# Patient Record
Sex: FEMALE | Race: WHITE | Employment: UNEMPLOYED | URBAN - METROPOLITAN AREA
[De-identification: names, ages, dates, MRNs, and addresses within clinical notes are randomized per-mention and may not be internally consistent; named-entity substitution may affect disease eponyms.]

---

## 2021-02-23 ENCOUNTER — TELEPHONE (OUTPATIENT)
Dept: OBGYN | Age: 51
End: 2021-02-23

## 2021-03-05 ENCOUNTER — HOSPITAL ENCOUNTER (OUTPATIENT)
Age: 51
Discharge: HOME | End: 2021-03-05
Payer: MEDICARE

## 2021-03-05 ENCOUNTER — HOSPITAL ENCOUNTER (OUTPATIENT)
Dept: GENERAL RADIOLOGY | Age: 51
Discharge: HOME | End: 2021-03-07
Payer: MEDICARE

## 2021-03-05 ENCOUNTER — HOSPITAL ENCOUNTER (OUTPATIENT)
Age: 51
Discharge: HOME | End: 2021-03-07
Payer: MEDICARE

## 2021-03-05 DIAGNOSIS — F10.11 ALCOHOL ABUSE, IN REMISSION: ICD-10-CM

## 2021-03-05 LAB
ABSOLUTE EOS #: 0.24 K/UL (ref 0–0.44)
ABSOLUTE IMMATURE GRANULOCYTE: 0.03 K/UL (ref 0–0.3)
ABSOLUTE LYMPH #: 1.95 K/UL (ref 1.1–3.7)
ABSOLUTE MONO #: 0.59 K/UL (ref 0.1–1.2)
ALBUMIN SERPL-MCNC: 4.5 G/DL (ref 3.5–5.2)
ALBUMIN/GLOBULIN RATIO: 1.6 (ref 1–2.5)
ALP BLD-CCNC: 94 U/L (ref 35–104)
ALT SERPL-CCNC: 26 U/L (ref 5–33)
ANION GAP SERPL CALCULATED.3IONS-SCNC: 12 MMOL/L (ref 9–17)
AST SERPL-CCNC: 19 U/L
BASOPHILS # BLD: 1 % (ref 0–2)
BASOPHILS ABSOLUTE: 0.07 K/UL (ref 0–0.2)
BILIRUB SERPL-MCNC: 0.45 MG/DL (ref 0.3–1.2)
BUN BLDV-MCNC: 23 MG/DL (ref 6–20)
BUN/CREAT BLD: ABNORMAL (ref 9–20)
CALCIUM SERPL-MCNC: 10.1 MG/DL (ref 8.6–10.4)
CHLORIDE BLD-SCNC: 101 MMOL/L (ref 98–107)
CHOLESTEROL/HDL RATIO: 2.7
CHOLESTEROL: 152 MG/DL
CO2: 26 MMOL/L (ref 20–31)
CREAT SERPL-MCNC: 1.03 MG/DL (ref 0.5–0.9)
DIFFERENTIAL TYPE: ABNORMAL
EKG ATRIAL RATE: 57 BPM
EKG P AXIS: 68 DEGREES
EKG P-R INTERVAL: 158 MS
EKG Q-T INTERVAL: 446 MS
EKG QRS DURATION: 102 MS
EKG QTC CALCULATION (BAZETT): 434 MS
EKG R AXIS: 67 DEGREES
EKG T AXIS: 44 DEGREES
EKG VENTRICULAR RATE: 57 BPM
EOSINOPHILS RELATIVE PERCENT: 3 % (ref 1–4)
GFR AFRICAN AMERICAN: >60 ML/MIN
GFR NON-AFRICAN AMERICAN: 56 ML/MIN
GFR SERPL CREATININE-BSD FRML MDRD: ABNORMAL ML/MIN/{1.73_M2}
GFR SERPL CREATININE-BSD FRML MDRD: ABNORMAL ML/MIN/{1.73_M2}
GLUCOSE BLD-MCNC: 133 MG/DL (ref 70–99)
HAV IGM SER IA-ACNC: NONREACTIVE
HCT VFR BLD CALC: 38.7 % (ref 36.3–47.1)
HDLC SERPL-MCNC: 56 MG/DL
HEMOGLOBIN: 12.6 G/DL (ref 11.9–15.1)
HEPATITIS B CORE IGM ANTIBODY: NONREACTIVE
HEPATITIS B SURFACE ANTIGEN: NONREACTIVE
HEPATITIS C ANTIBODY: REACTIVE
HIV AG/AB: NONREACTIVE
IMMATURE GRANULOCYTES: 0 %
LDL CHOLESTEROL: 75 MG/DL (ref 0–130)
LYMPHOCYTES # BLD: 23 % (ref 24–43)
MCH RBC QN AUTO: 33.6 PG (ref 25.2–33.5)
MCHC RBC AUTO-ENTMCNC: 32.6 G/DL (ref 28.4–34.8)
MCV RBC AUTO: 103.2 FL (ref 82.6–102.9)
MONOCYTES # BLD: 7 % (ref 3–12)
NRBC AUTOMATED: 0 PER 100 WBC
PDW BLD-RTO: 12.2 % (ref 11.8–14.4)
PLATELET # BLD: 305 K/UL (ref 138–453)
PLATELET ESTIMATE: ABNORMAL
PMV BLD AUTO: 9.4 FL (ref 8.1–13.5)
POTASSIUM SERPL-SCNC: 3.7 MMOL/L (ref 3.7–5.3)
RBC # BLD: 3.75 M/UL (ref 3.95–5.11)
RBC # BLD: ABNORMAL 10*6/UL
SEG NEUTROPHILS: 66 % (ref 36–65)
SEGMENTED NEUTROPHILS ABSOLUTE COUNT: 5.7 K/UL (ref 1.5–8.1)
SODIUM BLD-SCNC: 139 MMOL/L (ref 135–144)
SOURCE: NORMAL
T. PALLIDUM, IGG: NONREACTIVE
TOTAL PROTEIN: 7.4 G/DL (ref 6.4–8.3)
TRICHOMONAS VAGINALI, MOLECULAR: NEGATIVE
TRIGL SERPL-MCNC: 106 MG/DL
TSH SERPL DL<=0.05 MIU/L-ACNC: 9.61 MIU/L (ref 0.3–5)
VITAMIN D 25-HYDROXY: 68.1 NG/ML (ref 30–100)
VLDLC SERPL CALC-MCNC: NORMAL MG/DL (ref 1–30)
WBC # BLD: 8.6 K/UL (ref 3.5–11.3)
WBC # BLD: ABNORMAL 10*3/UL

## 2021-03-05 PROCEDURE — 80053 COMPREHEN METABOLIC PANEL: CPT

## 2021-03-05 PROCEDURE — 86780 TREPONEMA PALLIDUM: CPT

## 2021-03-05 PROCEDURE — 87389 HIV-1 AG W/HIV-1&-2 AB AG IA: CPT

## 2021-03-05 PROCEDURE — 71045 X-RAY EXAM CHEST 1 VIEW: CPT

## 2021-03-05 PROCEDURE — 73130 X-RAY EXAM OF HAND: CPT

## 2021-03-05 PROCEDURE — 80061 LIPID PANEL: CPT

## 2021-03-05 PROCEDURE — 87491 CHLMYD TRACH DNA AMP PROBE: CPT

## 2021-03-05 PROCEDURE — 85025 COMPLETE CBC W/AUTO DIFF WBC: CPT

## 2021-03-05 PROCEDURE — 93005 ELECTROCARDIOGRAM TRACING: CPT

## 2021-03-05 PROCEDURE — 93010 ELECTROCARDIOGRAM REPORT: CPT | Performed by: INTERNAL MEDICINE

## 2021-03-05 PROCEDURE — 87591 N.GONORRHOEAE DNA AMP PROB: CPT

## 2021-03-05 PROCEDURE — 87661 TRICHOMONAS VAGINALIS AMPLIF: CPT

## 2021-03-05 PROCEDURE — 84443 ASSAY THYROID STIM HORMONE: CPT

## 2021-03-05 PROCEDURE — 82306 VITAMIN D 25 HYDROXY: CPT

## 2021-03-05 PROCEDURE — 80074 ACUTE HEPATITIS PANEL: CPT

## 2021-03-05 PROCEDURE — 36415 COLL VENOUS BLD VENIPUNCTURE: CPT

## 2021-03-08 LAB
C. TRACHOMATIS DNA ,URINE: NEGATIVE
N. GONORRHOEAE DNA, URINE: NEGATIVE
SPECIMEN DESCRIPTION: NORMAL

## 2021-03-17 ENCOUNTER — HOSPITAL ENCOUNTER (OUTPATIENT)
Age: 51
Discharge: HOME | End: 2021-03-17
Payer: MEDICARE

## 2021-03-17 LAB
ABSOLUTE EOS #: 0.23 K/UL (ref 0–0.44)
ABSOLUTE IMMATURE GRANULOCYTE: 0.03 K/UL (ref 0–0.3)
ABSOLUTE LYMPH #: 1.45 K/UL (ref 1.1–3.7)
ABSOLUTE MONO #: 0.78 K/UL (ref 0.1–1.2)
ANION GAP SERPL CALCULATED.3IONS-SCNC: 6 MMOL/L (ref 9–17)
BASOPHILS # BLD: 1 % (ref 0–2)
BASOPHILS ABSOLUTE: 0.05 K/UL (ref 0–0.2)
BUN BLDV-MCNC: 9 MG/DL (ref 6–20)
BUN/CREAT BLD: ABNORMAL (ref 9–20)
CALCIUM SERPL-MCNC: 9.8 MG/DL (ref 8.6–10.4)
CHLORIDE BLD-SCNC: 107 MMOL/L (ref 98–107)
CO2: 27 MMOL/L (ref 20–31)
CREAT SERPL-MCNC: 0.95 MG/DL (ref 0.5–0.9)
DIFFERENTIAL TYPE: ABNORMAL
EOSINOPHILS RELATIVE PERCENT: 2 % (ref 1–4)
GFR AFRICAN AMERICAN: >60 ML/MIN
GFR NON-AFRICAN AMERICAN: >60 ML/MIN
GFR SERPL CREATININE-BSD FRML MDRD: ABNORMAL ML/MIN/{1.73_M2}
GFR SERPL CREATININE-BSD FRML MDRD: ABNORMAL ML/MIN/{1.73_M2}
GLUCOSE BLD-MCNC: 115 MG/DL (ref 70–99)
HCT VFR BLD CALC: 36.7 % (ref 36.3–47.1)
HEMOGLOBIN: 12.2 G/DL (ref 11.9–15.1)
IMMATURE GRANULOCYTES: 0 %
LYMPHOCYTES # BLD: 15 % (ref 24–43)
MCH RBC QN AUTO: 33.9 PG (ref 25.2–33.5)
MCHC RBC AUTO-ENTMCNC: 33.2 G/DL (ref 28.4–34.8)
MCV RBC AUTO: 101.9 FL (ref 82.6–102.9)
MONOCYTES # BLD: 8 % (ref 3–12)
NRBC AUTOMATED: 0 PER 100 WBC
PDW BLD-RTO: 12.3 % (ref 11.8–14.4)
PLATELET # BLD: 291 K/UL (ref 138–453)
PLATELET ESTIMATE: ABNORMAL
PMV BLD AUTO: 9.7 FL (ref 8.1–13.5)
POTASSIUM SERPL-SCNC: 3.7 MMOL/L (ref 3.7–5.3)
RBC # BLD: 3.6 M/UL (ref 3.95–5.11)
RBC # BLD: ABNORMAL 10*6/UL
SEG NEUTROPHILS: 74 % (ref 36–65)
SEGMENTED NEUTROPHILS ABSOLUTE COUNT: 7.05 K/UL (ref 1.5–8.1)
SODIUM BLD-SCNC: 140 MMOL/L (ref 135–144)
WBC # BLD: 9.6 K/UL (ref 3.5–11.3)
WBC # BLD: ABNORMAL 10*3/UL

## 2021-03-17 PROCEDURE — 87522 HEPATITIS C REVRS TRNSCRPJ: CPT

## 2021-03-17 PROCEDURE — 85025 COMPLETE CBC W/AUTO DIFF WBC: CPT

## 2021-03-17 PROCEDURE — 87902 NFCT AGT GNTYP ALYS HEP C: CPT

## 2021-03-17 PROCEDURE — 80048 BASIC METABOLIC PNL TOTAL CA: CPT

## 2021-03-17 PROCEDURE — 36415 COLL VENOUS BLD VENIPUNCTURE: CPT

## 2021-03-19 ENCOUNTER — TELEPHONE (OUTPATIENT)
Dept: OBGYN | Age: 51
End: 2021-03-19

## 2021-03-19 LAB
DIRECT EXAM: NORMAL
Lab: NORMAL
SPECIMEN DESCRIPTION: NORMAL

## 2021-03-19 NOTE — TELEPHONE ENCOUNTER
Patient no showed new patient appointment. Left a voicemail for patient to call back. Letter will be sent home.

## 2021-03-20 LAB
HCV QUANTITATIVE: NORMAL
HEPATITIS C GENOTYPE: NORMAL

## 2022-03-01 ENCOUNTER — APPOINTMENT (OUTPATIENT)
Dept: GENERAL RADIOLOGY | Age: 52
End: 2022-03-01
Payer: MEDICARE

## 2022-03-01 ENCOUNTER — APPOINTMENT (OUTPATIENT)
Dept: CT IMAGING | Age: 52
End: 2022-03-01
Payer: MEDICARE

## 2022-03-01 ENCOUNTER — HOSPITAL ENCOUNTER (EMERGENCY)
Age: 52
Discharge: HOME | End: 2022-03-02
Attending: EMERGENCY MEDICINE
Payer: MEDICARE

## 2022-03-01 DIAGNOSIS — S02.401A CLOSED FRACTURE OF MAXILLARY SINUS, INITIAL ENCOUNTER (HCC): Primary | ICD-10-CM
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
## 2022-03-01 LAB
ABSOLUTE EOS #: 0.23 K/UL (ref 0–0.44)
ABSOLUTE IMMATURE GRANULOCYTE: 0.04 K/UL (ref 0–0.3)
ABSOLUTE LYMPH #: 2.48 K/UL (ref 1.1–3.7)
ABSOLUTE MONO #: 0.92 K/UL (ref 0.1–1.2)
ACETAMINOPHEN LEVEL: <5 UG/ML (ref 10–30)
ANION GAP SERPL CALCULATED.3IONS-SCNC: 16 MMOL/L (ref 9–17)
BASOPHILS # BLD: 1 % (ref 0–2)
BASOPHILS ABSOLUTE: 0.08 K/UL (ref 0–0.2)
BUN BLDV-MCNC: 5 MG/DL (ref 6–20)
CALCIUM SERPL-MCNC: 9.2 MG/DL (ref 8.6–10.4)
CHLORIDE BLD-SCNC: 106 MMOL/L (ref 98–107)
CO2: 23 MMOL/L (ref 20–31)
CREAT SERPL-MCNC: 0.68 MG/DL (ref 0.5–0.9)
EOSINOPHILS RELATIVE PERCENT: 2 % (ref 1–4)
ETHANOL PERCENT: 0.03 %
ETHANOL: 30 MG/DL
GFR AFRICAN AMERICAN: >60 ML/MIN
GFR NON-AFRICAN AMERICAN: >60 ML/MIN
GFR SERPL CREATININE-BSD FRML MDRD: ABNORMAL ML/MIN/{1.73_M2}
GLUCOSE BLD-MCNC: 121 MG/DL (ref 70–99)
HCT VFR BLD CALC: 36.9 % (ref 36.3–47.1)
HEMOGLOBIN: 12.8 G/DL (ref 11.9–15.1)
IMMATURE GRANULOCYTES: 0 %
LYMPHOCYTES # BLD: 21 % (ref 24–43)
MAGNESIUM: 2 MG/DL (ref 1.6–2.6)
MCH RBC QN AUTO: 34.5 PG (ref 25.2–33.5)
MCHC RBC AUTO-ENTMCNC: 34.7 G/DL (ref 28.4–34.8)
MCV RBC AUTO: 99.5 FL (ref 82.6–102.9)
MONOCYTES # BLD: 8 % (ref 3–12)
NRBC AUTOMATED: 0 PER 100 WBC
PDW BLD-RTO: 13.3 % (ref 11.8–14.4)
PLATELET # BLD: 445 K/UL (ref 138–453)
PMV BLD AUTO: 9.6 FL (ref 8.1–13.5)
POTASSIUM SERPL-SCNC: 3.5 MMOL/L (ref 3.7–5.3)
RBC # BLD: 3.71 M/UL (ref 3.95–5.11)
SALICYLATE LEVEL: <1 MG/DL (ref 3–10)
SEG NEUTROPHILS: 68 % (ref 36–65)
SEGMENTED NEUTROPHILS ABSOLUTE COUNT: 8.01 K/UL (ref 1.5–8.1)
SODIUM BLD-SCNC: 145 MMOL/L (ref 135–144)
TOXIC TRICYCLIC SC,BLOOD: NEGATIVE
TROPONIN, HIGH SENSITIVITY: 9 NG/L (ref 0–14)
WBC # BLD: 11.8 K/UL (ref 3.5–11.3)

## 2022-03-01 PROCEDURE — 83735 ASSAY OF MAGNESIUM: CPT

## 2022-03-01 PROCEDURE — 85025 COMPLETE CBC W/AUTO DIFF WBC: CPT

## 2022-03-01 PROCEDURE — 70486 CT MAXILLOFACIAL W/O DYE: CPT

## 2022-03-01 PROCEDURE — 80048 BASIC METABOLIC PNL TOTAL CA: CPT

## 2022-03-01 PROCEDURE — 80179 DRUG ASSAY SALICYLATE: CPT

## 2022-03-01 PROCEDURE — 84484 ASSAY OF TROPONIN QUANT: CPT

## 2022-03-01 PROCEDURE — 93005 ELECTROCARDIOGRAM TRACING: CPT | Performed by: STUDENT IN AN ORGANIZED HEALTH CARE EDUCATION/TRAINING PROGRAM

## 2022-03-01 PROCEDURE — 70450 CT HEAD/BRAIN W/O DYE: CPT

## 2022-03-01 PROCEDURE — 72125 CT NECK SPINE W/O DYE: CPT

## 2022-03-01 PROCEDURE — 99284 EMERGENCY DEPT VISIT MOD MDM: CPT

## 2022-03-01 PROCEDURE — 80307 DRUG TEST PRSMV CHEM ANLYZR: CPT

## 2022-03-01 PROCEDURE — 71045 X-RAY EXAM CHEST 1 VIEW: CPT

## 2022-03-01 PROCEDURE — G0480 DRUG TEST DEF 1-7 CLASSES: HCPCS

## 2022-03-01 PROCEDURE — 80143 DRUG ASSAY ACETAMINOPHEN: CPT

## 2022-03-02 VITALS
SYSTOLIC BLOOD PRESSURE: 142 MMHG | OXYGEN SATURATION: 96 % | HEART RATE: 94 BPM | RESPIRATION RATE: 18 BRPM | TEMPERATURE: 98.9 F | DIASTOLIC BLOOD PRESSURE: 83 MMHG

## 2022-03-02 LAB
EKG ATRIAL RATE: 103 BPM
EKG P AXIS: 86 DEGREES
EKG P-R INTERVAL: 142 MS
EKG Q-T INTERVAL: 346 MS
EKG QRS DURATION: 80 MS
EKG QTC CALCULATION (BAZETT): 453 MS
EKG R AXIS: 88 DEGREES
EKG T AXIS: 80 DEGREES
EKG VENTRICULAR RATE: 103 BPM

## 2022-03-02 PROCEDURE — 6370000000 HC RX 637 (ALT 250 FOR IP): Performed by: STUDENT IN AN ORGANIZED HEALTH CARE EDUCATION/TRAINING PROGRAM

## 2022-03-02 PROCEDURE — 93010 ELECTROCARDIOGRAM REPORT: CPT | Performed by: INTERNAL MEDICINE

## 2022-03-02 RX ORDER — CLINDAMYCIN HYDROCHLORIDE 150 MG/1
450 CAPSULE ORAL ONCE
Status: COMPLETED | OUTPATIENT
Start: 2022-03-02 | End: 2022-03-02

## 2022-03-02 RX ORDER — CLINDAMYCIN HYDROCHLORIDE 150 MG/1
450 CAPSULE ORAL 3 TIMES DAILY
Qty: 45 CAPSULE | Refills: 0 | Status: SHIPPED | OUTPATIENT
Start: 2022-03-02 | End: 2022-03-07

## 2022-03-02 RX ADMIN — CLINDAMYCIN HYDROCHLORIDE 450 MG: 150 CAPSULE ORAL at 00:41

## 2022-03-02 ASSESSMENT — ENCOUNTER SYMPTOMS
DIARRHEA: 0
COUGH: 0
NAUSEA: 0
FACIAL SWELLING: 1
SHORTNESS OF BREATH: 1
BACK PAIN: 0
ABDOMINAL PAIN: 0
VOMITING: 0
RHINORRHEA: 0
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
## 2022-03-27 ENCOUNTER — HOSPITAL ENCOUNTER (EMERGENCY)
Age: 52
Discharge: HOME | End: 2022-03-28
Attending: EMERGENCY MEDICINE
Payer: MEDICARE

## 2022-03-27 VITALS
OXYGEN SATURATION: 100 % | DIASTOLIC BLOOD PRESSURE: 96 MMHG | TEMPERATURE: 97.7 F | HEART RATE: 78 BPM | RESPIRATION RATE: 18 BRPM | SYSTOLIC BLOOD PRESSURE: 135 MMHG

## 2022-03-27 DIAGNOSIS — T33.832A FROSTBITE OF BOTH GREAT TOES: Primary | ICD-10-CM

## 2022-03-27 DIAGNOSIS — T33.831A FROSTBITE OF BOTH GREAT TOES: Primary | ICD-10-CM

## 2022-03-27 LAB
CHP ED QC CHECK: NORMAL
GLUCOSE BLD-MCNC: 103 MG/DL
GLUCOSE BLD-MCNC: 103 MG/DL (ref 65–105)

## 2022-03-27 PROCEDURE — 6370000000 HC RX 637 (ALT 250 FOR IP): Performed by: STUDENT IN AN ORGANIZED HEALTH CARE EDUCATION/TRAINING PROGRAM

## 2022-03-27 PROCEDURE — 82947 ASSAY GLUCOSE BLOOD QUANT: CPT

## 2022-03-27 PROCEDURE — 99284 EMERGENCY DEPT VISIT MOD MDM: CPT

## 2022-03-27 RX ORDER — IBUPROFEN 800 MG/1
800 TABLET ORAL ONCE
Status: COMPLETED | OUTPATIENT
Start: 2022-03-27 | End: 2022-03-27

## 2022-03-27 RX ORDER — BACITRACIN, NEOMYCIN, POLYMYXIN B 400; 3.5; 5 [USP'U]/G; MG/G; [USP'U]/G
OINTMENT TOPICAL
Qty: 1 EACH | Refills: 0 | Status: SHIPPED | OUTPATIENT
Start: 2022-03-27 | End: 2022-04-06

## 2022-03-27 RX ORDER — IBUPROFEN 600 MG/1
600 TABLET ORAL EVERY 6 HOURS PRN
Qty: 28 TABLET | Refills: 0 | Status: SHIPPED | OUTPATIENT
Start: 2022-03-27 | End: 2022-04-03

## 2022-03-27 RX ORDER — ACETAMINOPHEN 500 MG
1000 TABLET ORAL ONCE
Status: COMPLETED | OUTPATIENT
Start: 2022-03-27 | End: 2022-03-27

## 2022-03-27 RX ADMIN — IBUPROFEN 800 MG: 800 TABLET, FILM COATED ORAL at 23:14

## 2022-03-27 RX ADMIN — ACETAMINOPHEN 1000 MG: 500 TABLET ORAL at 23:14

## 2022-03-27 ASSESSMENT — PAIN SCALES - GENERAL: PAINLEVEL_OUTOF10: 9

## 2022-03-27 ASSESSMENT — ENCOUNTER SYMPTOMS
SHORTNESS OF BREATH: 0
ABDOMINAL PAIN: 0
COUGH: 0

## 2022-03-28 RX ORDER — GABAPENTIN 100 MG/1
200 CAPSULE ORAL ONCE
Status: DISCONTINUED | OUTPATIENT
Start: 2022-03-28 | End: 2022-03-28 | Stop reason: HOSPADM

## 2022-03-28 RX ORDER — GABAPENTIN 100 MG/1
100 CAPSULE ORAL 2 TIMES DAILY PRN
Qty: 12 CAPSULE | Refills: 0 | Status: SHIPPED | OUTPATIENT
Start: 2022-03-28 | End: 2022-03-31

## 2022-03-28 ASSESSMENT — ENCOUNTER SYMPTOMS
VOMITING: 0
COLOR CHANGE: 1
NAUSEA: 0

## 2022-03-28 NOTE — ED PROVIDER NOTES
[facility] ED  Emergency Department Encounter  EmergencyMedicine Resident     Pt Name:Elaine Morales  MRN: [id number]  [place] 1970  Date of evaluation: 3/27/22  PCP:  Ernesto Monsey Anika       Chief Complaint   Patient presents with    Foot Pain    Frostbite       HISTORY OF PRESENT ILLNESS  (Location/Symptom, Timing/Onset, Context/Setting, Quality, Duration, Modifying Factors, Severity.)      María Lazcano is a 45 y.o. female who presents with bilateral foot pain that began this afternoon. Pt states she has been homeless and living out in the cold since March 1st. Today the patient secured a safe place to stay and began experiencing tingling and burning pain 8/10 in severity in all of her toes, radiating up her feet when entering the warm environment. Pt states her toes have been numb with loss of sensation since the beginning of March. She currently endorses numbness, tingling, and loss of sensation in all of her toes up to the mid-foot bilaterally. There is a small area of calloused skin with underlying old blood collection on her R big toe which she noticed 2 days ago. Bilateral DP and PT pulses are 2+. Feet are warm and dry, mobility intact. She denies chest pain, SOB, cough, nausea, vomiting, chills, fever. PAST MEDICAL / SURGICAL / SOCIAL / FAMILY HISTORY      has no past medical history on file. has no past surgical history on file.       Social History     Socioeconomic History    Marital status:      Spouse name: Not on file    Number of children: Not on file    Years of education: Not on file    Highest education level: Not on file   Occupational History    Not on file   Tobacco Use    Smoking status: Not on file    Smokeless tobacco: Not on file   Substance and Sexual Activity    Alcohol use: Not on file    Drug use: Not on file    Sexual activity: Not on file   Other Topics Concern    Not on file   Social History Narrative    Not on file Social Determinants of Health     Financial Resource Strain:     Difficulty of Paying Living Expenses: Not on file   Food Insecurity:     Worried About Running Out of Food in the Last Year: Not on file    Arnol of Food in the Last Year: Not on file   Transportation Needs:     Lack of Transportation (Medical): Not on file    Lack of Transportation (Non-Medical): Not on file   Physical Activity:     Days of Exercise per Week: Not on file    Minutes of Exercise per Session: Not on file   Stress:     Feeling of Stress : Not on file   Social Connections:     Frequency of Communication with Friends and Family: Not on file    Frequency of Social Gatherings with Friends and Family: Not on file    Attends Church Services: Not on file    Active Member of [address] [place] [address] [employer] or Organizations: Not on file    Attends Club or Organization Meetings: Not on file    Marital Status: Not on file   Intimate Partner Violence:     Fear of Current or Ex-Partner: Not on file    Emotionally Abused: Not on file    Physically Abused: Not on file    Sexually Abused: Not on file   Housing Stability:     Unable to Pay for Housing in the Last Year: Not on file    Number of [place] in the Last Year: Not on file    Unstable Housing in the Last Year: Not on file       No family history on file. Allergies:  Pcn [penicillins]    Home Medications:  Prior to Admission medications    Medication Sig Start Date End Date Taking? Authorizing Provider   gabapentin (NEURONTIN) 100 MG capsule Take 1 capsule by mouth 2 times daily as needed (foot pain) for up to 12 doses. Intended supply: 30 days 3/28/22 3/31/22 Yes Daljit Hoover, DO   neomycin-bacitracin-polymyxin (NEOSPORIN) 400-5-5000 ointment Apply topically 2 times daily.  3/27/22 4/6/22 Yes Dajlit Hoover DO   ibuprofen (ADVIL;MOTRIN) 600 MG tablet Take 1 tablet by mouth every 6 hours as needed for Pain 3/27/22 4/3/22 Yes Oli Marquez, DO       REVIEW OF SYSTEMS (2-9 systems for level 4, 10 or more for level 5)      Review of Systems   Constitutional: Negative for chills and fever. Respiratory: Negative for cough and shortness of breath. Cardiovascular: Negative for chest pain. Gastrointestinal: Negative for abdominal pain, nausea and vomiting. Skin: Positive for color change. Negative for rash. Calloused skin with underlying blood collection, R big toe    Neurological: Positive for numbness. Negative for weakness. PHYSICAL EXAM   (up to 7 for level 4, 8 or more for level 5)      INITIAL VITALS:   BP (!) 135/96   Pulse 78   Temp 97.7 °F (36.5 °C) (Oral)   Resp 18   SpO2 100%      Vitals:    03/27/22 2234 03/27/22 2237   BP:  (!) 135/96   Pulse:  78   Resp: 18    Temp: 97.7 °F (36.5 °C)    TempSrc: Oral    SpO2:  100%        Physical Exam  Constitutional:       General: She is not in acute distress. Appearance: She is well-developed. She is not ill-appearing. HENT:      Head: Normocephalic and atraumatic. Eyes:      Extraocular Movements: Extraocular movements intact. Pupils: Pupils are equal, round, and reactive to light. Cardiovascular:      Rate and Rhythm: Normal rate and regular rhythm. Pulmonary:      Effort: Pulmonary effort is normal. No respiratory distress. Abdominal:      General: There is no distension. Palpations: Abdomen is soft. Tenderness: There is no abdominal tenderness. Musculoskeletal:         General: Normal range of motion. Cervical back: Normal range of motion and neck supple. Skin:     General: Skin is warm and dry. Comments: Calloused skin with blood collection underneath to tip of R big toe. See clinical media. Neurological:      General: No focal deficit present. Mental Status: She is alert and oriented to person, place, and time. Comments: Decreased sensation to tips of toes bilaterally.        Media Information             Document Information    Clinical Consent: Wound   Potential frost bite   03/27/2022 23:02   Attached To: Hospital Encounter on 3/27/22     Source [address] [facility]       Media Information             Document Information    Clinical Consent:  Wound      03/27/2022 23:02   Attached To: Hospital Encounter on 3/27/22     Source Information    [facility]       DIFFERENTIAL  DIAGNOSIS     PLAN (LABS / Clearance Jerson / EKG):  Orders Placed This Encounter   Procedures    Inpatient consult to Podiatry    POCT glucose    POC Glucose Fingerstick       MEDICATIONS ORDERED:  Orders Placed This Encounter   Medications    ibuprofen (ADVIL;MOTRIN) tablet 800 mg    acetaminophen (TYLENOL) tablet 1,000 mg    neomycin-bacitracin-polymyxin (NEOSPORIN) 400-5-5000 ointment     Sig: Apply topically 2 times daily. Dispense:  1 each     Refill:  0    ibuprofen (ADVIL;MOTRIN) 600 MG tablet     Sig: Take 1 tablet by mouth every 6 hours as needed for Pain     Dispense:  28 tablet     Refill:  0    DISCONTD: gabapentin (NEURONTIN) capsule 200 mg    gabapentin (NEURONTIN) 100 MG capsule     Sig: Take 1 capsule by mouth 2 times daily as needed (foot pain) for up to 12 doses. Intended supply: 30 days     Dispense:  12 capsule     Refill:  0       DIAGNOSTIC RESULTS / EMERGENCY DEPARTMENT COURSE / MDM   LAB RESULTS:  Results for orders placed or performed during the hospital encounter of 03/27/22   POCT glucose   Result Value Ref Range    Glucose 103 mg/dL    QC OK? ok    POC Glucose Fingerstick   Result Value Ref Range    POC Glucose 103 65 - 105 mg/dL         RADIOLOGY:  No orders to display        [facility]    Patient arrives for concerns of frostbite. She is sitting in bed in no acute distress vitals are within normal limits. Patient was treated with Motrin and Tylenol however did not want to wait for the gabapentin to come from pharmacy.   She does still have sensation to her toes but it is decreased. The discoloration on her great toes could be due to poor foot wear which she states was an issue until getting new shoes today. Frostbite is consideration and will need to be evaluated by podiatry. All the toes however do have good cap refill and has no movement deficits. 2+ pulses bilaterally. Do not suspect any fracture as there is no bony tenderness. Patient was given a prescription of bacitracin to plan the toes and recommended at least daily foot cleansing with soap and water. Recommend keeping the feet warm and dry. Did discuss case with podiatry as noted below. Follow-up to podiatry clinic tomorrow    ED Course as of 03/28/22 1452   Sun Mar 27, 2022   2341 Discussed case with podiatry and reviewed images, okay to discharge and follow-up to podiatry clinic. Available Monday Wednesday Friday. Patient states she does have transport back to [facility]. [CS]   8919 GLUCOSE, FASTING,GF: 103 [CS]      ED Course User Index  [CS] Hudson Jorge DO         PROCEDURES:      CONSULTS:  IP CONSULT TO PODIATRY    CRITICAL CARE:  Please see attending note    FINAL IMPRESSION      1. Frostbite of both great toes          DISPOSITION / PLAN     DISPOSITION Decision To Discharge 03/27/2022 11:34:38 PM      PATIENT REFERRED TO:  [facility]  [address]  [facility] [address] [facility] [address] [phone]          DISCHARGE MEDICATIONS:  Discharge Medication List as of 3/28/2022 12:07 AM      START taking these medications    Details   gabapentin (NEURONTIN) 100 MG capsule Take 1 capsule by mouth 2 times daily as needed (foot pain) for up to 12 doses. Intended supply: 30 days, Disp-12 capsule, R-0Print      neomycin-bacitracin-polymyxin (NEOSPORIN) 400-5-5000 ointment Apply topically 2 times daily. , Disp-1 each, R-0, Print      ibuprofen (ADVIL;MOTRIN) 600 MG tablet Take 1 tablet by mouth every 6 hours as needed for Pain, Disp-28 tablet, R-0Print Susan Harris DO  Emergency Medicine Resident    (Please note that portions of thisnote were completed with a voice recognition program.  Efforts were made to edit the dictations but occasionally words are mis-transcribed.)        Susan Harris DO  Resident  03/28/22 3644

## 2022-03-28 NOTE — ED NOTES
Pt presents to the ED c/o bilat foot pain. Pt states she is homeless and has [place] sleeping [place] bus stop, pt states her feet have frost bite and she cannot feel her toes. Pt states she just obtained a place to live today. Pt A&O x 4, does not appear in acute distress, RR even and unlabored, resting comfortably on stretcher with eyes open and call light in reach. Pt placed on continuous monitor with alarms set, vitals obtained. Medical student at bedside to assess pt.  Initial assessment performed by physician, Deidra Duron will carry out initial orders/tasks and reassess pt.            Madhu Hill LPN  40/80/34 4247

## 2022-03-28 NOTE — ED PROVIDER NOTES
[address] [facility]     Emergency Department     Faculty Attestation    I performed a history and physical examination of the patient and discussed management with the resident. I reviewed the resident´s note and agree with the documented findings and plan of care. Any areas of disagreement are noted on the chart. I was personally present for the key portions of any procedures. I have documented in the chart those procedures where I was not present during the key portions. I have reviewed the emergency nurses triage note. I agree with the chief complaint, past medical history, past surgical history, allergies, medications, social and family history as documented unless otherwise noted below. For Physician Assistant/ Nurse Practitioner cases/documentation I have personally evaluated this patient and have completed at least one if not all key elements of the E/M (history, physical exam, and MDM). Additional findings are as noted. Probable second-degree frostbite tips of both great toes.      Jose Rodriguez MD  03/27/22 0536

## 2022-04-08 ENCOUNTER — HOSPITAL ENCOUNTER (EMERGENCY)
Age: 52
Discharge: PSYCHIATRIC OR SUBSTANCE USE FACILITY | End: 2022-04-09
Attending: EMERGENCY MEDICINE
Payer: MEDICARE

## 2022-04-08 DIAGNOSIS — R45.851 SUICIDAL IDEATION: Primary | ICD-10-CM

## 2022-04-08 LAB
ABSOLUTE EOS #: 0.1 K/UL (ref 0–0.44)
ABSOLUTE IMMATURE GRANULOCYTE: <0.03 K/UL (ref 0–0.3)
ABSOLUTE LYMPH #: 1.83 K/UL (ref 1.1–3.7)
ABSOLUTE MONO #: 0.54 K/UL (ref 0.1–1.2)
ACETAMINOPHEN LEVEL: <5 UG/ML (ref 10–30)
ALBUMIN SERPL-MCNC: 4 G/DL (ref 3.5–5.2)
ALBUMIN/GLOBULIN RATIO: 1.8 (ref 1–2.5)
ALP BLD-CCNC: 114 U/L (ref 35–104)
ALT SERPL-CCNC: 15 U/L (ref 5–33)
AMPHETAMINE SCREEN URINE: NEGATIVE
ANION GAP SERPL CALCULATED.3IONS-SCNC: 11 MMOL/L (ref 9–17)
AST SERPL-CCNC: 16 U/L
BARBITURATE SCREEN URINE: NEGATIVE
BASOPHILS # BLD: 1 % (ref 0–2)
BASOPHILS ABSOLUTE: 0.03 K/UL (ref 0–0.2)
BENZODIAZEPINE SCREEN, URINE: NEGATIVE
BILIRUB SERPL-MCNC: 0.43 MG/DL (ref 0.3–1.2)
BUN BLDV-MCNC: 20 MG/DL (ref 6–20)
CALCIUM SERPL-MCNC: 8.9 MG/DL (ref 8.6–10.4)
CANNABINOID SCREEN URINE: POSITIVE
CHLORIDE BLD-SCNC: 111 MMOL/L (ref 98–107)
CO2: 24 MMOL/L (ref 20–31)
COCAINE METABOLITE, URINE: POSITIVE
CREAT SERPL-MCNC: 1 MG/DL (ref 0.5–0.9)
EOSINOPHILS RELATIVE PERCENT: 2 % (ref 1–4)
ETHANOL PERCENT: <0.01 %
ETHANOL: <10 MG/DL
GFR AFRICAN AMERICAN: >60 ML/MIN
GFR NON-AFRICAN AMERICAN: 58 ML/MIN
GFR SERPL CREATININE-BSD FRML MDRD: ABNORMAL ML/MIN/{1.73_M2}
GLUCOSE BLD-MCNC: 96 MG/DL (ref 70–99)
HCT VFR BLD CALC: 36.5 % (ref 36.3–47.1)
HEMOGLOBIN: 12.4 G/DL (ref 11.9–15.1)
IMMATURE GRANULOCYTES: 0 %
LYMPHOCYTES # BLD: 38 % (ref 24–43)
MCH RBC QN AUTO: 33.9 PG (ref 25.2–33.5)
MCHC RBC AUTO-ENTMCNC: 34 G/DL (ref 28.4–34.8)
MCV RBC AUTO: 99.7 FL (ref 82.6–102.9)
METHADONE SCREEN, URINE: NEGATIVE
MONOCYTES # BLD: 11 % (ref 3–12)
NRBC AUTOMATED: 0 PER 100 WBC
OPIATES, URINE: NEGATIVE
OXYCODONE SCREEN URINE: NEGATIVE
PDW BLD-RTO: 13.2 % (ref 11.8–14.4)
PHENCYCLIDINE, URINE: NEGATIVE
PLATELET # BLD: 262 K/UL (ref 138–453)
PMV BLD AUTO: 9.2 FL (ref 8.1–13.5)
POTASSIUM SERPL-SCNC: 4 MMOL/L (ref 3.7–5.3)
RBC # BLD: 3.66 M/UL (ref 3.95–5.11)
SALICYLATE LEVEL: <1 MG/DL (ref 3–10)
SARS-COV-2, RAPID: NOT DETECTED
SEG NEUTROPHILS: 48 % (ref 36–65)
SEGMENTED NEUTROPHILS ABSOLUTE COUNT: 2.29 K/UL (ref 1.5–8.1)
SODIUM BLD-SCNC: 146 MMOL/L (ref 135–144)
SPECIMEN DESCRIPTION: NORMAL
TEST INFORMATION: ABNORMAL
TOTAL PROTEIN: 6.2 G/DL (ref 6.4–8.3)
TOXIC TRICYCLIC SC,BLOOD: NEGATIVE
WBC # BLD: 4.8 K/UL (ref 3.5–11.3)

## 2022-04-08 PROCEDURE — 87635 SARS-COV-2 COVID-19 AMP PRB: CPT

## 2022-04-08 PROCEDURE — 80053 COMPREHEN METABOLIC PANEL: CPT

## 2022-04-08 PROCEDURE — 80179 DRUG ASSAY SALICYLATE: CPT

## 2022-04-08 PROCEDURE — 80143 DRUG ASSAY ACETAMINOPHEN: CPT

## 2022-04-08 PROCEDURE — 80307 DRUG TEST PRSMV CHEM ANLYZR: CPT

## 2022-04-08 PROCEDURE — G0480 DRUG TEST DEF 1-7 CLASSES: HCPCS

## 2022-04-08 PROCEDURE — 99285 EMERGENCY DEPT VISIT HI MDM: CPT

## 2022-04-08 PROCEDURE — 85025 COMPLETE CBC W/AUTO DIFF WBC: CPT

## 2022-04-08 PROCEDURE — 6370000000 HC RX 637 (ALT 250 FOR IP): Performed by: STUDENT IN AN ORGANIZED HEALTH CARE EDUCATION/TRAINING PROGRAM

## 2022-04-08 RX ORDER — HYDROXYZINE HYDROCHLORIDE 25 MG/1
25 TABLET, FILM COATED ORAL ONCE
Status: COMPLETED | OUTPATIENT
Start: 2022-04-08 | End: 2022-04-08

## 2022-04-08 RX ORDER — LORAZEPAM 0.5 MG/1
1 TABLET ORAL ONCE
Status: COMPLETED | OUTPATIENT
Start: 2022-04-08 | End: 2022-04-08

## 2022-04-08 RX ORDER — LORAZEPAM 2 MG/ML
1 INJECTION INTRAMUSCULAR ONCE
Status: DISCONTINUED | OUTPATIENT
Start: 2022-04-08 | End: 2022-04-08

## 2022-04-08 RX ADMIN — HYDROXYZINE HYDROCHLORIDE 25 MG: 25 TABLET, FILM COATED ORAL at 22:57

## 2022-04-08 RX ADMIN — LORAZEPAM 1 MG: 0.5 TABLET ORAL at 18:34

## 2022-04-08 ASSESSMENT — ENCOUNTER SYMPTOMS
SINUS PAIN: 0
ABDOMINAL PAIN: 0
DIARRHEA: 0
EYE PAIN: 0
SHORTNESS OF BREATH: 0
BACK PAIN: 1
COUGH: 0
NAUSEA: 0
VOMITING: 0
SORE THROAT: 0

## 2022-04-08 NOTE — ED NOTES
[facility] PD at bedside to obtain pt's belongings and secure them in locked cabinets.      Navneet Cabral RN  04/08/22 3924

## 2022-04-08 NOTE — ED NOTES
Labeled blood specimens sent to lab via tube system. [x] Lavender   [] on ice   [x] Blue   [] Green/yellow  [] Green/black [] on ice  [] Pink  [x] Red  [] Yellow  [] Blood Cultures       Labeled urine specimen sent to lab via tube system. [x] Urine Sample   [x]  Clean catch   [] Straight cath   [] Urine voided   []  Indwelling catheter   []  Suprapubic catheter      Labeled COVID swab sent to lab via tube system.     [x] COVID-19 swab      [x] Rapid   [] Non- Rapid/PCR  [] Respiratory Panel with [address] [place] [address] , RN  04/08/22 5576
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
## 2022-04-08 NOTE — ED NOTES
[] Justin    [] [address]    [x]  [facility] ASSESSMENT      Y  N     [x] [] In the past two weeks have you had thoughts of hurting yourself in any way? [x] [] In the past two weeks have you had thoughts that you would be better off dead? [] [x] Have you made a suicide attempt in the past two months? [x] [] Do you have a plan for hurting yourself or suicide? [] [x] Presence of hallucinations/voices related to hurting himself or herself or someone else. SUICIDE/SECURITY WATCH PRECAUTION CHECKLIST     Orders    [x]  Suicide/Security Watch Precautions initiated as checked below:   4/8/22 6:14 PM EDT 04/04    [x] Notified physician:  Penelope Gamez MD  4/8/22 6:14 PM EDT    [x] Orders obtained as appropriate:     [] 1:1 Observer     [] Psych Consult     [] Psych Consult    Name:  Date:  Time:    [x] 1:1 Observer, Notified by:  Jen Baldwin RN    Contact Nurse Supervisor    [x] Remove all personal clothes from room and place in snap/paper gown/pants. Slipper only    [x] Remove all personal belongings from room and secured away from patient. Documentation    [x] Initiate Suicide/Security Watch Precaution Flow Sheet    [x] Initiate individualized Care Plan/Problem    [x] Document why precautions initiated on flow sheet (Initiate Nursing Care Plan/Problem)    [x] 1:1 Observer in place; instructions provided. Suicide precautions require observer be within arms length. [x] Nurse-Observer Communication Hand-off initiated by RN, reviewed with Observer. Subsequently used as Hand Off between Observers. [x] Initiate every 15 minute observations per observer as delegated by the RN.     [x] Initiate RN assessment and documentation    Environmental Scan  Search Criteria and Process: OPTIONAL, see Search Policy    [x] Reason for search: Suicidal    [x] Nursing in presence of second person to search patient    [x] Patient notified of reason for body assessment and belongings search:     Persons present during search:   Results of search and disposition:       Searchers Name: [facility]    These items or items similar should be removed from the room:   [] Chairs   [] Telephone   [] Trash cans and liners   [x] Plastic utensils (order Patient Safety tray)   [] Empty or remove Sharps containers   [x] All personal clothing/belongings removed   [x] All unnecessary lead wires, electrical cords, draw cords, etc.   [] Flowers and plants   [x] Double check for lighters, matches, razors, any glass items etc that can be used as weapons. Person completing Checklist: Ad Garcia RN       GENERAL INFORMATION     Y  N     [x] [] Has the patient been informed that they are on a watch and what that means? [x] [] Can the patient get out of Bed without nursing assistance? [x] [] Can the patient use the restroom without nursing assistance? [x] [] Can the patient walk the halls to [place] their legs? \"   [] [x] Does the patient have metal utensils? [x] [] Have the patient's belongings been placed out of control of the patient? [x] [] Have the patient and his/her belongings been checked for contraband? [x] [] Is the patient under any visitor restrictions? If Yes, explain: Suicidal   [] [x] Is the patient under an alias? [facility] Name:   Authorized visitors (no more than two are to be on the list)   Name/Relationship:   Name/Relationship:    Name of Staff member that you  Received this information from?:    General Description:    [employer] [birth year] female 45 y.o. Admission weight: 120 lb (54.4 kg) Height: 5' 9\" (175.3 cm)  Race: [x]  [] Black  []   []   [] [place] [] Other  Facial Hair:  [] Yes  [x] No  If yes, please describe: Identifying Marks (i.e. Visible tattoos, scars, etc... ):     NURSING CARE PLAN    Nursing Diagnosis: Risk of Self Directed Harm  [x] Actual  [] Potential  Date Started: 04/08/2022  Etiological Factors: (related to)  [x] Expressed or implied suicidal ideation/behavior  [] Depression  [] Suicide attempt      [] Low self-esteem  [] Hallucinations      [] Feeling of Hopelessness  [] Substance abuse or withdrawal    [] Dysfunctional family  [] Major traumatic event, eg., divorce, etc   [] Excessive stress/anxiety    4/8/22    Expected Outcomes    Patient will:   [x] Patient will remain safe for the duration of their stay   [x] Patient's environment will be safe, eg. Free of potential suicide weapons   [x] Verbalize Recovery from suicidal episode and improvement in self-worth   [x] Discuss feeling that precipitated suicide attempt/thoughts/behavior   [x] Will describe available resources for crisis prevention and management   [x] Will verbalize positive coping skills     Nursing Intervention   [x] Assessment and Observations hourly   [x] Suicide Precautions implemented with patient, should be 1:1 observation   [x] Document observation [id number] and RN assessment hourly   [] Consult physician for:    [] Psychiatric consult    [] Pharmacological therapy    [] Other:    [x] Patient search completed by security   [x] Initiated appropriate safety protocols by removing from the patient's environment anything that could be used to inflict self injury, eg. Order safe tray, snap gown, etc   [x] Maintain open, warm, caring, non-judgmental attitude/manner towards patient   [] Discuss advantages and disadvantages of existing coping methods/skills   [x] Assist and educate patient with identifying present strengths and coping skills   [x] Keep patient informed regarding plan of care and provide clear concise explanations. Provide the patient/family education information as well as telephone numbers and other information about crisis centers, hot lines, and counselors.     Discharge Planning:   [] Referral  [] Groups [] Health agencies  [] Other:            Alciia Morales RN  04/08/22 7852

## 2022-04-09 ENCOUNTER — HOSPITAL ENCOUNTER (INPATIENT)
Age: 52
LOS: 6 days | Discharge: HOME | DRG: 753 | End: 2022-04-15
Attending: PSYCHIATRY & NEUROLOGY | Admitting: PSYCHIATRY & NEUROLOGY
Payer: MEDICARE

## 2022-04-09 VITALS
RESPIRATION RATE: 14 BRPM | HEIGHT: 69 IN | TEMPERATURE: 97.8 F | SYSTOLIC BLOOD PRESSURE: 139 MMHG | HEART RATE: 74 BPM | DIASTOLIC BLOOD PRESSURE: 86 MMHG | WEIGHT: 120 LBS | BODY MASS INDEX: 17.77 KG/M2 | OXYGEN SATURATION: 98 %

## 2022-04-09 PROBLEM — F12.10 CANNABIS ABUSE: Status: ACTIVE | Noted: 2022-04-09

## 2022-04-09 PROBLEM — F32.A DEPRESSION WITH SUICIDAL IDEATION: Status: ACTIVE | Noted: 2022-04-09

## 2022-04-09 PROBLEM — F14.10 COCAINE ABUSE (HCC): Status: ACTIVE | Noted: 2022-04-09

## 2022-04-09 PROBLEM — R45.851 DEPRESSION WITH SUICIDAL IDEATION: Status: ACTIVE | Noted: 2022-04-09

## 2022-04-09 PROBLEM — F31.4 BIPOLAR I DISORDER, MOST RECENT EPISODE DEPRESSED, SEVERE WITHOUT PSYCHOTIC FEATURES (HCC): Status: ACTIVE | Noted: 2022-04-09

## 2022-04-09 PROBLEM — F33.2 MAJOR DEPRESSIVE DISORDER, RECURRENT SEVERE WITHOUT PSYCHOTIC FEATURES (HCC): Status: ACTIVE | Noted: 2022-04-09

## 2022-04-09 LAB
LITHIUM DATE LAST DOSE: ABNORMAL
LITHIUM DOSE AMOUNT: ABNORMAL
LITHIUM DOSE TIME: ABNORMAL
LITHIUM LEVEL: <0.1 MMOL/L (ref 0.6–1.2)

## 2022-04-09 PROCEDURE — APPSS60 APP SPLIT SHARED TIME 46-60 MINUTES: Performed by: NURSE PRACTITIONER

## 2022-04-09 PROCEDURE — 90792 PSYCH DIAG EVAL W/MED SRVCS: CPT | Performed by: PSYCHIATRY & NEUROLOGY

## 2022-04-09 PROCEDURE — 1240000000 HC EMOTIONAL WELLNESS R&B

## 2022-04-09 PROCEDURE — 36415 COLL VENOUS BLD VENIPUNCTURE: CPT

## 2022-04-09 PROCEDURE — 6370000000 HC RX 637 (ALT 250 FOR IP): Performed by: PSYCHIATRY & NEUROLOGY

## 2022-04-09 PROCEDURE — 80178 ASSAY OF LITHIUM: CPT

## 2022-04-09 RX ORDER — ACETAMINOPHEN 325 MG/1
650 TABLET ORAL EVERY 4 HOURS PRN
Status: DISCONTINUED | OUTPATIENT
Start: 2022-04-09 | End: 2022-04-15 | Stop reason: HOSPADM

## 2022-04-09 RX ORDER — NICOTINE 21 MG/24HR
1 PATCH, TRANSDERMAL 24 HOURS TRANSDERMAL DAILY
Status: DISCONTINUED | OUTPATIENT
Start: 2022-04-09 | End: 2022-04-10

## 2022-04-09 RX ORDER — IBUPROFEN 400 MG/1
400 TABLET ORAL EVERY 6 HOURS PRN
Status: DISCONTINUED | OUTPATIENT
Start: 2022-04-09 | End: 2022-04-15 | Stop reason: HOSPADM

## 2022-04-09 RX ORDER — MAGNESIUM HYDROXIDE/ALUMINUM HYDROXICE/SIMETHICONE 120; 1200; 1200 MG/30ML; MG/30ML; MG/30ML
30 SUSPENSION ORAL EVERY 6 HOURS PRN
Status: DISCONTINUED | OUTPATIENT
Start: 2022-04-09 | End: 2022-04-15 | Stop reason: HOSPADM

## 2022-04-09 RX ORDER — TRAZODONE HYDROCHLORIDE 50 MG/1
50 TABLET ORAL NIGHTLY PRN
Status: DISCONTINUED | OUTPATIENT
Start: 2022-04-09 | End: 2022-04-09

## 2022-04-09 RX ORDER — GABAPENTIN 100 MG/1
100 CAPSULE ORAL 2 TIMES DAILY PRN
Status: DISCONTINUED | OUTPATIENT
Start: 2022-04-09 | End: 2022-04-11

## 2022-04-09 RX ORDER — HYDROXYZINE 50 MG/1
50 TABLET, FILM COATED ORAL 3 TIMES DAILY PRN
Status: DISCONTINUED | OUTPATIENT
Start: 2022-04-09 | End: 2022-04-15 | Stop reason: HOSPADM

## 2022-04-09 RX ORDER — TRAZODONE HYDROCHLORIDE 50 MG/1
50 TABLET ORAL NIGHTLY PRN
Status: DISCONTINUED | OUTPATIENT
Start: 2022-04-09 | End: 2022-04-15 | Stop reason: HOSPADM

## 2022-04-09 RX ORDER — QUETIAPINE 300 MG/1
300 TABLET, FILM COATED, EXTENDED RELEASE ORAL NIGHTLY
Status: DISCONTINUED | OUTPATIENT
Start: 2022-04-09 | End: 2022-04-11

## 2022-04-09 RX ORDER — POLYETHYLENE GLYCOL 3350 17 G/17G
17 POWDER, FOR SOLUTION ORAL DAILY PRN
Status: DISCONTINUED | OUTPATIENT
Start: 2022-04-09 | End: 2022-04-15 | Stop reason: HOSPADM

## 2022-04-09 RX ADMIN — TRAZODONE HYDROCHLORIDE 50 MG: 50 TABLET ORAL at 20:57

## 2022-04-09 RX ADMIN — GABAPENTIN 100 MG: 100 CAPSULE ORAL at 20:57

## 2022-04-09 RX ADMIN — QUETIAPINE FUMARATE 300 MG: 300 TABLET, EXTENDED RELEASE ORAL at 20:57

## 2022-04-09 RX ADMIN — IBUPROFEN 400 MG: 400 TABLET ORAL at 08:31

## 2022-04-09 RX ADMIN — HYDROXYZINE HYDROCHLORIDE 50 MG: 50 TABLET, FILM COATED ORAL at 04:04

## 2022-04-09 RX ADMIN — ACETAMINOPHEN 650 MG: 325 TABLET ORAL at 04:04

## 2022-04-09 RX ADMIN — IBUPROFEN 400 MG: 400 TABLET ORAL at 16:21

## 2022-04-09 RX ADMIN — GABAPENTIN 100 MG: 100 CAPSULE ORAL at 09:57

## 2022-04-09 RX ADMIN — TRAZODONE HYDROCHLORIDE 50 MG: 50 TABLET ORAL at 04:04

## 2022-04-09 RX ADMIN — HYDROXYZINE HYDROCHLORIDE 50 MG: 50 TABLET, FILM COATED ORAL at 17:31

## 2022-04-09 RX ADMIN — HYDROXYZINE HYDROCHLORIDE 50 MG: 50 TABLET, FILM COATED ORAL at 20:56

## 2022-04-09 ASSESSMENT — SLEEP AND FATIGUE QUESTIONNAIRES
DO YOU HAVE DIFFICULTY SLEEPING: YES
DIFFICULTY FALLING ASLEEP: YES
DIFFICULTY ARISING: NO
DIFFICULTY STAYING ASLEEP: YES
RESTFUL SLEEP: NO
SLEEP PATTERN: DIFFICULTY FALLING ASLEEP;DISTURBED/INTERRUPTED SLEEP;INSOMNIA;RESTLESSNESS
AVERAGE NUMBER OF SLEEP HOURS: 2
DO YOU USE A SLEEP AID: YES

## 2022-04-09 ASSESSMENT — PAIN DESCRIPTION - ONSET: ONSET: ON-GOING

## 2022-04-09 ASSESSMENT — PAIN DESCRIPTION - DESCRIPTORS: DESCRIPTORS: ACHING;DISCOMFORT

## 2022-04-09 ASSESSMENT — PAIN SCALES - GENERAL
PAINLEVEL_OUTOF10: 8
PAINLEVEL_OUTOF10: 0
PAINLEVEL_OUTOF10: 8

## 2022-04-09 ASSESSMENT — PAIN DESCRIPTION - PAIN TYPE
TYPE: ACUTE PAIN
TYPE: ACUTE PAIN
TYPE: CHRONIC PAIN

## 2022-04-09 ASSESSMENT — PAIN DESCRIPTION - LOCATION
LOCATION: BACK

## 2022-04-09 ASSESSMENT — PAIN DESCRIPTION - FREQUENCY: FREQUENCY: INTERMITTENT

## 2022-04-09 ASSESSMENT — LIFESTYLE VARIABLES: HISTORY_ALCOHOL_USE: NO

## 2022-04-09 ASSESSMENT — PAIN DESCRIPTION - ORIENTATION: ORIENTATION: LOWER

## 2022-04-09 ASSESSMENT — PATIENT HEALTH QUESTIONNAIRE - PHQ9: SUM OF ALL RESPONSES TO PHQ QUESTIONS 1-9: 14

## 2022-04-09 NOTE — ED NOTES
Pt resting on cot, NAD noted. Pt provided box lunch. Awaiting transport, sitter at bedside.       Espinoza Carvalho RN  04/08/22 1198
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
## 2022-04-09 NOTE — PROGRESS NOTES
Behavioral Services  Medicare Certification Upon Admission    I certify that this patient's inpatient psychiatric hospital admission is medically necessary for:    [x] (1) Treatment which could reasonably be expected to improve this patient's condition,       [x] (2) Or for diagnostic study;     AND     [x](2) The inpatient psychiatric services are provided while the individual is under the care of a physician and are included in the individualized plan of care.     Estimated length of stay/service 4 to 7 days    Plan for post-hospital care Home with outpatient community mental health follow-up    Electronically signed by Jose Elias Scott MD on 4/9/2022 at 4:25 PM

## 2022-04-09 NOTE — PROGRESS NOTES
Comprehensive Nutrition Assessment    Type and Reason for Visit:  Positive Nutrition Screen    Nutrition Recommendations/Plan: Continue regular diet, start Ensure Enlive with meals    Nutrition Assessment:  Pt admitted to [facility], positive nutrition screen due to wt loss and decreased intake. Current and recorded wts stated. Will start Ensure High Protein to better meet estimated nutrition needs    Malnutrition Assessment:  Malnutrition Status: At risk for malnutrition (Comment)    Context:  Acute Illness     Findings of the 6 clinical characteristics of malnutrition:  Energy Intake:  Mild decrease in energy intake (Comment)  Weight Loss:  Unable to assess     Body Fat Loss:  Unable to assess     Muscle Mass Loss:  Unable to assess    Fluid Accumulation:  No significant fluid accumulation     Strength:  Not Performed    Estimated Daily Nutrient Needs:  Energy (kcal):  5191-4981 based on 30-35kcal/kg; Weight Used for Energy Requirements:  Admission     Protein (g):  81 based on 1.5g/kg; Weight Used for Protein Requirements:  Admission            Nutrition Related Findings:  No edema. Labs and meds reviewed. Wounds:  None       Current Nutrition Therapies:    ADULT DIET;  Regular  ADULT ORAL NUTRITION SUPPLEMENT; Breakfast, Lunch, Dinner; Standard High Calorie/High Protein Oral Supplement    Anthropometric Measures:  · Height: 5' 9\" (175.3 cm)  · Current Body Weight: 119 lb 14.9 oz (54.4 kg)   · Admission Body Weight: 119 lb 14.9 oz (54.4 kg)     · Ideal Body Weight: 145 lbs;   · BMI: 17.7  · BMI Categories: Underweight (BMI less than 18.5)       Nutrition Diagnosis:   · Predicted inadequate energy intake related to inadequate protein-energy intake as evidenced by poor intake prior to admission      Nutrition Interventions:   Food and/or Nutrient Delivery:  Continue Current Diet,Start Oral Nutrition Supplement  Nutrition Education/Counseling:  Education not indicated   Coordination of Nutrition Care: Continue to monitor while inpatient    Goals:  Meal and supplement intakes greater than 50%       Nutrition Monitoring and Evaluation:   Behavioral-Environmental Outcomes:  None Identified   Food/Nutrient Intake Outcomes:  Food and Nutrient Intake,Supplement Intake  Physical Signs/Symptoms Outcomes:  Biochemical Data,Meal Time Behavior,Weight,Skin,Nutrition Focused Physical Findings     Discharge Planning: Too soon to determine     Abdifatah Espana RD, LD  [phone]    Some areas of assessment may be incomplete due to standard COVID-19 Precautions.

## 2022-04-09 NOTE — BH NOTE
Patient Education - Tobacco Cessation      Patient given tobacco quitline number 55095077468 at this time, refusing to call at this time, states \" I just dont want to quit now\"- patient given information as to the dangers of long term tobacco use. Continue to reinforce the importance of tobacco cessation.

## 2022-04-09 NOTE — ED NOTES
No change in pt status, pt resting on cot, eyes closed, NAD noted. Sitter at bedside. Awaiting transport, will cont to monitor.       Ana Payan RN  04/09/22 [id number]

## 2022-04-09 NOTE — H&P
Department of Psychiatry  Attending Physician Psychiatric Assessment     Reason for Admission to Psychiatric Unit:  Concerns about patient's safety in the community    CHIEF COMPLAINT: Depression with suicidal ideation with plan and intent to use a knife to cut self    History obtained from: Patient, electronic medical record          HISTORY OF PRESENT ILLNESS:    Sasha Vargas is a 45 y.o. female who has a past medical history of bipolar disorder, anxiety, PTSD, and substance abuse. Patient presented to the ED via a friend after being found in the garage with multiple lines and threatening to end her life. Patient is connected with [facility] and reports having an upcoming appointment at the end of April. This is patient's first admission to [facility]. Patient was seen for initial evaluation today. Nursing staff report patient has been irritable and demanding on the unit since admission. She was resting in bed on approach. Patient responded to verbal stimuli and continues to present as irritable and was occasionally hostile toward writer during assessment. Patient endorses multiple life stressors causing an increase in symptoms of depression lately. She reports that the most significant event lately was being kicked out of her daughter's home and being told she cannot return. She states she has been living with her daughter since September and now she has nowhere to go. She refused to discuss the events which led to her daughter asking her to leave. Patient is describing her mood as \"really bad\" today. She reports having depression \"my whole life\". Lately she has noticed difficulty falling asleep and staying asleep, anhedonia, low motivation, feelings of worthlessness, hopelessness and helplessness, decreased energy and concentration, poor appetite with a 30 pound weight loss over 2 months, psychomotor slowing, and increasing suicidal ideation. Patient denies any previous suicide attempts.   Patient continues to endorse suicidal ideation today and states \"I cannot think straight\". Patient is also endorsing a history of anxiety with panic attacks. She describes current levels of anxiety as excessive and states that she is now worrying about where she will live, feels restless and on edge, is fatigued with muscle tension, poor concentration and nervousness. She reports having her last panic attack 2.5 weeks ago and identifies symptoms of trembling, sweating, racing heart, chest pressure, shortness of breath, and detachment from self/reality. Patient became more irritable as assessment continued. She began to answer many questions with \"I do not know\" or \"I am not answering any more of your questions\". She did confirm history of past diagnosis of bipolar disorder but refused to go into detail regarding symptomology or time of most recent event. Patient denies OCD or phobias. She reports she has never experienced psychosis. She does endorse a history of PTSD and states \"I have had trauma my whole life\" but refuses to discuss this further. Patient reports she has taken lithium and Seroquel for several years. Patient initially denied any recent drug or alcohol abuse, however patient UDS was positive for cocaine and marijuana. She then shrugged her shoulders and stated \"I do not know\". Patient refused to cooperate further with assessment. Patient is unable to contract for safety in the community and warrants hospitalization for stabilization, medication management, and therapeutic groups only.          History of head trauma: [] Yes [x] No    History of seizures: [] Yes [x] No    History of violence or aggression: [x] Yes [] No         PSYCHIATRIC HISTORY:  [x] Yes [] No    Currently follows with [facility]  Denies lifetime suicide attempt(s)  Multiple psychiatric hospital admission(s): [facility], [facility], [facility], [address] medication compliant [x] Yes [] No    Past psychiatric medications includes:   Lithium, Seroquel    Adverse reactions from psychotropic medications: [] Yes [x] No         Lifetime Psychiatric Review of Systems          Depression: Endorses     Anxiety: Endorses     Panic Attacks: Endorses recent     Jacklyn or Hypomania: Endorses past     Phobias: Denies     Obsessions and Compulsions: Denies     Body or Vocal Tics: Denies     Visual Hallucinations: Denies     Auditory Hallucinations: Denies     Delusions/Paranoia: Denies     PTSD: Endorses    Past Medical History:        Diagnosis Date    Psychiatric problem        Past Surgical History:    History reviewed. No pertinent surgical history. Allergies:  Pcn [penicillins]         Social History:    [place] you [place] know\"  Family: Patient refused to respond  Highest Level of Education: 11th grade  Occupation: Unemployed  Marital Status:  2x  Children: 4 daughters  Residence: Reports homeless  Stressors: Chronic mental illness; substance abuse; dysfunctional family dynamics  Patient Assets/Supportive Factors: Willingness to seek treatment; linked with [facility]         DRUG USE HISTORY  Social History     Tobacco Use   Smoking Status Current Every Day Smoker    Packs/day: 1.00    Types: Cigarettes   Smokeless Tobacco Never Used     Social History     Substance and Sexual Activity   Alcohol Use Not Currently     Social History     Substance and Sexual Activity   Drug Use Not Currently     4/8/2022 UDS positive cocaine, cannabis; EtOH negative    Patient refuses to provide details regarding substance use         LEGAL HISTORY:   HISTORY OF INCARCERATION: [x] Yes [] No  Patient refuses to provide details    Family History:   History reviewed. No pertinent family history. Psychiatric Family History  Patient denies psychiatric family history.    \"I do not know I was adopted\"    Suicides in family: [] Yes [x] No    Substance use in family: [] Yes [x] No         PHYSICAL EXAM:  Vitals:  BP (!) 163/107   Pulse 82   Temp 97.9 °F (36.6 °C) (Oral)   Resp 14   Ht 5' 9\" (1.753 m)   Wt 120 lb (54.4 kg)   SpO2 99%   BMI 17.72 kg/m²     LABS:  Labs reviewed: [x] Yes  Last EKG in EMR reviewed: [x] Yes          Review of Systems   Constitutional: Negative for chills and weight loss. HENT: Negative for ear pain and nosebleeds. Eyes: Negative for blurred vision and photophobia. Respiratory: Negative for cough, shortness of breath and wheezing. Cardiovascular: Negative for chest pain and palpitations. Gastrointestinal: Negative for abdominal pain, diarrhea and vomiting. Genitourinary: Negative for dysuria and urgency. Musculoskeletal: Negative for falls and joint pain. Skin: Negative for itching and rash. Neurological: Negative for tremors, seizures and weakness. Endo/Heme/Allergies: Does not bruise/bleed easily. Pain Scale (0 -10): 0    Physical Exam:   Constitutional:  Appears well-developed and well-nourished, no acute distress. HENT:   Head: Normocephalic and atraumatic. Eyes: Conjunctivae are normal. Right eye exhibits no discharge. Left eye exhibits no discharge. No scleral icterus. Neck: Normal range of motion. Neck supple. Pulmonary/Chest:  No respiratory distress or accessory muscle use, no wheezing. Cardiac: Regular rate and rhythm. Abdominal: Soft. Non-tender. Exhibits no distension. Musculoskeletal: Normal range of motion. Exhibits no edema. Neurological: cranial nerves II-XII grossly in tact, normal gait and station. Skin: Skin is warm and dry. Patient is not diaphoretic. No erythema. Mental Status Examination:    Level of consciousness: Awake and alert  Appearance:  Appropriate attire, resting in bed, fair grooming   Behavior/Motor:  Guarded, restless  Attitude toward examiner:   Irritable, hostile, minimally cooperative, attentive, poor eye contact  Speech: Increased rate, increased volume, and angry tone.   Mood: Irritable  Affect:  Mood congruent  Thought processes:  Goal directed, linear  Thought content: Endorses suicidal ideation; unable to contract for safety in community              Denies homicidal ideations               Denies hallucinations              Denies delusions              Denies paranoia  Cognition:  Oriented to self, location, time, situation  Concentration: Clinically adequate  Memory: Intact  Insight & Judgment: Poor         DSM-5 Diagnosis    Principal Problem: Bipolar I disorder, most recent episode depressed, severe without psychotic features ([facility] [address]  Cocaine abuse  Cannabis abuse    Rule out substance-induced mood disorder    Psychosocial and Contextual factors:  Financial X  Occupational X  Relationship X  Legal   Living situation X  Educational X    Past Medical History:   Diagnosis Date    Psychiatric problem         HANDOFF  Continue inpatient psychiatric treatment. Home medications reviewed/verified: Restart Seroquel, hold lithium until after levels drawn  Order lithium level   Problem list updated. Medications as determined by attending physician  Encourage participation in groups and milieu. Probable discharge is to be determined by MD  Follow-up daily while inpatient. CONSULTS [x] Yes [] No  Internal medicine for medical H&P    Risk Management: close watch per standard protocol    Psychotherapy: participation in milieu and group and individual sessions with Attending Physician,  and Physician Assistant/CNP    Estimated length of stay:  2-14 days    GENERAL PATIENT/FAMILY EDUCATION  Patient will understand basic signs and symptoms, patient will understand benefits/risks and potential side effects from proposed medications, and patient will understand their role in recovery. Family is not active in patient's care.    Patient assets that may be helpful during treatment include: Intent to participate and engage in treatment, sufficient fund of knowledge and intellect to understand and utilize treatments. Goals:    1) Remission of suicidal ideation. 2) Stabilization of symptoms prior to discharge. 3) Establish efficacy and tolerability of medications. Behavioral Services  Medicare Certification     Admission Day 1  I certify that this patient's inpatient psychiatric hospital admission is medically necessary for:    x (1) treatment which could reasonably be expected to improve this patient's condition, or    x (2) diagnostic study or its equivalent. Time Spent: 60 minutes    Migdalia Mora is a 45 y.o. female being evaluated face to face    --HENRIETTA Mota CNP on 4/9/2022 at 12:08 PM    An electronic signature was used to authenticate this note. I independently saw and evaluated the patient. I reviewed the nurse practitioners documentation above. Any additional comments or changes to the nurse practitioners documentation are stated below otherwise agree with assessment. Plan will be as follows:  Patient with history of bipolar disorder, presents as irritable, suicidal, unable to contract for safety outside of the hospital.  Reports being depressed and hopeless. States she had been taking lithium and Seroquel. She is not able to recall last time she took meds. She is denying overdose but we do not have a lithium level. She feels lithium and Seroquel have helped her in the past.  Discussed starting with Seroquel and readding lithium once we verify lithium level to make sure no potential for overdose. Patient also has substance use. States she is interested in substance use rehab from cocaine.     Electronically signed by Griffin Corey MD on 4/9/2022 at 4:27 PM

## 2022-04-09 NOTE — ED NOTES
BHI room assigned Room 208 Bed 2. Paperwork faxed to [facility] ETA 0130.         Steven Lisa, MSW Intern     EMILY Kent  04/08/22 [id number]

## 2022-04-09 NOTE — PLAN OF CARE
Nutrition Problem #1: Predicted inadequate energy intake  Intervention: Food and/or Nutrient Delivery: Continue Current Diet,Start Oral Nutrition Supplement  Nutritional Goals: Meal and supplement intakes greater than 50%

## 2022-04-09 NOTE — BH NOTE
[facility]  Admission Note     Admission Type:   Admission Type: Voluntary    Reason for admission:  Reason for Admission: Pt having suicidal ideation, found in garage with knives. Poor relationship with daughter.     PATIENT STRENGTHS:  Strengths: Communication,Connection to output provider,Motivated,No significant Physical Illness    Patient Strengths and Limitations:  Limitations: General negative or hopeless attitude about future/recovery,Inappropriate/potentially harmful leisure interests,Difficulty problem solving/relies on others to help solve problems,Difficult relationships / poor social skills    Addictive Behavior:   Addictive Behavior  In the past 3 months, have you felt or has someone told you that you have a problem with:  : None  Do you have a history of Chemical Use?: No  Do you have a history of Alcohol Use?: No  Do you have a history of Street Drug Abuse?: Yes  Histroy of Prescripton Drug Abuse?: No    Medical Problems:   Past Medical History:   Diagnosis Date    Psychiatric problem        Status EXAM:  Status and Exam  Normal: No  Facial Expression: Flat,Sad (Irritable)  Affect: Unstable  Level of Consciousness: Alert  Mood:Normal: No  Mood: Depressed,Anxious,Angry  Motor Activity:Normal: Yes  Preception: [place] to Person,[place] to Place,[place] to [place] to Time  Attention:Normal: No  Attention: Distractible  Thought Processes: Blocking  Thought Content:Normal: No  Thought Content: Preoccupations,Poverty of Content  Hallucinations: None  Delusions: No  Memory:Normal: No  Memory: Poor Recent  Insight and Judgment: No  Insight and Judgment: Poor Judgment,Poor Insight  Present Suicidal Ideation: Yes (contracts for safety)  Present Homicidal Ideation: No    Tobacco Screening:  Practical Counseling, on admission, sukhdeep X, if applicable and completed (first 3 are required if patient doesn't refuse):            ( )  Recognizing danger situations (included triggers and roadblocks)                    ( )  Coping skills (new ways to manage stress, exercise, relaxation techniques, changing routine, distraction)                                                           ( )  Basic information about quitting (benefits of quitting, techniques in how to quit, available resources  ( ) Referral for counseling faxed to [facility]                                           (x ) Patient refused counseling  ( ) Patient has not smoked in the last 30 days    Metabolic Screening:    Lab Results   Component Value Date    LABA1C 4.9 09/02/2014       Lab Results   Component Value Date    CHOL 152 03/05/2021    CHOL 168 09/02/2014     Lab Results   Component Value Date    TRIG 106 03/05/2021    TRIG 264 (H) 09/02/2014     Lab Results   Component Value Date    HDL 56 03/05/2021    HDL 43 09/02/2014     No components found for: LDLCAL  No results found for: LABVLDL      Body mass index is 17.72 kg/m². BP Readings from Last 2 Encounters:   04/09/22 (!) 162/101   04/09/22 139/86           Pt admitted with followings belongings:  Dental Appliances: None  Vision - Corrective Lenses: Glasses  Hearing Aid: None  Jewelry: Ring,Bracelet,Necklace (1 gold ring, 1 silver ring, 2 bracelets, 1 silver necklace)  Body Piercings Removed: N/A  Clothing: Ash Ash / coat,Pants,Shirt,Sweater,Socks,Undergarments (Comment)  Were All Patient Medications Collected?: Yes  Other Valuables: Cell phone,Money (Comment),Keys     Pt's belongings were inventoried, valuables placed in safe in security envelope #: \[id number]\. Patient oriented to surroundings and program expectations and copy of patient rights given. Received admission packet:  Yes. Consents reviewed, signed Yes. Patient verbalize understanding:  Yes. Patient education on precautions: Yes. Pt admitted to [facility] Unit Room 208-2 per provider order. Pt changed into hospital attire, nourishment provided. Pt scanned with metal detector. Pt brought to ED after a friend found her in garage with knives having suicidal ideation. Pt transferred to [facility] voluntarily. Pt continues to report suicidal ideation, but contracts for safety. Pt states she feels safe on the unit. Pt also reports high level of depression and anxiety, poor sleep, poor appetite. Pt's medication needs to be verified. Pt is linked with [facility]. Provider paged for orders. Will monitor pt for safety and behavior.                        Olya Meyer RN

## 2022-04-09 NOTE — ED NOTES
Pt resting on cot, sitter at bedside. Pt in NAD, rr even and unlabored. Will cont to monitor. Awaiting transport around 0130, 4/8/22.      Aleida Garcia RN  04/08/22 [id number]

## 2022-04-09 NOTE — ED NOTES
Patient accepted at [facility] by Dr. Nba Linton with diagnosis of Depression with Suicidal Ideation. SW faxed [facility] patients voluntary form and will await bed assignment from [facility]. ANJUM will contact [email or web address] for patient transportation.           EMILY Rangel Intern     EMILY Cervantes  04/08/22 [id number]

## 2022-04-09 NOTE — PLAN OF CARE
Problem: Altered Mood, Depressive Behavior:  Goal: Ability to disclose and discuss suicidal ideas will improve  Description: Ability to disclose and discuss suicidal ideas will improve  4/9/2022 1422 by Omar Posadas RN  Outcome: Ongoing  Patient is alert, observed in room. Patient is lethargic and flat on approach. Patient is currently admitting to having suicidal thoughts, denies having a plan, feels safe on unit. Patient agrees to talk with staff when suicidal thoughts arises/worsens. Patient denies thoughts of wanting to harm others. Patient reports not having any tactile, gustatory, auditory, olfactory, or visual hallucinations. Patient reports having increase anxiety and depression relating to being homeless due to daughter kicking her out of the house. Reassurance and support given. Coping skills explored and discussed. Patient has been isolative to room, oversleeping, only coming out for meals and needs. Patient reports adequate appetite. Patient hygiene is appropriate, took shower today. Patient encouraged to attend unit programming and socialize with peers. No further concerns voiced. Will continue to monitor.

## 2022-04-09 NOTE — PLAN OF CARE
[facility]  Initial Interdisciplinary Treatment Plan NO      Original treatment plan Date & Time: 04/09/2022 0941    Admission Type:  Admission Type: Voluntary    Reason for admission:   Reason for Admission: Pt having suicidal ideation, found in garage with knives. Poor relationship with daughter.     Estimated Length of Stay:  5-7days  Estimated Discharge Date: to be determined by physician    PATIENT STRENGTHS:  Patient Strengths:Strengths: Communication,Connection to output provider,Motivated,No significant Physical Illness  Patient Strengths and Limitations:Limitations: General negative or hopeless attitude about future/recovery,Inappropriate/potentially harmful leisure interests,Difficulty problem solving/relies on others to help solve problems,Difficult relationships / poor social skills  Addictive Behavior: Addictive Behavior  In the past 3 months, have you felt or has someone told you that you have a problem with:  : None  Do you have a history of Chemical Use?: No  Do you have a history of Alcohol Use?: No  Do you have a history of Street Drug Abuse?: Yes  Histroy of Prescripton Drug Abuse?: No  Medical Problems:  Past Medical History:   Diagnosis Date    Psychiatric problem      Status EXAM:Status and Exam  Normal: No  Facial Expression: Flat,Sad (Irritable)  Affect: Unstable  Level of Consciousness: Alert  Mood:Normal: No  Mood: Depressed,Anxious,Angry  Motor Activity:Normal: Yes  Preception: [place] to Person,[place] to Place,[place] to [place] to Time  Attention:Normal: No  Attention: Distractible  Thought Processes: Blocking  Thought Content:Normal: No  Thought Content: Preoccupations,Poverty of Content  Hallucinations: None  Delusions: No  Memory:Normal: No  Memory: Poor Recent  Insight and Judgment: No  Insight and Judgment: Poor Judgment,Poor Insight  Present Suicidal Ideation: Yes (contracts for safety)  Present Homicidal Ideation: No    EDUCATION:   Learner Progress Toward Treatment Goals: reviewed group plans and strategies for care    Method:group therapy, medication compliance, individualized assessments and care planning    Outcome: needs reinforcement    PATIENT GOALS: to be discussed with patient within 72 hours    PLAN/TREATMENT RECOMMENDATIONS:     continue group therapy , medications compliance, goal setting, individualized assessments and care, continue to monitor pt on unit      SHORT-TERM GOALS:   Time frame for Short-Term Goals: 5-7 days    LONG-TERM GOALS:  Time frame for Long-Term Goals: 6 months  Members Present in Team Meeting: See Signature Sheet    Fela Feldman

## 2022-04-09 NOTE — ED NOTES
Pt resting on cot, NAD noted. Pt medicated per orders. Sitter at bedside, will cont to monitor.      Merly Stauffer RN  04/08/22 [id number]

## 2022-04-10 PROBLEM — E43 SEVERE PROTEIN-CALORIE MALNUTRITION (HCC): Status: ACTIVE | Noted: 2022-04-10

## 2022-04-10 PROCEDURE — 6370000000 HC RX 637 (ALT 250 FOR IP): Performed by: PSYCHIATRY & NEUROLOGY

## 2022-04-10 PROCEDURE — 99231 SBSQ HOSP IP/OBS SF/LOW 25: CPT | Performed by: NURSE PRACTITIONER

## 2022-04-10 PROCEDURE — 99223 1ST HOSP IP/OBS HIGH 75: CPT | Performed by: INTERNAL MEDICINE

## 2022-04-10 PROCEDURE — 6370000000 HC RX 637 (ALT 250 FOR IP): Performed by: INTERNAL MEDICINE

## 2022-04-10 PROCEDURE — 99254 IP/OBS CNSLTJ NEW/EST MOD 60: CPT | Performed by: INTERNAL MEDICINE

## 2022-04-10 PROCEDURE — 1240000000 HC EMOTIONAL WELLNESS R&B

## 2022-04-10 RX ORDER — AMLODIPINE BESYLATE 5 MG/1
5 TABLET ORAL DAILY
Status: DISCONTINUED | OUTPATIENT
Start: 2022-04-10 | End: 2022-04-15 | Stop reason: HOSPADM

## 2022-04-10 RX ADMIN — GABAPENTIN 100 MG: 100 CAPSULE ORAL at 07:41

## 2022-04-10 RX ADMIN — QUETIAPINE FUMARATE 300 MG: 300 TABLET, EXTENDED RELEASE ORAL at 20:35

## 2022-04-10 RX ADMIN — GABAPENTIN 100 MG: 100 CAPSULE ORAL at 20:35

## 2022-04-10 RX ADMIN — IBUPROFEN 400 MG: 400 TABLET ORAL at 07:41

## 2022-04-10 RX ADMIN — HYDROXYZINE HYDROCHLORIDE 50 MG: 50 TABLET, FILM COATED ORAL at 15:26

## 2022-04-10 RX ADMIN — AMLODIPINE BESYLATE 5 MG: 5 TABLET ORAL at 12:17

## 2022-04-10 RX ADMIN — TRAZODONE HYDROCHLORIDE 50 MG: 50 TABLET ORAL at 20:35

## 2022-04-10 RX ADMIN — HYDROXYZINE HYDROCHLORIDE 50 MG: 50 TABLET, FILM COATED ORAL at 07:41

## 2022-04-10 RX ADMIN — ACETAMINOPHEN 650 MG: 325 TABLET ORAL at 12:16

## 2022-04-10 RX ADMIN — HYDROXYZINE HYDROCHLORIDE 50 MG: 50 TABLET, FILM COATED ORAL at 20:35

## 2022-04-10 ASSESSMENT — PAIN DESCRIPTION - PAIN TYPE: TYPE: ACUTE PAIN

## 2022-04-10 ASSESSMENT — PAIN SCALES - GENERAL
PAINLEVEL_OUTOF10: 0
PAINLEVEL_OUTOF10: 8
PAINLEVEL_OUTOF10: 8

## 2022-04-10 ASSESSMENT — PAIN DESCRIPTION - LOCATION: LOCATION: HEAD

## 2022-04-10 NOTE — PROGRESS NOTES
[facility]  Speech Language Pathology    Date: 4/10/2022  Patient Name: Suad Tapia  YOB: 1970   AGE: 45 y.o. MRN: [id number]        Patient Not Available for Speech Therapy     Due to:  [] Testing  [] Hemodialysis  [] Cancelled by RN  [] Surgery   [] Intubation/Sedation/Pain Medication  [] Medical instability  [x] Other: Order received for MBS, but radiology unable to accommodate today as radiologist is unavailable. Next scheduled treatment: plan for MBS tomorrow (04/11)    Completed by:  Eddi Torres M.A., CCC-SLP

## 2022-04-10 NOTE — GROUP NOTE
HS Group    Date: April 9, 2022    Patient did not participate in HS group. 1:1 talk time was offered as an alternative. Will continue to encourage patient to participate in unit programming.     Signature: RENEE Paz

## 2022-04-10 NOTE — PLAN OF CARE
Problem: Altered Mood, Depressive Behavior:  Goal: Ability to disclose and discuss suicidal ideas will improve  Description: Ability to disclose and discuss suicidal ideas will improve  4/10/2022 1023 by Ken Decker RN  Outcome: Ongoing  Patient is alert, observed in day area. Patient is flat on approach. Patient is currently denying thoughts of wanting to harm self or others. Patient reports not having any tactile, gustatory, auditory, olfactory, or visual hallucinations. Patient admits to having anxiety and depression relating to being homeless. Reassurance and support given. Coping skills explored and discussed. Patient has been isolative to room, only coming out for meals and needs. Patient is medication compliant, denies having any side effects. Patient reports adequate sleep and appetite. Patient reports she is working with SW into finding placement when discharge. No further concerns voiced, will continue to monitor.

## 2022-04-10 NOTE — PROGRESS NOTES
Daily Progress Note  4/10/2022    Patient Name: Joann Christopher:  Depression with suicidal ideation with plan and intent to use a knife to cut self          SUBJECTIVE:   Patient is seen for follow up assessment in assigned room on unit. Nursing staff report patient has been isolative to room, coming out for needs, medication adherent and behaviorally in control. Patient is resting in bed, startles awake stating \"what?!\" from verbal stimuli, agreeable to conversation. Patient states she doesn't know how to communicate her mood, \"I don't know. \"  Patient is less irritable and hostile toward writer today and more willing to engage in sustained conversation. Patient endorses ongoing suicidal ideation then states she feels \"dazed and confused. \" She reports she attended group in the morning. She states she was able to get \"a little sleep\" but still feels restless. She is in communication with social work about post-discharge placement options. She is feeling anxious regarding her upcoming swallow study, but hopeful for results as to why she has been having difficulty. Patient is unable to contract for safety in the community and warrants hospitalization for stabilization, medication management, and therapeutic groups and milieu.      Compliant with scheduled medications: [x] Yes    [] No  Seroquel    Received emergency medications in past 24 hrs: [] Yes   [x] No    Appetite:  [] Normal/Adequate/Unchanged  [] Increased  [x] Decreased      Sleep:       [] Normal/Adequate/Unchanged  [x] Fair  [] Poor      Group Attendance on Unit:   [] Yes  [x] Selectively    [] No    Medication Side Effects: Denies         Mental Status Exam  Level of consciousness: Alert and awake   Appearance: Appropriate attire for setting, resting in bed, with fair  grooming and hygiene   Behavior/Motor: Guarded, restless  Attitude toward examiner: Cooperative, attentive, fair eye contact   Speech: Anxious tone, normal volume, normal rate   Mood:  Anxious, tired  Affect: Mood congruent  Thought processes: Goal directed, linear   Thought content: Denies homicidal ideation  Suicidal Ideation: Endorses suicidal ideation; unable to contract for safety in community   Delusions: None evident  Perceptual Disturbance: patient is not observed responding to internal stimuli  Cognition: Oriented to self, location, time, and situation  Memory: Intact  Insight & Judgement: Poor    Data   height is 5' 9\" (1.753 m) and weight is 120 lb (54.4 kg). Her oral temperature is 98.1 °F (36.7 °C). Her blood pressure is 144/103 (abnormal) and her pulse is 90. Her respiration is 14 and oxygen saturation is 99%.    Labs:   Admission on 04/09/2022   Component Date Value Ref Range Status    Lithium Lvl 04/09/2022 <0.1* 0.6 - 1.2 mmol/L Final    Lithium Dose Amount 04/09/2022 reschedule   Final    Lithium Date Last Dose 04/09/2022 rescheduled   Final    Lithium Dose Time 04/09/2022 reschedule   Final   Admission on 04/08/2022, Discharged on 04/09/2022   Component Date Value Ref Range Status    WBC 04/08/2022 4.8  3.5 - 11.3 k/uL Final    RBC 04/08/2022 3.66* 3.95 - 5.11 m/uL Final    Hemoglobin 04/08/2022 12.4  11.9 - 15.1 g/dL Final    Hematocrit 04/08/2022 36.5  36.3 - 47.1 % Final    MCV 04/08/2022 99.7  82.6 - 102.9 fL Final    MCH 04/08/2022 33.9* 25.2 - 33.5 pg Final    MCHC 04/08/2022 34.0  28.4 - 34.8 g/dL Final    RDW 04/08/2022 13.2  11.8 - 14.4 % Final    Platelets 53/15/2161 262  138 - 453 k/uL Final    MPV 04/08/2022 9.2  8.1 - 13.5 fL Final    NRBC Automated 04/08/2022 0.0  0.0 per 100 WBC Final    Seg Neutrophils 04/08/2022 48  36 - 65 % Final    Lymphocytes 04/08/2022 38  24 - 43 % Final    Monocytes 04/08/2022 11  3 - 12 % Final    Eosinophils % 04/08/2022 2  1 - 4 % Final    Basophils 04/08/2022 1  0 - 2 % Final    Immature Granulocytes 04/08/2022 0  0 % Final    Segs Absolute 04/08/2022 2.29  1.50 - 8.10 k/uL Final    Absolute Lymph # 04/08/2022 1.83  1.10 - 3.70 k/uL Final    Absolute Mono # 04/08/2022 0.54  0.10 - 1.20 k/uL Final    Absolute Eos # 04/08/2022 0.10  0.00 - 0.44 k/uL Final    Basophils Absolute 04/08/2022 0.03  0.00 - 0.20 k/uL Final    Absolute Immature Granulocyte 04/08/2022 <0.03  0.00 - 0.30 k/uL Final    Glucose 04/08/2022 96  70 - 99 mg/dL Final    BUN 04/08/2022 20  6 - 20 mg/dL Final    CREATININE 04/08/2022 1.00* 0.50 - 0.90 mg/dL Final    Calcium 04/08/2022 8.9  8.6 - 10.4 mg/dL Final    Sodium 04/08/2022 146* 135 - 144 mmol/L Final    Potassium 04/08/2022 4.0  3.7 - 5.3 mmol/L Final    Chloride 04/08/2022 111* 98 - 107 mmol/L Final    CO2 04/08/2022 24  20 - 31 mmol/L Final    Anion Gap 04/08/2022 11  9 - 17 mmol/L Final    Alkaline Phosphatase 04/08/2022 114* 35 - 104 U/L Final    ALT 04/08/2022 15  5 - 33 U/L Final    AST 04/08/2022 16  <32 U/L Final    Total Bilirubin 04/08/2022 0.43  0.3 - 1.2 mg/dL Final    Total Protein 04/08/2022 6.2* 6.4 - 8.3 g/dL Final    Albumin 04/08/2022 4.0  3.5 - 5.2 g/dL Final    Albumin/Globulin Ratio 04/08/2022 1.8  1.0 - 2.5 Final    GFR Non- 04/08/2022 58* >60 mL/min Final    GFR  04/08/2022 >60  >60 mL/min Final    GFR Comment 04/08/2022        Final    Comment: Average GFR for 52-63 years old:   80 mL/min/1.73sq m  Chronic Kidney Disease:   <60 mL/min/1.73sq m  Kidney failure:   <15 mL/min/1.73sq m              eGFR calculated using average adult body mass.  Additional eGFR calculator available at:        [email or web address]            Acetaminophen Level 04/08/2022 <5* 10 - 30 ug/mL Final    Ethanol 04/08/2022 <10  <10 mg/dL Final    Ethanol percent 04/08/2022 <0.010  <5.656 % Final    Salicylate Lvl 39/44/9417 <1* 3 - 10 mg/dL Final    Toxic Tricyclic Sc,Blood 38/57/5278 NEGATIVE  NEGATIVE Final    Amphetamine Screen, Ur 04/08/2022 NEGATIVE  NEGATIVE Final    Comment:       (Positive cutoff 1000 ng/mL)                  Barbiturate Screen, Ur 04/08/2022 NEGATIVE  NEGATIVE Final    Comment:       (Positive cutoff 200 ng/mL)                  Benzodiazepine Screen, Urine 04/08/2022 NEGATIVE  NEGATIVE Final    Comment:       (Positive cutoff 200 ng/mL)                  Cocaine Metabolite, Urine 04/08/2022 POSITIVE* NEGATIVE Final    Comment:       (Positive cutoff 300 ng/mL)                  Methadone Screen, Urine 04/08/2022 NEGATIVE  NEGATIVE Final    Comment:       (Positive cutoff 300 ng/mL)                  Opiates, Urine 04/08/2022 NEGATIVE  NEGATIVE Final    Comment:       (Positive cutoff 300 ng/mL)                  Phencyclidine, Urine 04/08/2022 NEGATIVE  NEGATIVE Final    Comment:       (Positive cutoff 25 ng/mL)                  Cannabinoid Scrn, Ur 04/08/2022 POSITIVE* NEGATIVE Final    Comment:       (Positive cutoff 50 ng/mL)                  Oxycodone Screen, Ur 04/08/2022 NEGATIVE  NEGATIVE Final    Comment:       (Positive cutoff 100 ng/mL)                  Test Information 04/08/2022 Assay provides medical screening only. The absence of expected drug(s) and/or metabolite(s) may indicate diluted or adulterated urine, limitations of testing or timing of collection. Final    Comment: Testing for legal purposes should be confirmed by another method. To request confirmation   of test result, please call the lab within 7 days of sample submission.  Specimen Description 04/08/2022 . NASOPHARYNGEAL SWAB   Final    SARS-CoV-2, Rapid 04/08/2022 Not Detected  Not Detected Final    Comment:       Rapid NAAT:  The specimen is NEGATIVE for SARS-CoV-2, the novel coronavirus associated with   COVID-19. The ID NOW COVID-19 assay is designed to detect the virus that causes COVID-19 in patients   with signs and symptoms of infection who are suspected of COVID-19.   An individual without symptoms of COVID-19 and who is not shedding SARS-CoV-2 virus would   expect to have a negative (not detected) result in this assay. Negative results should be treated as presumptive and, if inconsistent with clinical signs   and symptoms or necessary for patient management,  should be tested with an alternative molecular assay. Negative results do not preclude   SARS-CoV-2 infection and   should not be used as the sole basis for patient management decisions. Fact sheet for Healthcare Providers: Jonathan.jay  Fact sheet for Patients: Jonathan.jay          Methodology: Isothermal Nucleic Acid Amplification           Reviewed patient's current plan of care and vital signs with nursing staff. Labs reviewed: [x] Yes  Last EKG in EMR reviewed: [x] Yes    Medications  Current Facility-Administered Medications: amLODIPine (NORVASC) tablet 5 mg, 5 mg, Oral, Daily  acetaminophen (TYLENOL) tablet 650 mg, 650 mg, Oral, Q4H PRN  aluminum & magnesium hydroxide-simethicone (MAALOX) 200-200-20 MG/5ML suspension 30 mL, 30 mL, Oral, Q6H PRN  hydrOXYzine (ATARAX) tablet 50 mg, 50 mg, Oral, TID PRN  ibuprofen (ADVIL;MOTRIN) tablet 400 mg, 400 mg, Oral, Q6H PRN  polyethylene glycol (GLYCOLAX) packet 17 g, 17 g, Oral, Daily PRN  traZODone (DESYREL) tablet 50 mg, 50 mg, Oral, Nightly PRN  QUEtiapine (SEROQUEL XR) extended release tablet 300 mg, 300 mg, Oral, Nightly  gabapentin (NEURONTIN) capsule 100 mg, 100 mg, Oral, BID PRN    ASSESSMENT  Bipolar I disorder, most recent episode depressed, severe without psychotic features ([facility] [address]         PLAN  Patient symptoms are: Modestly improving  Continue current medication regimen. Monitor need and frequency of PRN medications. Encourage participation in groups and milieu. Attempt to develop insight. Supportive Therapy conducted. Probable discharge is per attending physician. Follow-up daily while inpatient.      Patient continues to be monitored in the inpatient psychiatric facility at [facility] for safety and stabilization. Patient continues to need, on a daily basis, active treatment furnished directly by or requiring the supervision of inpatient psychiatric personnel. Electronically signed by Levi Claros RN, student nurse practitioner on 4/10/2022 at 3:47 PM    Electronically signed by Maine Avila CNP  APRN, on 4/10/2022 at 4:56 PM    **This report has been created using voice recognition software. It may contain minor errors which are inherent in voice recognition technology. **

## 2022-04-10 NOTE — BH NOTE
Patient has an order for [facility] MODIFIED BARIUM SWALLOW W VIDEO due to dysphagia, Dr Beatrice Nichole put order in. Speech Therapy called and stated they are no radiologists in for today (Sunday), test will be done Monday. Patient informed.

## 2022-04-10 NOTE — BH NOTE
Patient has an order for CONSULT GI-DYSPHAGIA. GI DR in to see patient at this time and date. N.O. per Magda Llamas NP for ENDO PROCEDURE, NPO after midnight, and COVID-19. Patient was swabbed 4/8 and testing was negative. Magda Llamas NP was perfect served asking if patient needs to be re-tested, NP stated no. COVID-19 testing cancelled.

## 2022-04-10 NOTE — GROUP NOTE
Group Therapy Note    Date: 4/10/2022    Group Start Time: 1000  Group End Time: 7341  Group Topic: Psychotherapy    DENG MOTA CHANG    Cheri York     Brief 1:1      DENG DE LOS SANTOS    Rooms [phone], [phone] out of 18    PT participated in 1:1 individual brief therapy and engages in conversation regarding discharge and safety planning, and short- and long-term goals. PT able to identify all goals as well as starting to plan for discharge.     Patient's Goal:  To actively participate in 1:1 meeting and to start discharge and safety planning as well as given a journal.  Discipline Responsible: /Counselor      Signature:  Bobby Hutchins MSW, LSW

## 2022-04-10 NOTE — CONSULTS
Gastroenterology Consult Note      Patient: Christina Palma  : 1970  Acct#:  [id number]     Date:  4/10/2022    Subjective:       History of Present Illness  Patient is a 45 y.o.  female admitted with Depression with suicidal ideation [F32. Cleven Aid who is seen in consult for malnutrition and dysphagia  This is a 79-year-old lady who is admitted at this time in the psych unit  With bipolar disorder depression etc.  We were consulted because she reported significant dysphagia to solids been having for at least 2 to 3 months  With some description of might be food impaction. Here or there  No issues with liquids swallowing  She did lose weight significantly seems to be 143 in November and she dropped right now to 115/120 pounds  She did not have any significant heartburn but she does have some neuropathy she said  Never had any colonoscopy she is past due she said  Patient is very malnourished in her BMI is 17  Never seen by GI  Liver enzymes are normal except slight elevation of the alkaline phosphatase to 114  Hemoglobin is normal at 12.4 platelet number is normal                  Past Medical History:   Diagnosis Date    Psychiatric problem       History reviewed. No pertinent surgical history. Past Endoscopic History none    Admission Meds  No current facility-administered medications on file prior to encounter. Current Outpatient Medications on File Prior to Encounter   Medication Sig Dispense Refill    gabapentin (NEURONTIN) 100 MG capsule Take 1 capsule by mouth 2 times daily as needed (foot pain) for up to 12 doses.  Intended supply: 30 days 12 capsule 0    ibuprofen (ADVIL;MOTRIN) 600 MG tablet Take 1 tablet by mouth every 6 hours as needed for Pain 28 tablet 0       Patient   Does Use ASA, NSAID Yes  Allergies  Allergies   Allergen Reactions    Pcn [Penicillins]         Social   Social History     Tobacco Use    Smoking status: Current Every Day Smoker     Packs/day: 1.00     Types: Cigarettes    Smokeless tobacco: Never Used   Substance Use Topics    Alcohol use: Not Currently        PSYCH HISTORY:  Depression No  Anxiety No  Suicide No       History reviewed. No pertinent family history. No family history of colon cancer, Crohn's disease, or ulcerative colitis. Review of Systems  Constitutional: negative  Eyes: negative  Ears, nose, mouth, throat, and face: negative  Respiratory: negative  Cardiovascular: negative  Gastrointestinal: negative  Genitourinary:negative  Integument/breast: negative  Hematologic/lymphatic: negative  Musculoskeletal:negative  Endocrine: negative           Physical Exam  Blood pressure (!) 144/103, pulse 90, temperature 98.1 °F (36.7 °C), temperature source Oral, resp. rate 14, height 5' 9\" (1.753 m), weight 120 lb (54.4 kg), SpO2 99 %.          General Appearance: alert and oriented to person, place and time, well-developed and well-nourished, in no acute distress  Skin: warm and dry, no rash or erythema  Head: normocephalic and atraumatic  Eyes: pupils equal, round, and reactive to light, extraocular eye movements intact, conjunctivae normal  ENT: hearing grossly normal bilaterally  Neck: neck supple and non tender without mass, no thyromegaly or thyroid nodules, no cervical lymphadenopathy   Pulmonary/Chest: clear to auscultation bilaterally- no wheezes, rales or rhonchi, normal air movement, no respiratory distress  Cardiovascular: normal rate, regular rhythm, normal S1 and S2, no murmurs, rubs, clicks or gallops, distal pulses intact, no carotid bruits  Abdomen: soft, non-tender, non-distended, normal bowel sounds, no masses or organomegaly  Extremities: no cyanosis, clubbing or edema  Musculoskeletal: normal range of motion, no joint swelling, deformity or tenderness  Neurologic: no cranial nerve deficit and muscle strength normal    Data Review:    Recent Labs     04/08/22  1842   WBC 4.8   HGB 12.4   HCT 36.5   MCV 99.7    Recent Labs     04/08/22  1842   *   K 4.0   *   CO2 24   BUN 20   CREATININE 1.00*     Recent Labs     04/08/22  1842   AST 16   ALT 15   BILITOT 0.43   ALKPHOS 114*     No results for input(s): LIPASE, AMYLASE in the last 72 hours. No results for input(s): PROTIME, INR in the last 72 hours. No results for input(s): PTT in the last 72 hours. No results for input(s): OCCULTBLD in the last 72 hours. CEA:  No results found for: CEA  Ca 125:  No results found for:   Ca 19-9:  No results found for:   Ca 15-3:  No results found for:   AFP:  No components found for: AFAFP  Beta HCG:  No components found for: BHCG  Neuron Specific Enolase:  No results found for: NSE  Imaging Studies:                           All appropriate imaging studies and reports reviewed: Yes                 Assessment:     Principal Problem:    Bipolar I disorder, most recent episode depressed, severe without psychotic features ([address]  Active Problems:    Cocaine abuse ([facility] [address]    Cannabis abuse    Severe protein-calorie malnutrition ([address]  Resolved Problems:    * No resolved hospital problems. *    Dysphagia to solid  Significant weight loss and malnutrition with BMI of 17  No significant acid reflux symptoms  Slight elevation of the alkaline phosphatase  NSAIDs use    Recommendations:   EGD tomorrow  PPI  Repeat alkaline phosphatase  DC NSAIDs  Colonoscopy for screening later on  Based on the results of the EGD will plan further management plan                      Thank you for allowing me to participate in the care of your patient. Please feel free to contact me with any questions or concerns.      Rk Monzon MD

## 2022-04-10 NOTE — H&P
[address] [facility]  Anand Collier MD; Jameson Orellana MD; Nichole Ponce MD; MD Ho Spain MD; MD MELANIE Medley [facility] Internal Medicine   [facility]    HISTORY AND PHYSICAL EXAMINATION            Date:   4/10/2022  Patient name:  Zaida Hills  Date of admission:  4/9/2022  3:35 AM  MRN:   [id number]  Account:  [de-identified]  YOB: 1970  PCP:    Toney Ackerman  Room:   0208/0208-  Code Status:    Full Code    Chief Complaint:     No chief complaint on file.  htn  Wt loss      History Obtained From:   Patient  Medical record nursing staff    History of Present Illness:     Zaida Hills is a 45 y.o. Non- / non  female who presents with No chief complaint on file. and is admitted to the hospital for the management of Bipolar I disorder, most recent episode depressed, severe without psychotic features ([facility] [address] HTN  Onset more than 2 years ago  lexie mild to mod  unControlled with current po meds  Not associated with headaches or blurry vision  No chest pain  25-year-old lady with a severe protein calorie malnutrition BMI 17 has trouble swallowing and sometimes pain for last few months denies any outpatient work-up        Past Medical History:     Past Medical History:   Diagnosis Date    Psychiatric problem         Past Surgical History:     History reviewed. No pertinent surgical history. Medications Prior to Admission:     Prior to Admission medications    Medication Sig Start Date End Date Taking? Authorizing Provider   gabapentin (NEURONTIN) 100 MG capsule Take 1 capsule by mouth 2 times daily as needed (foot pain) for up to 12 doses.  Intended supply: 30 days 3/28/22 3/31/22  Esperanza Álvarez DO   ibuprofen (ADVIL;MOTRIN) 600 MG tablet Take 1 tablet by mouth every 6 hours as needed for Pain 3/27/22 4/3/22  Esperanza Álvarez DO        Allergies:     Pcn [penicillins]    Social History:     Tobacco:    reports that she has been smoking cigarettes. She has been smoking about 1.00 pack per day. She has never used smokeless tobacco.  Alcohol:      reports previous alcohol use. Drug Use:  reports previous drug use. Family History:     History reviewed. No pertinent family history. Review of Systems:     Positive and Negative as described in HPI. CONSTITUTIONAL:  negative for fevers, chills, sweats, fatigue, weight loss  HEENT:  negative for vision, hearing changes, runny nose, throat pain  RESPIRATORY:  negative for shortness of breath, cough, congestion, wheezing  CARDIOVASCULAR:  negative for chest pain, palpitations  GASTROINTESTINAL: Dysphagia odynophagia  GENITOURINARY:  negative for difficulty of urination, burning with urination, frequency   INTEGUMENT:  negative for rash, skin lesions, easy bruising   HEMATOLOGIC/LYMPHATIC:  negative for swelling/edema   ALLERGIC/IMMUNOLOGIC:  negative for urticaria , itching  ENDOCRINE:  negative increase in drinking, increase in urination, hot or cold intolerance  MUSCULOSKELETAL:  negative joint pains, muscle aches, swelling of joints  NEUROLOGICAL:  negative for headaches, dizziness, lightheadedness, numbness, pain, tingling extremities      Physical Exam:   BP (!) 144/103 Comment: RN notified  Pulse 90   Temp 98.1 °F (36.7 °C) (Oral)   Resp 14   Ht 5' 9\" (1.753 m)   Wt 120 lb (54.4 kg)   SpO2 99%   BMI 17.72 kg/m²   Temp (24hrs), Av.1 °F (36.7 °C), Min:98.1 °F (36.7 °C), Max:98.1 °F (36.7 °C)    No results for input(s): POCGLU in the last 72 hours.   No intake or output data in the 24 hours ending 04/10/22 1202  Severe protein calorie malnutrition  General Appearance: alert, well appearing, and in no acute distress  Mental status: oriented to person, place, and time  Head: normocephalic, atraumatic  Eye: no icterus, redness, pupils equal and reactive, extraocular eye movements intact, conjunctiva clear  Ear: normal external ear, no discharge, hearing intact  Nose: no drainage noted  Mouth: mucous membranes moist  Neck: supple, no carotid bruits, thyroid not palpable  Lungs: Bilateral equal air entry, clear to ausculation, no wheezing, rales or rhonchi, normal effort  Cardiovascular: normal rate, regular rhythm, no murmur, gallop, rub  Abdomen: Soft, nontender, nondistended, normal bowel sounds, no hepatomegaly or splenomegaly  Neurologic: There are no new focal motor or sensory deficits, normal muscle tone and bulk, no abnormal sensation, normal speech, cranial nerves II through XII grossly intact  Skin: No gross lesions, rashes, bruising or bleeding on exposed skin area  Extremities: peripheral pulses palpable, no pedal edema or calf pain with palpation  P    Investigations:      Laboratory Testing:  Recent Results (from the past 24 hour(s))   Lithium Level    Collection Time: 04/09/22  1:16 PM   Result Value Ref Range    Lithium Lvl <0.1 (L) 0.6 - 1.2 mmol/L    Lithium Dose Amount reschedule     Lithium Date Last Dose rescheduled     Lithium Dose Time reschedule        Imaging/Diagnostics:  No results found. Assessment :      Hospital Problems           Last Modified POA    * (Principal) Bipolar I disorder, most recent episode depressed, severe without psychotic features ([address] 4/9/2022 Yes    Cocaine abuse ([address] 4/9/2022 Yes    Cannabis abuse 4/9/2022 Yes    Severe protein-calorie malnutrition ([facility] [address] 4/10/2022 Yes          Plan:     77-year-old lady with a severe protein calorie malnutrition few month history of dysphagia and odynophagia has not had any outpatient consultation  Will request GI for EGD  Barium swallow in the morning  Hypertension start Norvasc 5 mg      Dahlia Greenwood MD  4/10/2022  12:02 PM    Copy sent to Dr. Terra Luna    Please note that this chart was generated using voice recognition Dragon dictation software.   Although every effort was made to ensure the accuracy of this automated transcription, some errors in transcription may have occurred.

## 2022-04-10 NOTE — PLAN OF CARE
[address] [place] [address]  Day 3 Interdisciplinary Treatment Plan NOTE    Review Date & Time: 4/10/2022                 1010am    Admission Type:   Admission Type: Voluntary    Reason for admission:  Reason for Admission: Pt having suicidal ideation, found in garage with knives. Poor relationship with daughter.   Estimated Length of Stay: 5-7 days  Estimated Discharge Date Update: to be determined by physician    PATIENT STRENGTHS:  Patient Strengths Strengths: Communication,Connection to output provider,Motivated,No significant Physical Illness  Patient Strengths and Limitations:Limitations: Tendency to isolate self,Difficult relationships / poor social skills  Addictive Behavior:Addictive Behavior  In the past 3 months, have you felt or has someone told you that you have a problem with:  : None  Do you have a history of Chemical Use?: No  Do you have a history of Alcohol Use?: No  Do you have a history of Street Drug Abuse?: Yes  Histroy of Prescripton Drug Abuse?: No  Medical Problems:  Past Medical History:   Diagnosis Date    Psychiatric problem        Risk:  Fall RiskTotal: 57  James Scale James Scale Score: 22  BVC    Change in scores no Changes to plan of Care no    Status EXAM:   Status and Exam  Normal: No  Facial Expression: Flat  Affect: Appropriate  Level of Consciousness: Lethargic  Mood:Normal: No  Mood: Depressed,Anxious  Motor Activity:Normal: Yes  Interview Behavior: Cooperative  Preception: [place] to Time,[place] to Person,[place] to Place,[place] to Situation  Attention:Normal: No  Attention: Unable to Concentrate  Thought Processes: Circumstantial  Thought Content:Normal: No  Thought Content: Preoccupations  Hallucinations: None  Delusions: No  Memory:Normal: Yes  Memory: Poor Recent  Insight and Judgment: No  Insight and Judgment: Poor Judgment,Poor Insight,Unmotivated  Present Suicidal Ideation: Yes  Present Homicidal Ideation: No    Daily Assessment Last Entry:             Patient Currently in Pain: Other (comment) (states back pain from bed)  Daily Nutrition (WDL): Within Defined Limits    Patient Monitoring:  Frequency of Checks: 4 times per hour, close    Psychiatric Symptoms:   Depression Symptoms  Depression Symptoms: Feelings of hopelessess,Feelings of helplessness,Loss of interest,Isolative  Anxiety Symptoms  Anxiety Symptoms: Generalized  Jacklyn Symptoms  Jacklyn Symptoms: No problems reported or observed. Psychosis Symptoms  Delusion Type: No problems reported or observed. Suicide Risk CSSR-S:  1) Within the past month, have you wished you were dead or wished you could go to sleep and not wake up? : Yes  2) Have you actually had any thoughts of killing yourself? : No  3) Have you been thinking about how you might kill yourself? : No  5) Have you started to work out or worked out the details of how to kill yourself? Do you intend to carry out this plan? : No  6) Have you ever done anything, started to do anything, or prepared to do anything to end your life?: No  Change in Result                   no                     Change in Plan of care                      no      EDUCATION:   EDUCATION:   Learner Progress Toward Treatment Goals: Reviewed results and recommendations of this team, Reviewed group plan and strategies, Reviewed signs, symptoms and risk of self harm and violent behavior, Reviewed goals and plan of care    Method:small group, individual verbal education    Outcome:verbalized by patient, but needs reinforcement to obtain goals    PATIENT GOALS:  Short term:\"get into inpatient recovery tx/ housing\"  Long term: \"get GED,back to school, become CD Counselor\".     PLAN/TREATMENT RECOMMENDATIONS UPDATE: continue with group therapies, increased socialization, continue planning for after discharge goals, continue with medication compliance    SHORT-TERM GOALS UPDATE:   Time frame for Short-Term Goals: 5-7 days    LONG-TERM GOALS UPDATE:   Time frame for Long-Term Goals: 6 months  Members Present in Team Meeting: See Signature Sheet    Joshua Randle

## 2022-04-10 NOTE — GROUP NOTE
Group Therapy Note    Date: 4/10/2022    Group Start Time: 4046  Group End Time: 4952  Group Topic: Group Documentation    [facility]    Keith Whitley RN    Patient refused to attend group at this time. Patient was encouraged and educated on the benefits of group.     Signature:  Keith Whitley RN

## 2022-04-10 NOTE — CARE COORDINATION
DISCHARGE PLANNING:   - [facility] [place] [facility] for possible [facility] for early next week and places information in charge. - Writer provides PT with phone number to call for phone intake.   - [facility] provides PT with GED class information as well as the [facility] program

## 2022-04-10 NOTE — PLAN OF CARE
Problem: Altered Mood, Depressive Behavior:  Goal: Able to verbalize support systems  Description: Able to verbalize support systems  4/9/2022 2326 by Shala Galvez RN  Outcome: Ongoing     Problem: Altered Mood, Depressive Behavior:  Goal: Ability to disclose and discuss suicidal ideas will improve  Description: Ability to disclose and discuss suicidal ideas will improve  4/9/2022 2326 by Shala Galvez RN  Outcome: Ongoing  Note: Pt denies thoughts of self harm and is agreeable to seeking out should thoughts of self harm arise. Safe environment maintained. Q15 minute checks for safety cont per unit policy. Will cont to monitor for safety and provides support and reassurance as needed.

## 2022-04-10 NOTE — GROUP NOTE
Group Therapy Note    Date: 4/9/2022    Group Start Time: 1000  Group End Time: 1300  Group Topic: Psychotherapy    [facility]    Jessica Prudent    Brief 1:1      [facility] C/D    Rooms 219-236 and 11/17    PT participated in 1:1 individual brief therapy and engages in conversation regarding discharge and safety planning, and short- and long-term goals. PT able to identify all goals as well as starting to plan for discharge.     Patient's Goal:  To actively participate in 1:1 meeting and to start discharge and safety planning as well as given a journal.    Discipline Responsible: /Counselor      Signature:  Raquel Ansari MSW, LSW

## 2022-04-11 ENCOUNTER — ANESTHESIA EVENT (OUTPATIENT)
Dept: ENDOSCOPY | Age: 52
End: 2022-04-11

## 2022-04-11 ENCOUNTER — ANESTHESIA (OUTPATIENT)
Dept: ENDOSCOPY | Age: 52
End: 2022-04-11

## 2022-04-11 VITALS — SYSTOLIC BLOOD PRESSURE: 142 MMHG | OXYGEN SATURATION: 87 % | DIASTOLIC BLOOD PRESSURE: 103 MMHG

## 2022-04-11 PROBLEM — R62.7 FAILURE TO THRIVE IN ADULT: Status: ACTIVE | Noted: 2022-04-11

## 2022-04-11 PROBLEM — R13.19 OTHER DYSPHAGIA: Status: ACTIVE | Noted: 2022-04-11

## 2022-04-11 LAB — TSH SERPL DL<=0.05 MIU/L-ACNC: 2.83 UIU/ML (ref 0.3–5)

## 2022-04-11 PROCEDURE — 99232 SBSQ HOSP IP/OBS MODERATE 35: CPT | Performed by: INTERNAL MEDICINE

## 2022-04-11 PROCEDURE — 6370000000 HC RX 637 (ALT 250 FOR IP): Performed by: PSYCHIATRY & NEUROLOGY

## 2022-04-11 PROCEDURE — APPSS30 APP SPLIT SHARED TIME 16-30 MINUTES: Performed by: NURSE PRACTITIONER

## 2022-04-11 PROCEDURE — 6370000000 HC RX 637 (ALT 250 FOR IP): Performed by: INTERNAL MEDICINE

## 2022-04-11 PROCEDURE — 7100000001 HC PACU RECOVERY - ADDTL 15 MIN: Performed by: INTERNAL MEDICINE

## 2022-04-11 PROCEDURE — 88342 IMHCHEM/IMCYTCHM 1ST ANTB: CPT

## 2022-04-11 PROCEDURE — 2709999900 HC NON-CHARGEABLE SUPPLY: Performed by: INTERNAL MEDICINE

## 2022-04-11 PROCEDURE — 43239 EGD BIOPSY SINGLE/MULTIPLE: CPT | Performed by: INTERNAL MEDICINE

## 2022-04-11 PROCEDURE — 88305 TISSUE EXAM BY PATHOLOGIST: CPT

## 2022-04-11 PROCEDURE — 0DB68ZX EXCISION OF STOMACH, VIA NATURAL OR ARTIFICIAL OPENING ENDOSCOPIC, DIAGNOSTIC: ICD-10-PCS | Performed by: INTERNAL MEDICINE

## 2022-04-11 PROCEDURE — 6360000002 HC RX W HCPCS: Performed by: NURSE ANESTHETIST, CERTIFIED REGISTERED

## 2022-04-11 PROCEDURE — 3700000001 HC ADD 15 MINUTES (ANESTHESIA): Performed by: INTERNAL MEDICINE

## 2022-04-11 PROCEDURE — 7100000000 HC PACU RECOVERY - FIRST 15 MIN: Performed by: INTERNAL MEDICINE

## 2022-04-11 PROCEDURE — 99232 SBSQ HOSP IP/OBS MODERATE 35: CPT | Performed by: PSYCHIATRY & NEUROLOGY

## 2022-04-11 PROCEDURE — 2580000003 HC RX 258: Performed by: ANESTHESIOLOGY

## 2022-04-11 PROCEDURE — 0DB98ZX EXCISION OF DUODENUM, VIA NATURAL OR ARTIFICIAL OPENING ENDOSCOPIC, DIAGNOSTIC: ICD-10-PCS | Performed by: INTERNAL MEDICINE

## 2022-04-11 PROCEDURE — 3609012400 HC EGD TRANSORAL BIOPSY SINGLE/MULTIPLE: Performed by: INTERNAL MEDICINE

## 2022-04-11 PROCEDURE — 2500000003 HC RX 250 WO HCPCS: Performed by: NURSE ANESTHETIST, CERTIFIED REGISTERED

## 2022-04-11 PROCEDURE — 0DB58ZX EXCISION OF ESOPHAGUS, VIA NATURAL OR ARTIFICIAL OPENING ENDOSCOPIC, DIAGNOSTIC: ICD-10-PCS | Performed by: INTERNAL MEDICINE

## 2022-04-11 PROCEDURE — 3700000000 HC ANESTHESIA ATTENDED CARE: Performed by: INTERNAL MEDICINE

## 2022-04-11 PROCEDURE — 36415 COLL VENOUS BLD VENIPUNCTURE: CPT

## 2022-04-11 PROCEDURE — 84443 ASSAY THYROID STIM HORMONE: CPT

## 2022-04-11 PROCEDURE — 1240000000 HC EMOTIONAL WELLNESS R&B

## 2022-04-11 RX ORDER — SUCRALFATE 1 G/1
1 TABLET ORAL EVERY 6 HOURS SCHEDULED
Status: DISCONTINUED | OUTPATIENT
Start: 2022-04-11 | End: 2022-04-15 | Stop reason: HOSPADM

## 2022-04-11 RX ORDER — SODIUM CHLORIDE 0.9 % (FLUSH) 0.9 %
5-40 SYRINGE (ML) INJECTION EVERY 12 HOURS SCHEDULED
Status: DISCONTINUED | OUTPATIENT
Start: 2022-04-11 | End: 2022-04-11

## 2022-04-11 RX ORDER — QUETIAPINE 400 MG/1
400 TABLET, FILM COATED, EXTENDED RELEASE ORAL NIGHTLY
Status: DISCONTINUED | OUTPATIENT
Start: 2022-04-11 | End: 2022-04-12

## 2022-04-11 RX ORDER — MEPERIDINE HYDROCHLORIDE 25 MG/ML
12.5 INJECTION INTRAMUSCULAR; INTRAVENOUS; SUBCUTANEOUS EVERY 5 MIN PRN
Status: DISCONTINUED | OUTPATIENT
Start: 2022-04-11 | End: 2022-04-15 | Stop reason: HOSPADM

## 2022-04-11 RX ORDER — GABAPENTIN 100 MG/1
100 CAPSULE ORAL 3 TIMES DAILY
Status: DISCONTINUED | OUTPATIENT
Start: 2022-04-11 | End: 2022-04-15 | Stop reason: HOSPADM

## 2022-04-11 RX ORDER — PANTOPRAZOLE SODIUM 40 MG/1
40 TABLET, DELAYED RELEASE ORAL
Status: DISCONTINUED | OUTPATIENT
Start: 2022-04-12 | End: 2022-04-15 | Stop reason: HOSPADM

## 2022-04-11 RX ORDER — DIPHENHYDRAMINE HYDROCHLORIDE 50 MG/ML
12.5 INJECTION INTRAMUSCULAR; INTRAVENOUS
Status: ACTIVE | OUTPATIENT
Start: 2022-04-11 | End: 2022-04-11

## 2022-04-11 RX ORDER — SODIUM CHLORIDE, SODIUM LACTATE, POTASSIUM CHLORIDE, CALCIUM CHLORIDE 600; 310; 30; 20 MG/100ML; MG/100ML; MG/100ML; MG/100ML
INJECTION, SOLUTION INTRAVENOUS CONTINUOUS
Status: DISCONTINUED | OUTPATIENT
Start: 2022-04-11 | End: 2022-04-11

## 2022-04-11 RX ORDER — LIDOCAINE HYDROCHLORIDE 10 MG/ML
INJECTION, SOLUTION EPIDURAL; INFILTRATION; INTRACAUDAL; PERINEURAL PRN
Status: DISCONTINUED | OUTPATIENT
Start: 2022-04-11 | End: 2022-04-11 | Stop reason: SDUPTHER

## 2022-04-11 RX ORDER — SODIUM CHLORIDE 0.9 % (FLUSH) 0.9 %
5-40 SYRINGE (ML) INJECTION PRN
Status: DISCONTINUED | OUTPATIENT
Start: 2022-04-11 | End: 2022-04-11

## 2022-04-11 RX ORDER — SODIUM CHLORIDE 9 MG/ML
INJECTION, SOLUTION INTRAVENOUS PRN
Status: DISCONTINUED | OUTPATIENT
Start: 2022-04-11 | End: 2022-04-11

## 2022-04-11 RX ORDER — PROPOFOL 10 MG/ML
INJECTION, EMULSION INTRAVENOUS PRN
Status: DISCONTINUED | OUTPATIENT
Start: 2022-04-11 | End: 2022-04-11 | Stop reason: SDUPTHER

## 2022-04-11 RX ORDER — ONDANSETRON 2 MG/ML
4 INJECTION INTRAMUSCULAR; INTRAVENOUS
Status: ACTIVE | OUTPATIENT
Start: 2022-04-11 | End: 2022-04-11

## 2022-04-11 RX ORDER — QUETIAPINE FUMARATE 50 MG/1
100 TABLET, EXTENDED RELEASE ORAL 3 TIMES DAILY
Status: DISCONTINUED | OUTPATIENT
Start: 2022-04-11 | End: 2022-04-11

## 2022-04-11 RX ADMIN — PROPOFOL 60 MG: 10 INJECTION, EMULSION INTRAVENOUS at 12:07

## 2022-04-11 RX ADMIN — GABAPENTIN 100 MG: 100 CAPSULE ORAL at 14:18

## 2022-04-11 RX ADMIN — PROPOFOL 30 MG: 10 INJECTION, EMULSION INTRAVENOUS at 12:10

## 2022-04-11 RX ADMIN — PROPOFOL 60 MG: 10 INJECTION, EMULSION INTRAVENOUS at 12:11

## 2022-04-11 RX ADMIN — HYDROXYZINE HYDROCHLORIDE 50 MG: 50 TABLET, FILM COATED ORAL at 08:34

## 2022-04-11 RX ADMIN — IBUPROFEN 400 MG: 400 TABLET ORAL at 14:18

## 2022-04-11 RX ADMIN — TRAZODONE HYDROCHLORIDE 50 MG: 50 TABLET ORAL at 20:30

## 2022-04-11 RX ADMIN — LIDOCAINE HYDROCHLORIDE 30 MG: 10 INJECTION, SOLUTION EPIDURAL; INFILTRATION; INTRACAUDAL; PERINEURAL at 12:07

## 2022-04-11 RX ADMIN — GABAPENTIN 100 MG: 100 CAPSULE ORAL at 20:30

## 2022-04-11 RX ADMIN — HYDROXYZINE HYDROCHLORIDE 50 MG: 50 TABLET, FILM COATED ORAL at 20:30

## 2022-04-11 RX ADMIN — HYDROXYZINE HYDROCHLORIDE 50 MG: 50 TABLET, FILM COATED ORAL at 14:18

## 2022-04-11 RX ADMIN — QUETIAPINE FUMARATE 400 MG: 400 TABLET, EXTENDED RELEASE ORAL at 20:30

## 2022-04-11 RX ADMIN — PROPOFOL 60 MG: 10 INJECTION, EMULSION INTRAVENOUS at 12:14

## 2022-04-11 RX ADMIN — SODIUM CHLORIDE, POTASSIUM CHLORIDE, SODIUM LACTATE AND CALCIUM CHLORIDE: 600; 310; 30; 20 INJECTION, SOLUTION INTRAVENOUS at 11:53

## 2022-04-11 RX ADMIN — SUCRALFATE 1 G: 1 TABLET ORAL at 14:18

## 2022-04-11 RX ADMIN — AMLODIPINE BESYLATE 5 MG: 5 TABLET ORAL at 08:33

## 2022-04-11 RX ADMIN — SUCRALFATE 1 G: 1 TABLET ORAL at 17:56

## 2022-04-11 ASSESSMENT — PULMONARY FUNCTION TESTS
PIF_VALUE: 0

## 2022-04-11 ASSESSMENT — PAIN DESCRIPTION - PAIN TYPE: TYPE: ACUTE PAIN

## 2022-04-11 ASSESSMENT — PAIN SCALES - GENERAL
PAINLEVEL_OUTOF10: 0
PAINLEVEL_OUTOF10: 0
PAINLEVEL_OUTOF10: 7
PAINLEVEL_OUTOF10: 0
PAINLEVEL_OUTOF10: 8

## 2022-04-11 ASSESSMENT — ENCOUNTER SYMPTOMS
STRIDOR: 0
SHORTNESS OF BREATH: 0

## 2022-04-11 ASSESSMENT — LIFESTYLE VARIABLES: SMOKING_STATUS: 0

## 2022-04-11 ASSESSMENT — PAIN - FUNCTIONAL ASSESSMENT: PAIN_FUNCTIONAL_ASSESSMENT: 0-10

## 2022-04-11 ASSESSMENT — PAIN DESCRIPTION - LOCATION: LOCATION: BACK

## 2022-04-11 NOTE — ANESTHESIA PRE PROCEDURE
Department of Anesthesiology  Preprocedure Note       Name:  Davon Santo   Age:  45 y.o.  :  1970                                          MRN:  [id number]         Date:  2022      Surgeon: Michelle Avelar):  Tamir Petty MD    Procedure: Procedure(s):  EGD ESOPHAGOGASTRODUODENOSCOPY    Medications prior to admission:   Prior to Admission medications    Medication Sig Start Date End Date Taking? Authorizing Provider   gabapentin (NEURONTIN) 100 MG capsule Take 1 capsule by mouth 2 times daily as needed (foot pain) for up to 12 doses.  Intended supply: 30 days 3/28/22 3/31/22  Codie Khoury DO   ibuprofen (ADVIL;MOTRIN) 600 MG tablet Take 1 tablet by mouth every 6 hours as needed for Pain 3/27/22 4/3/22  Codie Khoury DO       Current medications:    Current Facility-Administered Medications   Medication Dose Route Frequency Provider Last Rate Last Admin    gabapentin (NEURONTIN) capsule 100 mg  100 mg Oral TID Ave Bhandari MD        QUEtiapine (SEROQUEL XR) extended release tablet 400 mg  400 mg Oral Nightly Ave Bhandari MD        lactated ringers infusion   IntraVENous Continuous Suly De Oliveira  mL/hr at 22 1153 New Bag at 22 1153    amLODIPine (NORVASC) tablet 5 mg  5 mg Oral Daily Kiera Maldonado MD   5 mg at 22 0833    acetaminophen (TYLENOL) tablet 650 mg  650 mg Oral Q4H PRN Ave Bhandari MD   650 mg at 04/10/22 1216    aluminum & magnesium hydroxide-simethicone (MAALOX) 200-200-20 MG/5ML suspension 30 mL  30 mL Oral Q6H PRN Ave Bhandari MD        hydrOXYzine (ATARAX) tablet 50 mg  50 mg Oral TID PRN Ave Bhandari MD   50 mg at 22 0834    ibuprofen (ADVIL;MOTRIN) tablet 400 mg  400 mg Oral Q6H PRN Ave Bhandari MD   400 mg at 04/10/22 0741    polyethylene glycol (GLYCOLAX) packet 17 g  17 g Oral Daily PRN Ave Bhandari MD        traZODone (DESYREL) tablet 50 mg  50 mg Oral Nightly PRN Ave Bhandari MD   50 mg at 04/10/22 2035 Allergies: Allergies   Allergen Reactions    Pcn [Penicillins]        Problem List:    Patient Active Problem List   Diagnosis Code    Depression with suicidal ideation F32. A, R45.851    Cocaine abuse ([facility] [address] F14.10    Cannabis abuse F12.10    Bipolar I disorder, most recent episode depressed, severe without psychotic features ([facility] [address] F31.4    [facility]cal[facility] E43       Past Medical History:        Diagnosis Date    Psychiatric problem        Past Surgical History:  History reviewed. No pertinent surgical history. Social History:    Social History     Tobacco Use    Smoking status: Current Every Day Smoker     Packs/day: 1.00     Types: Cigarettes    Smokeless tobacco: Never Used   Substance Use Topics    Alcohol use: Not Currently                                Ready to quit: Not Answered  Counseling given: Not Answered      Vital Signs (Current):   Vitals:    04/09/22 2000 04/10/22 0812 04/10/22 2009 04/11/22 1136   BP: (!) 151/88 (!) 144/103 (!) 136/94 115/82   Pulse: 72 90 90 78   Resp: 14 14 14 18   Temp: 98.1 °F (36.7 °C) 98.1 °F (36.7 °C) 98.1 °F (36.7 °C) 97.1 °F (36.2 °C)   TempSrc: Oral Oral Oral Infrared   SpO2:    98%   Weight:       Height:                                                  BP Readings from Last 3 Encounters:   04/11/22 115/82   04/09/22 139/86   03/27/22 (!) 135/96       NPO Status: Time of last liquid consumption: 1700                        Time of last solid consumption: 1700                        Date of last liquid consumption: 04/10/22                        Date of last solid food consumption: 04/10/22    BMI:   Wt Readings from Last 3 Encounters:   04/09/22 120 lb (54.4 kg)   04/08/22 120 lb (54.4 kg)     Body mass index is 17.72 kg/m².     CBC:   Lab Results   Component Value Date    WBC 4.8 04/08/2022    RBC 3.66 04/08/2022    HGB 12.4 04/08/2022    HCT 36.5 04/08/2022    MCV 99.7 04/08/2022    RDW 13.2 04/08/2022     04/08/2022 HB 12.4    CMP:   Lab Results   Component Value Date     04/08/2022    K 4.0 04/08/2022     04/08/2022    CO2 24 04/08/2022    BUN 20 04/08/2022    CREATININE 1.00 04/08/2022    GFRAA >60 04/08/2022    LABGLOM 58 04/08/2022    GLUCOSE 96 04/08/2022    PROT 6.2 04/08/2022    CALCIUM 8.9 04/08/2022    BILITOT 0.43 04/08/2022    ALKPHOS 114 04/08/2022    AST 16 04/08/2022    ALT 15 04/08/2022       POC Tests: No results for input(s): POCGLU, POCNA, POCK, POCCL, POCBUN, POCHEMO, POCHCT in the last 72 hours. Coags: No results found for: PROTIME, INR, APTT    HCG (If Applicable): No results found for: PREGTESTUR, PREGSERUM, HCG, HCGQUANT     ABGs: No results found for: PHART, PO2ART, ZGB5AJR, NSV6NDK, BEART, D1MOVNOX     Type & Screen (If Applicable):  No results found for: LABABO, LABRH    Drug/Infectious Status (If Applicable):  Lab Results   Component Value Date    HEPCAB REACTIVE 03/05/2021       COVID-19 Screening (If Applicable):   Lab Results   Component Value Date    COVID19 Not Detected 04/08/2022           Anesthesia Evaluation  Patient summary reviewed and Nursing notes reviewed no history of anesthetic complications:   Airway: Mallampati: I  TM distance: >3 FB   Neck ROM: full  Mouth opening: > = 3 FB Dental:    (+) edentulous      Pulmonary:Negative Pulmonary ROS and normal exam  breath sounds clear to auscultation      (-) pneumonia, COPD, asthma, shortness of breath, recent URI, sleep apnea, rhonchi, wheezes, rales, stridor, not a current smoker and no decreased breath sounds          Patient did not smoke on day of surgery.                  Cardiovascular:Negative CV ROS  Exercise tolerance: good (>4 METS),       (-) pacemaker, hypertension, valvular problems/murmurs, past MI, CAD, CABG/stent, dysrhythmias,  angina,  CHF, orthopnea, PND,  FERGUSON, murmur, weak pulses,  friction rub, systolic click, carotid bruit,  JVD, peripheral edema, no pulmonary hypertension and no hyperlipidemia    ECG reviewed  Rhythm: regular  Rate: normal           Beta Blocker:  Not on Beta Blocker         Neuro/Psych:   (+) psychiatric history: stable without treatmentdepression/anxiety    (-) seizures, neuromuscular disease, TIA, CVA and headaches           GI/Hepatic/Renal: Neg GI/Hepatic/Renal ROS       (-) hiatal hernia, GERD, PUD, hepatitis, liver disease, no renal disease, bowel prep and no morbid obesity       Endo/Other: Negative Endo/Other ROS   (+) no malignancy/cancer. (-) diabetes mellitus, hypothyroidism, hyperthyroidism, blood dyscrasia, arthritis, no electrolyte abnormalities, no malignancy/cancer               Abdominal:             Vascular: negative vascular ROS. - PVD, DVT and PE. Other Findings:           Anesthesia Plan      general     ASA 2       Induction: intravenous. MIPS: Postoperative opioids intended and Prophylactic antiemetics administered. Anesthetic plan and risks discussed with patient. Plan discussed with CRNA.                   Ana Lilia Sotomayor MD   4/11/2022

## 2022-04-11 NOTE — ANESTHESIA POSTPROCEDURE EVALUATION
POST- ANESTHESIA EVALUATION       Pt Name: María Lazcano  MRN: [id number]  YOB: 1970  Date of evaluation: 4/11/2022  Time:  1:26 PM      /89   Pulse 90   Temp 98.3 °F (36.8 °C) (Oral)   Resp 12   Ht 5' 9\" (1.753 m)   Wt 120 lb (54.4 kg)   SpO2 94%   BMI 17.72 kg/m²      Consciousness Level  Awake  Cardiopulmonary Status  Stable  Pain Adequately Treated YES  Nausea / Vomiting  NO  Adequate Hydration  YES  Anesthesia Related Complications NONE      Electronically signed by Rosa Kennedy MD on 4/11/2022 at 1:26 PM       Department of Anesthesiology  Postprocedure Note    Patient: María Lazcano  MRN: [id number]  YOB: 1970  Date of evaluation: 4/11/2022  Time:  1:26 PM     Procedure Summary     Date: 04/11/22 Room / Location: [address] [place] [address] 01 / [facility] ENDO    Anesthesia Start: 1205 Anesthesia Stop: 1226    Procedure: EGD BIOPSY (N/A Esophagus) Diagnosis: (DYSPHAGIA   [facility]    Surgeons: Ana Wakefield MD Responsible Provider: Rosa Kennedy MD    Anesthesia Type: general ASA Status: 2          Anesthesia Type: general    Bert Phase I: Bert Score: 10    Bert Phase II:      Last vitals: Reviewed and per EMR flowsheets.        Anesthesia Post Evaluation

## 2022-04-11 NOTE — PLAN OF CARE
Problem: Altered Mood, Depressive Behavior:  Goal: Ability to disclose and discuss suicidal ideas will improve  Description: Ability to disclose and discuss suicidal ideas will improve  4/10/2022 2344 by Kaitlin Elaine LPN  Outcome: Ongoing   Denies suicidal thoughts and remains free from harm at this time. Reports continued depression, is out to the day room. Accepting of medications.

## 2022-04-11 NOTE — PLAN OF CARE
Problem: Altered Mood, Depressive Behavior:  Goal: Ability to disclose and discuss suicidal ideas will improve  Description: Ability to disclose and discuss suicidal ideas will improve  4/11/2022 1001 by Fam Hyde RN  Outcome: Ongoing     Problem: Altered Mood, Depressive Behavior:  Goal: Able to verbalize support systems  Description: Able to verbalize support systems  Outcome: Ongoing     Problem: Tobacco Use:  Goal: Inpatient tobacco use cessation counseling participation  Description: Inpatient tobacco use cessation counseling participation  Outcome: Ongoing     Problem: Pain:  Goal: Pain level will decrease  Description: Pain level will decrease  Outcome: Ongoing     Problem: Pain:  Goal: Control of acute pain  Description: Control of acute pain  Outcome: Ongoing     Problem: Pain:  Goal: Control of chronic pain  Description: Control of chronic pain  Outcome: Ongoing     Problem: Nutrition  Goal: Optimal nutrition therapy  Outcome: Ongoing   Patient encouraged to eat 50% or more of food tray and snacks; Patient denies acute/chronic pain: encouraged to use non-pharmacological pain mediation; Patient verbalizes support systems; Patient cooperative upon approach, answered all assessments/questions: is able to disclose/discuss suicidal ideation: Patient denies suicidal ideation at this time. Patient given tobacco quitline number 44552620953 at this time, refusing to call at this time, states \" I just dont want to quit now\"- patient given information as to the dangers of long term tobacco use. Continue to reinforce the importance of tobacco cessation.

## 2022-04-11 NOTE — OP NOTE
PROCEDURE NOTE    DATE OF PROCEDURE: 4/11/2022     SURGEON: Michael Ibanez MD  Facility: [facility]  ASSISTANT: None  Anesthesia: MAC  PREOPERATIVE DIAGNOSIS:   Dysphagia  Weight loss    Diagnosis:  Patient had distal esophageal ulceration with white thick discharge most likely Candida esophagitis biopsies were taken    Small hiatal hernia    Gastritis with scattered erosions in the body and the antrum of the stomach biopsies were taken    Significant duodenitis in the duodenal bulb with edema and swelling biopsies were taken      POSTOPERATIVE DIAGNOSIS: As described below    OPERATION: Upper GI endoscopy with Biopsy    ANESTHESIA: Moderate Sedation     ESTIMATED BLOOD LOSS: Less than 50 ml    COMPLICATIONS: None. SPECIMENS:  Was Obtained:     Patient had distal esophageal ulceration with white thick discharge most likely Candida esophagitis biopsies were taken    Small hiatal hernia    Gastritis with scattered erosions in the body and the antrum of the stomach biopsies were taken    Significant duodenitis in the duodenal bulb with edema and swelling biopsies were taken        HISTORY: The patient is a 45y.o. year old female with history of above preop diagnosis. I recommended esophagogastroduodenoscopy with possible biopsy and I explained the risk, benefits, expected outcome, and alternatives to the procedure. Risks included but are not limited to bleeding, infection, respiratory distress, hypotension, and perforation of the esophagus, stomach, or duodenum. Patient understands and is in agreement. The patient was counseled at length about the risks of leandro Covid-19 during their perioperative period and any recovery window from their procedure. The patient was made aware that leandro Covid-19  may worsen their prognosis for recovering from their procedure  and lend to a higher morbidity and/or mortality risk.   All material risks, benefits, and reasonable alternatives including postponing the procedure were discussed. The patient does wish to proceed with the procedure at this time. PROCEDURE: The patient was given IV conscious sedation. The patient's SPO2 remained above 90% throughout the procedure. The gastroscope was inserted orally and advanced under direct vision through the esophagus, through the stomach, through the pylorus, and into the descending duodenum. Post sedation note : The patient's SPO2 remained above 90% throughout the procedure. the vital signs remained stable , and no immediate complication form the procedure noted, patient will be ready for d/c when criteria is met . Findings:    Retropharyngeal area was grossly normal appearing    Esophagus: abnormal: Patient had distal esophageal ulceration with white thick discharge most likely Candida esophagitis biopsies were taken    Small hiatal hernia      Stomach:  Gastritis with scattered erosions in the body and the antrum of the stomach biopsies were taken          Duodenum:   Significant duodenitis in the duodenal bulb with edema and swelling biopsies were taken        The scope was removed and the patient tolerated the procedure well. Recommendations/Plan:   1. F/U Biopsies  2. PPI and Carafate  3.  Outpatient follow-up for colonoscopy and follow-up on the esophageal fine    Electronically signed by Edmond Sandhu MD  on 4/11/2022 at 12:20 PM

## 2022-04-11 NOTE — GROUP NOTE
Group Therapy Note    Date: 4/11/2022    Group Start Time: 1105  Group End Time: 4373  Group Topic: Music Therapy    DENG Minaya        Group Therapy Note    Pt did not attend music therapy group d/t resting in room despite staff invitation to attend. 1:1 talk time offered as alternative to group session, pt declined.

## 2022-04-11 NOTE — PROGRESS NOTES
MERVIN CAS[facility] Internal Medicine  Heidi Perez MD; Rosangela Drake MD; Salbador Kearns MD; MD Ciara Reyes MD; MD MELANIE Archer [facility] Internal Medicine   [facility]              Date:   4/11/2022  Patient name:  Elizabeth Barcenas  Date of admission:  4/9/2022  3:35 AM  MRN:   [id number]  Account:  [de-identified]  YOB: 1970  PCP:    Hector Dailey  Room:   0208/0208-02  Code Status:    Full Code    Chief Complaint:     No chief complaint on file.  htn  Wt loss      History Obtained From:   Patient  Medical record nursing staff    History of Present Illness:   Principal Problem:    Bipolar I disorder, most recent episode depressed, severe without psychotic features ([address]  Active Problems:    Cocaine abuse ([address]    Cannabis abuse    Severe protein-calorie malnutrition ([address]    Failure to thrive in adult    Adult body mass index less than 19    Other dysphagia  Resolved Problems:    * No resolved hospital problems. *      Elizabeth Barcenas is a 45 y.o. Non- / non  female who presents with No chief complaint on file. and is admitted to the hospital for the management of Bipolar I disorder, most recent episode depressed, severe without psychotic features ([address] 29-year-old lady with a severe protein calorie malnutrition BMI 17 has trouble swallowing and sometimes pain for last few months denies any outpatient work-up        Past Medical History:     Past Medical History:   Diagnosis Date    Psychiatric problem         Past Surgical History:     History reviewed. No pertinent surgical history. Medications Prior to Admission:     Prior to Admission medications    Medication Sig Start Date End Date Taking? Authorizing Provider   gabapentin (NEURONTIN) 100 MG capsule Take 1 capsule by mouth 2 times daily as needed (foot pain) for up to 12 doses.  Intended supply: 30 days 3/28/22 3/31/22  Diane Gunn DO ibuprofen (ADVIL;MOTRIN) 600 MG tablet Take 1 tablet by mouth every 6 hours as needed for Pain 3/27/22 4/3/22  Shon Miller DO        Allergies:     Pcn [penicillins]    Social History:     Tobacco:    reports that she has been smoking cigarettes. She has been smoking about 1.00 pack per day. She has never used smokeless tobacco.  Alcohol:      reports previous alcohol use. Drug Use:  reports previous drug use. Family History:     History reviewed. No pertinent family history. Review of Systems:     Positive and Negative as described in HPI. CONSTITUTIONAL:  negative for fevers, chills, sweats, fatigue, weight loss  HEENT:  negative for vision, hearing changes, runny nose, throat pain  RESPIRATORY:  negative for shortness of breath, cough, congestion, wheezing  CARDIOVASCULAR:  negative for chest pain, palpitations  GASTROINTESTINAL: Dysphagia odynophagia  GENITOURINARY:  negative for difficulty of urination, burning with urination, frequency   INTEGUMENT:  negative for rash, skin lesions, easy bruising   HEMATOLOGIC/LYMPHATIC:  negative for swelling/edema   ALLERGIC/IMMUNOLOGIC:  negative for urticaria , itching  ENDOCRINE:  negative increase in drinking, increase in urination, hot or cold intolerance  MUSCULOSKELETAL:  negative joint pains, muscle aches, swelling of joints  NEUROLOGICAL:  negative for headaches, dizziness, lightheadedness, numbness, pain, tingling extremities      Physical Exam:     Vitals:    04/11/22 1222 04/11/22 1232 04/11/22 1240 04/11/22 1250   BP: (!) 137/95 (!) 111/91 108/77 109/83   Pulse: 82 85 84 76   Resp: 17 15 18 14   Temp: 98.7 °F (37.1 °C)   98.7 °F (37.1 °C)   TempSrc: Infrared      SpO2: 95% 94% 93% 94%   Weight:       Height:         Physical Exam   Vitals:  /83   Pulse 76   Temp 98.7 °F (37.1 °C)   Resp 14   Ht 5' 9\" (1.753 m)   Wt 120 lb (54.4 kg)   SpO2 94%   BMI 17.72 kg/m²                 Body mass index is 17.72 kg/m².      General Appearance:   Alert , CO-OPERATIVE ,                 Skin:                             No rash or erythema  HEENT ;                                 Neck:                            No mass , no thyroid enlargement                                           Pulmonary/Chest:        Clear to auscultation bilaterally . No wheezes, rales or rhonchi . No abnormality on percussion                                                        Cardiovascular:            Normal rate, regular rhythm,                                          No murmur or  Gallop . Abdomen:                       Soft, non-tender                                           Normal bowels sounds,                                             Extremities:                    No  Edema . Neurological ;                 No focal motor deficit ,                 No focal sensory deficit ,    Musculo-skeletal ;                  No  gait abnormality                  No significant joint abnormality,                   Psych:   Mood normal ,                 Memory intact ,                             cachectic , BMI 17.77                                                              . Investigations:      Laboratory Testing:  Recent Results (from the past 24 hour(s))   TSH with Reflex    Collection Time: 04/11/22  9:50 AM   Result Value Ref Range    TSH 2.83 0.30 - 5.00 uIU/mL       Imaging/Diagnostics:  No results found.     Assessment :      Hospital Problems           Last Modified POA    * (Principal) Bipolar I disorder, most recent episode depressed, severe without psychotic features ([address] 4/9/2022 Yes    Cocaine abuse ([address] 4/9/2022 Yes    Cannabis abuse 4/9/2022 Yes    Severe protein-calorie malnutrition ([address] 4/10/2022 Yes    Failure to thrive in adult 4/11/2022 Yes    Adult body mass index less than 19 4/11/2022 Yes    Other dysphagia 4/11/2022 Yes          Plan:     70-year-old lady with a severe protein calorie malnutrition few month history of dysphagia and odynophagia has not had any outpatient consultation  Will request GI for EGD  Barium swallow in the morning  Hypertension start Norvasc 5 mg    4/11/22    · BP low normal  · Discontinue amlodipine 1. Will monitor       . GI INPUT noted   Dysphagia to solid  Significant weight loss and malnutrition with BMI of 17  No significant acid reflux symptoms  Slight elevation of the alkaline phosphatase  NSAIDs use     Recommendations:   EGD t  PPI  Repeat alkaline phosphatase  DC NSAIDs           Sunshine Lira MD  4/11/2022  1:08 PM    Copy sent to Dr. Paula Mack    Please note that this chart was generated using voice recognition Dragon dictation software. Although every effort was made to ensure the accuracy of this automated transcription, some errors in transcription may have occurred.

## 2022-04-11 NOTE — GROUP NOTE
Group Therapy Note    Date: 4/11/2022    Group Start Time: 1007  Group End Time: 1996  Group Topic: Psychotherapy    STCZ I EMILY Connell, LSW        Group Therapy Note    Attendees: 9/17       Patient was offered group therapy today but declined to participate despite encouragement from staff. 1:1 was offered.         Signature:  EMILY Herrera, LSW

## 2022-04-11 NOTE — PROGRESS NOTES
Daily Progress Note  4/11/2022    Patient Name: Eddie Harding:  Depression with suicidal ideation with plan and intent to use a knife to cut self          SUBJECTIVE:   Patient was seen for follow-up assessment today. Nursing staff report patient completed EGD biopsy with Dr. Irene Cornejo without incident today. Patient has been medication adherent and behaviorally in control. She continues to endorse depression and anxiety to staff. Patient was approached in the day area and was agreeable to conversation in the privacy of her assigned room. Patient was less irritable than when she was first admitted and cooperative with assessment. She continues to present as very depressed and anxious. Patient describes her mood today as \"back-and-forth\" and states that she will be desponded one moment and then feeling okay the next. During assessment she described herself as feeling \"very depressed\" and continues to have thoughts of suicide. She expressed some concern with inflammation found in her esophagus and stomach during procedure today and is hopeful that medication will help. She stated \"well I am homeless and I will go a long time without eating and the acid comes up and then I try to swallow something and it just feels stuck\". She continues to endorse wanting to be discharged to inpatient AOD and mentions [facility]. She states that paperwork has been submitted and she has to contact them for phone assessment. She reports ongoing difficulty falling asleep and staying asleep and is hopeful that recent titration of Seroquel will be beneficial.  Patient denies auditory and visual hallucinations and makes no delusional or paranoid statements during conversation. At this time patient is unable to contract for safety in the community and warrants further hospitalization for stabilization, medication management, and therapeutic groups and milieu.     Compliant with scheduled medications: [x] Yes    [] No  Seroquel    Received emergency medications in past 24 hrs: [] Yes   [x] No    Appetite:  [] Normal/Adequate/Unchanged  [] Increased  [x] Decreased      Sleep:       [] Normal/Adequate/Unchanged  [] Fair  [x] Poor      Group Attendance on Unit:   [] Yes  [x] Selectively    [] No    Medication Side Effects: Denies         Mental Status Exam  Level of consciousness: Alert and awake   Appearance: Appropriate attire for setting, seated on bed, with fair  grooming and hygiene   Behavior/Motor: Approachable, restless  Attitude toward examiner: Cooperative, attentive, fair eye contact    Speech: Anxious and depressed tone, normal volume, normal rate   Mood: Depressed  Affect: Mood congruent  Thought processes: Goal directed, linear   Thought content: Denies homicidal ideation  Suicidal Ideation: Endorses suicidal ideation; unable to contract for safety in community   Delusions: Not evident  Perceptual Disturbance: patient is not observed responding to internal stimuli  Cognition: Oriented to self, location, time, and situation  Memory: Intact  Insight & Judgement: Poor     Data   height is 5' 9\" (1.753 m) and weight is 120 lb (54.4 kg). Her oral temperature is 98.5 °F (36.9 °C). Her blood pressure is 116/82 and her pulse is 88. Her respiration is 12 and oxygen saturation is 94%. Labs:   Admission on 04/09/2022   Component Date Value Ref Range Status    Lithium Lvl 04/09/2022 <0.1* 0.6 - 1.2 mmol/L Final    Lithium Dose Amount 04/09/2022 reschedule   Final    Lithium Date Last Dose 04/09/2022 rescheduled   Final    Lithium Dose Time 04/09/2022 reschedule   Final    TSH 04/11/2022 2.83  0.30 - 5.00 uIU/mL Final         Reviewed patient's current plan of care and vital signs with nursing staff.     Labs reviewed: [x] Yes  Last EKG in EMR reviewed: [x] Yes    Medications  Current Facility-Administered Medications: gabapentin (NEURONTIN) capsule 100 mg, 100 mg, Oral, TID  QUEtiapine (SEROQUEL XR) extended release tablet 400 mg, 400 mg, Oral, Nightly  lactated ringers infusion, , IntraVENous, Continuous  sodium chloride flush 0.9 % injection 5-40 mL, 5-40 mL, IntraVENous, 2 times per day  sodium chloride flush 0.9 % injection 5-40 mL, 5-40 mL, IntraVENous, PRN  0.9 % sodium chloride infusion, , IntraVENous, PRN  meperidine (DEMEROL) injection 12.5 mg, 12.5 mg, IntraVENous, Q5 Min PRN  HYDROmorphone (DILAUDID) injection 0.5 mg, 0.5 mg, IntraVENous, Q5 Min PRN  ondansetron (ZOFRAN) injection 4 mg, 4 mg, IntraVENous, Once PRN  diphenhydrAMINE (BENADRYL) injection 12.5 mg, 12.5 mg, IntraVENous, Once PRN  [START ON 4/12/2022] pantoprazole (PROTONIX) tablet 40 mg, 40 mg, Oral, QAM AC  sucralfate (CARAFATE) tablet 1 g, 1 g, Oral, 4 times per day  amLODIPine (NORVASC) tablet 5 mg, 5 mg, Oral, Daily  acetaminophen (TYLENOL) tablet 650 mg, 650 mg, Oral, Q4H PRN  aluminum & magnesium hydroxide-simethicone (MAALOX) 200-200-20 MG/5ML suspension 30 mL, 30 mL, Oral, Q6H PRN  hydrOXYzine (ATARAX) tablet 50 mg, 50 mg, Oral, TID PRN  ibuprofen (ADVIL;MOTRIN) tablet 400 mg, 400 mg, Oral, Q6H PRN  polyethylene glycol (GLYCOLAX) packet 17 g, 17 g, Oral, Daily PRN  traZODone (DESYREL) tablet 50 mg, 50 mg, Oral, Nightly PRN    ASSESSMENT  Bipolar I disorder, most recent episode depressed, severe without psychotic features ([facility] [address]         HANDOFF  Patient symptoms: No change today  Medications as determined by attending physician  Encourage participation in groups and milieu. Probable discharge is to be determined by MD     Electronically signed by HENRIETTA Castillo CNP on 4/11/2022 at 3:07 PM     **This report has been created using voice recognition software. It may contain minor errors which are inherent in voice recognition technology. **    I independently saw and evaluated the patient. I reviewed the nurse practioner's documentation above.   Any additional comments or changes to the    documentation are stated below otherwise agree with assessment. The patient was admitted to psychiatry after being brought to the hospital by a friend. Emerita Vivas had been in her garage threatening to end her life with a knife.  On the unit the patient has been irritable.  The patient was started on Seroquel at nighttime.  She has been compliant with her medications.  The patient has been seen by internal medicine and also by gastroenterology. Carrie Mendes patient is scheduled for EGD today due to concerns about dysphagia to solids and significant weight loss leading to a BMI of 17 currently.  She also has slight elevation of her ALP. The patient reports ongoing anxiety and depressive symptoms. Emerita Vivas has not been able to sleep well at night.  She states that the Seroquel is not helping with her depression or with her sleep though she does find it somewhat sedating. Emerita Vivas is on the phone with the Social Security office. Robin Felton are making her weight. Emerita Vivas is irritable. Emerita Vivas states that her anxiety is bad and her pain is worse because her gabapentin was discontinued.  I have reviewed the PDMP.  We will start the patient on her gabapentin 100 mg 3 times daily which is confirmed from the PDMP.  The dose of her Seroquel XR will be increased to 400 mg at nighttime. PLAN  Medications as noted above  Attempt to develop insight  Psycho-education conducted. Supportive Therapy conducted.   Probable discharge is 2-5 days  Follow-up daily while on inpatient unit    Electronically signed by Colt Vasquez MD on 4/11/22 at 7:44 PM EDT

## 2022-04-12 PROCEDURE — 6370000000 HC RX 637 (ALT 250 FOR IP): Performed by: PSYCHIATRY & NEUROLOGY

## 2022-04-12 PROCEDURE — APPSS30 APP SPLIT SHARED TIME 16-30 MINUTES

## 2022-04-12 PROCEDURE — 6370000000 HC RX 637 (ALT 250 FOR IP): Performed by: INTERNAL MEDICINE

## 2022-04-12 PROCEDURE — 99231 SBSQ HOSP IP/OBS SF/LOW 25: CPT | Performed by: INTERNAL MEDICINE

## 2022-04-12 PROCEDURE — 1240000000 HC EMOTIONAL WELLNESS R&B

## 2022-04-12 PROCEDURE — 99232 SBSQ HOSP IP/OBS MODERATE 35: CPT | Performed by: PSYCHIATRY & NEUROLOGY

## 2022-04-12 RX ORDER — DIVALPROEX SODIUM 500 MG/1
500 TABLET, EXTENDED RELEASE ORAL NIGHTLY
Status: DISCONTINUED | OUTPATIENT
Start: 2022-04-12 | End: 2022-04-13

## 2022-04-12 RX ORDER — QUETIAPINE FUMARATE 200 MG/1
400 TABLET, FILM COATED ORAL NIGHTLY
Status: DISCONTINUED | OUTPATIENT
Start: 2022-04-12 | End: 2022-04-15 | Stop reason: HOSPADM

## 2022-04-12 RX ADMIN — HYDROXYZINE HYDROCHLORIDE 50 MG: 50 TABLET, FILM COATED ORAL at 20:39

## 2022-04-12 RX ADMIN — PANTOPRAZOLE SODIUM 40 MG: 40 TABLET, DELAYED RELEASE ORAL at 08:26

## 2022-04-12 RX ADMIN — IBUPROFEN 400 MG: 400 TABLET ORAL at 03:34

## 2022-04-12 RX ADMIN — QUETIAPINE FUMARATE 400 MG: 200 TABLET ORAL at 20:39

## 2022-04-12 RX ADMIN — AMLODIPINE BESYLATE 5 MG: 5 TABLET ORAL at 08:26

## 2022-04-12 RX ADMIN — DIVALPROEX SODIUM 500 MG: 500 TABLET, EXTENDED RELEASE ORAL at 20:39

## 2022-04-12 RX ADMIN — GABAPENTIN 100 MG: 100 CAPSULE ORAL at 08:26

## 2022-04-12 RX ADMIN — TRAZODONE HYDROCHLORIDE 50 MG: 50 TABLET ORAL at 20:39

## 2022-04-12 RX ADMIN — GABAPENTIN 100 MG: 100 CAPSULE ORAL at 14:14

## 2022-04-12 RX ADMIN — IBUPROFEN 400 MG: 400 TABLET ORAL at 20:39

## 2022-04-12 RX ADMIN — HYDROXYZINE HYDROCHLORIDE 50 MG: 50 TABLET, FILM COATED ORAL at 11:59

## 2022-04-12 RX ADMIN — GABAPENTIN 100 MG: 100 CAPSULE ORAL at 20:39

## 2022-04-12 RX ADMIN — SUCRALFATE 1 G: 1 TABLET ORAL at 11:59

## 2022-04-12 RX ADMIN — HYDROXYZINE HYDROCHLORIDE 50 MG: 50 TABLET, FILM COATED ORAL at 03:59

## 2022-04-12 RX ADMIN — SUCRALFATE 1 G: 1 TABLET ORAL at 19:02

## 2022-04-12 ASSESSMENT — PAIN SCALES - GENERAL
PAINLEVEL_OUTOF10: 8
PAINLEVEL_OUTOF10: 6
PAINLEVEL_OUTOF10: 7

## 2022-04-12 ASSESSMENT — PAIN SCALES - WONG BAKER: WONGBAKER_NUMERICALRESPONSE: 8

## 2022-04-12 ASSESSMENT — PAIN DESCRIPTION - ORIENTATION: ORIENTATION: LOWER

## 2022-04-12 ASSESSMENT — PAIN DESCRIPTION - PAIN TYPE: TYPE: ACUTE PAIN

## 2022-04-12 ASSESSMENT — PAIN DESCRIPTION - LOCATION: LOCATION: BACK

## 2022-04-12 NOTE — PROGRESS NOTES
[facility] Internal Medicine  Zaki Umana MD; Tracy Guerra MD; Tarah Parra MD; MD Wilian Rajput Caro, MD; MD MELANIE Mcnair [facility] Internal Medicine   [facility]              Date:   4/12/2022  Patient name:  Suad Tapia  Date of admission:  4/9/2022  3:35 AM  MRN:   [id number]  Account:  [de-identified]  YOB: 1970  PCP:    Torsten Del Rio  Room:   [address] [place] [address]  Code Status:    Full Code    Chief Complaint:     No chief complaint on file.  htn  Wt loss      History Obtained From:   Patient  Medical record nursing staff    History of Present Illness:   Principal Problem:    Bipolar I disorder, most recent episode depressed, severe without psychotic features ([address]  Active Problems:    Cocaine abuse ([address]    Cannabis abuse    Severe protein-calorie malnutrition ([address]    Failure to thrive in adult    Adult body mass index less than 19    Other dysphagia  Resolved Problems:    * No resolved hospital problems. *      Suad Tapia is a 45 y.o. Non- / non  female who presents with No chief complaint on file. and is admitted to the hospital for the management of Bipolar I disorder, most recent episode depressed, severe without psychotic features ([address] 59-year-old lady with a severe protein calorie malnutrition BMI 17 has trouble swallowing and sometimes pain for last few months denies any outpatient work-up        Past Medical History:     Past Medical History:   Diagnosis Date    Psychiatric problem         Past Surgical History:     Past Surgical History:   Procedure Laterality Date    UPPER GASTROINTESTINAL ENDOSCOPY N/A 4/11/2022    EGD BIOPSY performed by Anurag Elise MD at [facility]        Medications Prior to Admission:     Prior to Admission medications    Medication Sig Start Date End Date Taking?  Authorizing Provider   gabapentin (NEURONTIN) 100 MG capsule Take 1 capsule by mouth 2 times daily as needed (foot pain) for up to 12 doses. Intended supply: 30 days 3/28/22 3/31/22  Miriambenito MonroeDO barry   ibuprofen (ADVIL;MOTRIN) 600 MG tablet Take 1 tablet by mouth every 6 hours as needed for Pain 3/27/22 4/3/22  Miriamsymonealfonzo Marquez DO        Allergies:     Pcn [penicillins]    Social History:     Tobacco:    reports that she has been smoking cigarettes. She has been smoking about 1.00 pack per day. She has never used smokeless tobacco.  Alcohol:      reports previous alcohol use. Drug Use:  reports previous drug use. Family History:     History reviewed. No pertinent family history. Review of Systems:     Positive and Negative as described in HPI.     CONSTITUTIONAL:  negative for fevers, chills, sweats, fatigue, weight loss  HEENT:  negative for vision, hearing changes, runny nose, throat pain  RESPIRATORY:  negative for shortness of breath, cough, congestion, wheezing  CARDIOVASCULAR:  negative for chest pain, palpitations  GASTROINTESTINAL: Dysphagia odynophagia  GENITOURINARY:  negative for difficulty of urination, burning with urination, frequency   INTEGUMENT:  negative for rash, skin lesions, easy bruising   HEMATOLOGIC/LYMPHATIC:  negative for swelling/edema   ALLERGIC/IMMUNOLOGIC:  negative for urticaria , itching  ENDOCRINE:  negative increase in drinking, increase in urination, hot or cold intolerance  MUSCULOSKELETAL:  negative joint pains, muscle aches, swelling of joints  NEUROLOGICAL:  negative for headaches, dizziness, lightheadedness, numbness, pain, tingling extremities      Physical Exam:     Vitals:    04/11/22 1315 04/11/22 1418 04/11/22 1945 04/12/22 0800   BP: 118/89 116/82 113/76 109/80   Pulse: 90 88 83 87   Resp: 12 12 14 14   Temp: 98.3 °F (36.8 °C) 98.5 °F (36.9 °C) 97.7 °F (36.5 °C) 98 °F (36.7 °C)   TempSrc: Oral Oral Oral Oral   SpO2:       Weight:       Height:         Physical Exam   Vitals:  /80   Pulse 87   Temp 98 °F (36.7 °C) (Oral)   Resp 14   Ht 5' 9\" (1.753 m)   Wt 120 lb (54.4 kg)   SpO2 94%   BMI 17.72 kg/m²                 Body mass index is 17.72 kg/m². General Appearance:   Alert , CO-OPERATIVE ,                 Skin:                             No rash or erythema  HEENT ;                                 Neck:                            No mass , no thyroid enlargement                                           Pulmonary/Chest:        Clear to auscultation bilaterally . No wheezes, rales or rhonchi . No abnormality on percussion                                                        Cardiovascular:            Normal rate, regular rhythm,                                          No murmur or  Gallop . Abdomen:                       Soft, non-tender                                           Normal bowels sounds,                                             Extremities:                    No  Edema . Neurological ;                 No focal motor deficit ,                 No focal sensory deficit ,    Musculo-skeletal ;                  No  gait abnormality                  No significant joint abnormality,                   Psych:   Mood normal ,                 Memory intact ,                             cachectic , BMI 17.77                                                              . Investigations:      Laboratory Testing:  No results found for this or any previous visit (from the past 24 hour(s)). Imaging/Diagnostics:  No results found.     Assessment :      Hospital Problems           Last Modified POA    * (Principal) Bipolar I disorder, most recent episode depressed, severe without psychotic features ([address] 4/9/2022 Yes    Cocaine abuse ([address] 4/9/2022 Yes    Cannabis abuse 4/9/2022 Yes    Severe protein-calorie malnutrition ([address] 4/10/2022 Yes    Failure to thrive in adult 4/11/2022 Yes    Adult body mass index less than 19 4/11/2022 Yes    Other dysphagia 4/11/2022 Yes          Plan:     50-year-old lady with a severe protein calorie malnutrition few month history of dysphagia and odynophagia has not had any outpatient consultation  Will request GI for EGD  Barium swallow in the morning  Hypertension start Norvasc 5 mg    4/11/22    · BP low normal  · Discontinue amlodipine 1. Will monitor       . GI INPUT noted   Dysphagia to solid  Significant weight loss and malnutrition with BMI of 17  No significant acid reflux symptoms  Slight elevation of the alkaline phosphatase  NSAIDs use     Recommendations:   EGD t  PPI  Repeat alkaline phosphatase  DC NSAIDs         4/12/22    · Thyroid fx ok    Medications: Allergies: Allergies   Allergen Reactions    Pcn [Penicillins]        Current Meds:   Scheduled Meds:    gabapentin  100 mg Oral TID    QUEtiapine  400 mg Oral Nightly    pantoprazole  40 mg Oral QAM AC    sucralfate  1 g Oral 4 times per day    amLODIPine  5 mg Oral Daily     Continuous Infusions:   · PRN Meds: meperidine, HYDROmorphone, acetaminophen, aluminum & magnesium hydroxide-simethicone, hydrOXYzine, ibuprofen, polyethylene glycol, traZODone  · On amlodipine , protonix , carafate 2. Vitals ok   3. Eating better   4. Eating better   Will sign off . Thanks . 5. Please call again , if neeeded . Dianna Stringer MD  4/12/2022  2:21 PM    Copy sent to Dr. Maria Del Carmen Verdugo    Please note that this chart was generated using voice recognition Dragon dictation software. Although every effort was made to ensure the accuracy of this automated transcription, some errors in transcription may have occurred.

## 2022-04-12 NOTE — PLAN OF CARE
Problem: Altered Mood, Depressive Behavior:  Goal: Ability to disclose and discuss suicidal ideas will improve  Description: Ability to disclose and discuss suicidal ideas will improve  Outcome: Ongoing  Goal: Able to verbalize support systems  Description: Able to verbalize support systems  Outcome: Ongoing   Patient denies any suicidal ideas right now, but was educated if these ideas come up again to seek staff for safety. Q15 checks are in place. Patient reports feeling a some depression today, patient states that she feels safe while on unit.

## 2022-04-12 NOTE — PROGRESS NOTES
Daily Progress Note  4/12/2022    Patient Name: Bob Stanton:  Depression with suicidal ideation with plan and intent to use a knife to cut self          SUBJECTIVE:      Patient is seen today for a follow up assessment. Patient is compliant with scheduled medications. Patient has not required emergency medications in the past 24 hours. Patient is agreeable to interview in her room today where she reports that her mood is not too good. She endorses significant depression and anxiety and rates both as a 9 (010 scale with 0 being none and 10 being the worst). She reports that her sleep has been poor with difficulty falling asleep and staying asleep throughout the night. She states that she continues to be tired throughout the day. She reports that her appetite is poor and is requesting Ensure protein shakes which will be ordered for her as her BMI is only 17. Toño Simms continues to endorse fleeting suicidal ideation stating that the thoughts come and go throughout the day. She denies homicidal ideation. Due to continued suicidal thoughts patient reports that she would feel very unsafe out of the hospital at this time. Patient reports that in the past she is struggled with auditory hallucinations that have mostly come with depression however states that she has not been experiencing them since admission and reports improvement. She denies visual hallucinations. She continues to feel very hopeless and helpless however states that she has spoken with [facility] and is awaiting a call back to see if they have accepted her. She denies any medication side effects or other medical concerns at this time.        Appetite:  [] Normal/Adequate/Unchanged  [] Increased  [x] Decreased      Sleep:       [] Normal/Adequate/Unchanged  [] Fair  [x] Poor      Group Attendance on Unit:   [x] Yes  [] Selectively    [] No    Medication Side Effects:  Patient denies any medication side effects PRN  pantoprazole (PROTONIX) tablet 40 mg, 40 mg, Oral, QAM AC  sucralfate (CARAFATE) tablet 1 g, 1 g, Oral, 4 times per day  amLODIPine (NORVASC) tablet 5 mg, 5 mg, Oral, Daily  acetaminophen (TYLENOL) tablet 650 mg, 650 mg, Oral, Q4H PRN  aluminum & magnesium hydroxide-simethicone (MAALOX) 200-200-20 MG/5ML suspension 30 mL, 30 mL, Oral, Q6H PRN  hydrOXYzine (ATARAX) tablet 50 mg, 50 mg, Oral, TID PRN  ibuprofen (ADVIL;MOTRIN) tablet 400 mg, 400 mg, Oral, Q6H PRN  polyethylene glycol (GLYCOLAX) packet 17 g, 17 g, Oral, Daily PRN  traZODone (DESYREL) tablet 50 mg, 50 mg, Oral, Nightly PRN    ASSESSMENT  Bipolar I disorder, most recent episode depressed, severe without psychotic features ([facility]         HANDOFF  Patient symptoms are: Remains Unstable. Medications as determined by attending physician  Monitor need and frequency of PRN medications. Encourage participation in groups and milieu. Probable discharge is to be determined by MD.     Electronically signed by HENRIETTA Bennett CNP on 4/12/2022 at 3:06 PM    **This report has been created using voice recognition software. It may contain minor errors which are inherent in voice recognition technology. **  I independently saw and evaluated the patient. I reviewed the  documentation above. Any additional comments or changes to the midlevel provider's documentation are stated below otherwise agree with assessment. The patient continues to be anxious depressed and irritable. She has not slept well through the night and that is bothering her. We discussed that we will change her Seroquel to the regular tablet instead of extended release. I will also add Depakote 500 mg at nighttime      PLAN  Medications as noted above  Attempt to develop insight  Psycho-education conducted. Supportive Therapy conducted.   Probable discharge is 2-5 days  Follow-up daily while on inpatient unit    Electronically signed by Sean Gong MD on 4/12/22 at 7:56 PM EDT    .

## 2022-04-12 NOTE — GROUP NOTE
Group Therapy Note    Date: 4/12/2022    Group Start Time: 0437  Group End Time: 1430  Group Topic: Music Therapy    [facility]    Marietta Anaya        Group Therapy Note    Attendees: 3/14         Patient's Goal:  Patients engaged in music appreciation group, sharing and discussing music with peers and staff. Patients goals to increase self-expression; Normalziation of the environment; Increase sense of community;       Notes:  Ambar attended and participated in group as an active listener. Ellinet stated she was trying to think of music though having difficulty picking a song. Patient did reflect to peers when she enjoyed their music. Patient left about 15 minutes before group ended, stating she had to go the bathroom, and did not return. Status After Intervention:  Improved    Participation Level:  Active Listener    Participation Quality: Appropriate, Attentive and Supportive      Speech:  normal      Thought Process/Content: Logical  Linear      Affective Functioning: Congruent      Mood: euthymic      Level of consciousness:  Alert and Attentive      Response to Learning: Progressing to goal      Endings: None Reported    Modes of Intervention: Support, Socialization, Exploration, Activity, Media and Reality-testing      Discipline Responsible: Psychoeducational Specialist      Signature:  Marietta Anaya

## 2022-04-12 NOTE — BH NOTE
patient refused to attend goal group at 0900 after encouragement from staff.  1:1 talk time offered but refused

## 2022-04-13 LAB — SURGICAL PATHOLOGY REPORT: NORMAL

## 2022-04-13 PROCEDURE — APPSS30 APP SPLIT SHARED TIME 16-30 MINUTES

## 2022-04-13 PROCEDURE — 6370000000 HC RX 637 (ALT 250 FOR IP): Performed by: INTERNAL MEDICINE

## 2022-04-13 PROCEDURE — 1240000000 HC EMOTIONAL WELLNESS R&B

## 2022-04-13 PROCEDURE — 6370000000 HC RX 637 (ALT 250 FOR IP): Performed by: PSYCHIATRY & NEUROLOGY

## 2022-04-13 PROCEDURE — 99232 SBSQ HOSP IP/OBS MODERATE 35: CPT | Performed by: PSYCHIATRY & NEUROLOGY

## 2022-04-13 RX ADMIN — GABAPENTIN 100 MG: 100 CAPSULE ORAL at 14:11

## 2022-04-13 RX ADMIN — SUCRALFATE 1 G: 1 TABLET ORAL at 05:51

## 2022-04-13 RX ADMIN — GABAPENTIN 100 MG: 100 CAPSULE ORAL at 07:57

## 2022-04-13 RX ADMIN — HYDROXYZINE HYDROCHLORIDE 50 MG: 50 TABLET, FILM COATED ORAL at 20:42

## 2022-04-13 RX ADMIN — QUETIAPINE FUMARATE 400 MG: 200 TABLET ORAL at 20:42

## 2022-04-13 RX ADMIN — SUCRALFATE 1 G: 1 TABLET ORAL at 17:21

## 2022-04-13 RX ADMIN — SUCRALFATE 1 G: 1 TABLET ORAL at 00:22

## 2022-04-13 RX ADMIN — TRAZODONE HYDROCHLORIDE 50 MG: 50 TABLET ORAL at 20:42

## 2022-04-13 RX ADMIN — IBUPROFEN 400 MG: 400 TABLET ORAL at 20:42

## 2022-04-13 RX ADMIN — SUCRALFATE 1 G: 1 TABLET ORAL at 11:33

## 2022-04-13 RX ADMIN — GABAPENTIN 100 MG: 100 CAPSULE ORAL at 20:42

## 2022-04-13 RX ADMIN — HYDROXYZINE HYDROCHLORIDE 50 MG: 50 TABLET, FILM COATED ORAL at 14:12

## 2022-04-13 RX ADMIN — PANTOPRAZOLE SODIUM 40 MG: 40 TABLET, DELAYED RELEASE ORAL at 07:57

## 2022-04-13 RX ADMIN — SUCRALFATE 1 G: 1 TABLET ORAL at 23:02

## 2022-04-13 RX ADMIN — AMLODIPINE BESYLATE 5 MG: 5 TABLET ORAL at 07:57

## 2022-04-13 RX ADMIN — DIVALPROEX SODIUM 750 MG: 250 TABLET, FILM COATED, EXTENDED RELEASE ORAL at 20:42

## 2022-04-13 ASSESSMENT — PAIN SCALES - GENERAL: PAINLEVEL_OUTOF10: 7

## 2022-04-13 NOTE — GROUP NOTE
HS Goal Group     Date: April 12, 2022   Group Start Time: 2030   Group End Time: 2045   [facility] [address]  Attendees: 6/12     Group Topic: HS Goal Group   Notes: Patient participated in HS goal group. Status After Intervention: Improved   Participation Level:  Active Listener and Interactive   Participation Quality: Appropriate, attentive and supportive   Speech: Normal   Thought Process/Content: Logical and linear   Affective Functioning: Congruent   Wood: WDL   Level of consciousness: Oriented x4   Response to Learning: Progressing to goal; able to verbalize current knowledge/experience, acknowledge new learning, retain information and change behavior    Endings: None reported   Modes of Intervention: Education and exploration     Discipline Responsible: Behavioral Health Tech   Signature: RENEE Garcia

## 2022-04-13 NOTE — PLAN OF CARE
Problem: Altered Mood, Depressive Behavior:  Goal: Ability to disclose and discuss suicidal ideas will improve  Description: Ability to disclose and discuss suicidal ideas will improve  4/13/2022 0028 by Burak Nunez LPN  Outcome: Ongoing  Patient verbalizes a feeling of hopelessness still, however agrees to report these feelings to staff for coping techniques. Problem: Tobacco Use:  Goal: Inpatient tobacco use cessation counseling participation  Description: Inpatient tobacco use cessation counseling participation  Outcome: Ongoing   Patient verbalizes boredom exacerbates the craving for cigarettes, stated she needed to keep busy, for cessation.

## 2022-04-13 NOTE — GROUP NOTE
Group Therapy Note    Date: 4/13/2022    Group Start Time: 1100  Group End Time: 1130  Group Topic: Relaxation    [facility] CHANG Camacho, CTRS    Pt did not attend 1100 relaxation group d/t resting in room despite staff invitation to attend. 1:1 talk time offered as alternative to group session, pt declined.             Signature:  Geraldo Reaves

## 2022-04-13 NOTE — GROUP NOTE
Group Therapy Note    Date: 4/13/2022    Group Start Time: 1330  Group End Time: 1400  Group Topic: Psychoeducation    [facility] CHANG Ramirez, CTRS      Pt did not attend 1330 psycheducation group d/t resting in room despite staff invitation to attend. 1:1 talk time offered as alternative to group session, pt declined.             Signature:  Angela Avalos

## 2022-04-13 NOTE — PROGRESS NOTES
Daily Progress Note  4/13/2022    Patient Name: Brooklyn Hernandez:  Depression with suicidal ideation with plan and intent to use a knife to cut self          SUBJECTIVE:      Patient is seen today for a follow up assessment. Patient is compliant with scheduled medications. Patient has not required emergency medications in the past 24 hours. Patient is agreeable to interview in unit sensory room today. She continues to endorse significant depression and anxiety today. She reports that she slept fairly well last night however she did wake up a couple of times throughout the night. She continues to feel very tired throughout the day. She continues to state that her appetite is poor and reports that she has not received any Ensure protein shakes on her tray even though this has been ordered for her. Crystal Tejada continues to endorse fleeting suicidal ideation however reports the thoughts are less severe and intense today. She reports \"I just keep having this feeling that life is not worth it. \"  She denies homicidal thoughts. She does continue to endorse significant racing thoughts and states that she feels very \"scatterbrained\" today. She denies any auditory or visual hallucinations. She denies paranoia. She continues to feel significantly hopeless and helpless and states that she feels very \"worthless. \" She states, \"I've just hit rock bottom. \"  She states that she has made contact with [facility] and they do have a place for her however they need some information regarding her care but social work is on top of this. She denies any medication side effects or other medical concerns at this time.       Appetite:  [] Normal/Adequate/Unchanged  [] Increased  [x] Decreased      Sleep:       [] Normal/Adequate/Unchanged  [x] Fair  [] Poor      Group Attendance on Unit:   [x] Yes  [] Selectively    [] No    Medication Side Effects:  Patient denies any medication side effects at the time of assessment. Mental Status Exam  Level of consciousness: Alert and awake. Appearance: Appropriate attire for setting, seated in chair, with fair  grooming and hygiene. Behavior/Motor: Approachable, visibly anxious, slight psychomotor agitation  Attitude toward examiner: Cooperative, attentive, good eye contact. Speech: Normal rate, normal volume, depressed tone. Mood:  Patient reports \"scatterbrained and worthless\". Affect: Depressed, anxious  Thought processes: Linear and coherent. Thought content: Denies homicidal ideation. Suicidal Ideation: Fleeting suicidal ideations  Delusions: No evidence of delusions. Denies paranoia. Perceptual Disturbance: Patient does not appear to be responding to internal stimuli. Denies auditory hallucinations. Denies visual hallucinations. Cognition: Oriented to self, location, time, and situation. Memory: Intact. Insight & Judgement: Poor. Data   height is 5' 9\" (1.753 m) and weight is 120 lb (54.4 kg). Her oral temperature is 98.6 °F (37 °C). Her blood pressure is 100/81 and her pulse is 102. Her respiration is 14 and oxygen saturation is 94%. Labs:   Admission on 04/09/2022   Component Date Value Ref Range Status    Lithium Lvl 04/09/2022 <0.1* 0.6 - 1.2 mmol/L Final    Lithium Dose Amount 04/09/2022 reschedule   Final    Lithium Date Last Dose 04/09/2022 rescheduled   Final    Lithium Dose Time 04/09/2022 reschedule   Final    TSH 04/11/2022 2.83  0.30 - 5.00 uIU/mL Final    Surgical Pathology Report 04/11/2022    Final                    Value:-- Diagnosis --  A. DUODENUM, BIOPSY:       -NO HISTOLOGIC ABNORMALITY IDENTIFIED. B.  STOMACH, BIOPSY:       -MODERATE ACTIVE CHRONIC GASTRITIS. -POSITIVE FOR HELICOBACTER PYLORI. C.  ESOPHAGUS, BIOPSY (SQUAMOUS MUCOSA): -MINIMAL FOCAL ACTIVE INFLAMMATION.       Rosibel Pulido M.D.  **Electronically Signed Out**         [facility]/4/13/2022       Clinical Information  Pre-op Diagnosis: DYSPHAGIA   Operative Findings:  DUODENUM BIOPSY; STOMACH BX FOR GASTRITIS;  ESOPHAGUS BIOPSY FOR ESOPHAGITIS   Operation Performed:  EGD BIOPSY     Source of Specimen  A: DUODENAL BX  B: STOMACH BIOPSY FOR GASTRITIS  C: ESOPHAGEAL BX FOR ESOPHAGITIS    Gross Description  A. \"EMILIA LIU, DUODENUM BIOPSY\" Two tan-white tissue fragments from  0.3 to 0.4 cm and are 0.7 x 0.3 x 0.2 cm in aggregate. Entirely 1cs. B. \"EMILIA LIU, STOMACH BIOPSY\" Multiple tan-white tissue fragments  from 0.2 to 0.4 cm and are 1.0 x 0.5 x 0.2 cm in aggregate. Entirely  1cs. C.  \"EMILIA LIU, ESOPHAGUS BIOPS                          Y\" Four tan-white tissue fragments  from 0.2 to 0.5 cm and are 1.3 x 0.2 x 0.1 cm in aggregate. Entirely  1cs. yr tm      Microscopic Description  A.  Small intestine mucosa present, negative for villous atrophy,  intraepithelial lymphocytosis, ulcer, fissure, granuloma, and  neoplasm. Microscopic features of celiac disease are not detected. B.  Gastric tissues negative for ulcer, intestinal metaplasia,  dysplasia, and neoplasm. With no definite micro-organisms seen by  H&E stain, immunohistochemical stain (control is reactive) is  performed for detection of Helicobacter and the result is positive. C.  Squamous and no glandular mucosa is present. No ulcer,  intraepithelial eosinophils, intestinal metaplasia, dysplasia, or  neoplasm is detected. SURGICAL PATHOLOGY CONSULTATION       Patient Name: Marivel Fall [facility] Rec: [id number]  Path Number: [id number]    [address] [address]. [facility], [address]                            [phone]  Fax: [phone]           Reviewed patient's current plan of care and vital signs with nursing staff.     Labs reviewed: [x] Yes  Last EKG in EMR reviewed: [x] Yes  QTc: 453    Medications  Current Facility-Administered Medications: divalproex (DEPAKOTE ER) extended release tablet 750 mg, 750 mg, Oral, Nightly  QUEtiapine (SEROQUEL) tablet 400 mg, 400 mg, Oral, Nightly  gabapentin (NEURONTIN) capsule 100 mg, 100 mg, Oral, TID  meperidine (DEMEROL) injection 12.5 mg, 12.5 mg, IntraVENous, Q5 Min PRN  HYDROmorphone (DILAUDID) injection 0.5 mg, 0.5 mg, IntraVENous, Q5 Min PRN  pantoprazole (PROTONIX) tablet 40 mg, 40 mg, Oral, QAM AC  sucralfate (CARAFATE) tablet 1 g, 1 g, Oral, 4 times per day  amLODIPine (NORVASC) tablet 5 mg, 5 mg, Oral, Daily  acetaminophen (TYLENOL) tablet 650 mg, 650 mg, Oral, Q4H PRN  aluminum & magnesium hydroxide-simethicone (MAALOX) 200-200-20 MG/5ML suspension 30 mL, 30 mL, Oral, Q6H PRN  hydrOXYzine (ATARAX) tablet 50 mg, 50 mg, Oral, TID PRN  ibuprofen (ADVIL;MOTRIN) tablet 400 mg, 400 mg, Oral, Q6H PRN  polyethylene glycol (GLYCOLAX) packet 17 g, 17 g, Oral, Daily PRN  traZODone (DESYREL) tablet 50 mg, 50 mg, Oral, Nightly PRN    ASSESSMENT  Bipolar I disorder, most recent episode depressed, severe without psychotic features ([facility] [address]         HANDOFF  Patient symptoms are: Remains Unstable. Medications as discussed with attending physician     Monitor need and frequency of PRN medications. Encourage participation in groups and milieu. Probable discharge is to be determined by MD.     Electronically signed by HENRIETTA Gary CNP on 4/13/2022 at 2:14 PM    **This report has been created using voice recognition software. It may contain minor errors which are inherent in voice recognition technology. **    I independently saw and evaluated the patient. I reviewed the nurse practioner's documentation above. Any additional comments or changes to the    documentation are stated below otherwise agree with assessment. The patient states she found the change in her medications yesterday somewhat beneficial. Belkys Corona has continued to have racing thoughts, irritability and difficulty sleeping though she slept better last night compared to the night before.

## 2022-04-13 NOTE — PLAN OF CARE
Problem: Altered Mood, Depressive Behavior:  Goal: Ability to disclose and discuss suicidal ideas will improve  Description: Ability to disclose and discuss suicidal ideas will improve  4/13/2022 1119 by Darvin Leung RN  Outcome: Ongoing   Patient is calm, controlled and medication compliant. Patient reports fleeting suicidal ideations and reports feelings of depression and anxiety. Patient is polite, reports eating and sleeping adequately with safety checks Q15min and at irregular intervals. Problem: Altered Mood, Depressive Behavior:  Goal: Able to verbalize support systems  Description: Able to verbalize support systems  Outcome: Ongoing    Patient is calm, controlled and medication compliant. Patient reports fleeting suicidal ideations and reports feelings of depression and anxiety. Patient is polite, reports eating and sleeping adequately with safety checks Q15min and at irregular intervals. Problem: Tobacco Use:  Goal: Inpatient tobacco use cessation counseling participation  Description: Inpatient tobacco use cessation counseling participation  4/13/2022 1119 by Darvin Leung RN  Outcome: Ongoing   Smoking education provided.

## 2022-04-13 NOTE — BH NOTE
Emergency Medication Follow-Up Note:    PRN medication of Atarax 50mg PO PRN was effective as evidence by calmly watching TV with peers. Patient denies medication side effects. Will continue to monitor and provide support as needed.

## 2022-04-13 NOTE — BH NOTE
Atarax 50mg PO PRN given for increased anxiety per patient's request. Patient was offered 1:1 talk time, quiet time and diversion. Patient is accepting of medication.

## 2022-04-14 PROCEDURE — 1240000000 HC EMOTIONAL WELLNESS R&B

## 2022-04-14 PROCEDURE — 6370000000 HC RX 637 (ALT 250 FOR IP): Performed by: INTERNAL MEDICINE

## 2022-04-14 PROCEDURE — APPSS30 APP SPLIT SHARED TIME 16-30 MINUTES

## 2022-04-14 PROCEDURE — 99232 SBSQ HOSP IP/OBS MODERATE 35: CPT | Performed by: PSYCHIATRY & NEUROLOGY

## 2022-04-14 PROCEDURE — 6370000000 HC RX 637 (ALT 250 FOR IP): Performed by: PSYCHIATRY & NEUROLOGY

## 2022-04-14 RX ADMIN — GABAPENTIN 100 MG: 100 CAPSULE ORAL at 14:11

## 2022-04-14 RX ADMIN — HYDROXYZINE HYDROCHLORIDE 50 MG: 50 TABLET, FILM COATED ORAL at 10:20

## 2022-04-14 RX ADMIN — GABAPENTIN 100 MG: 100 CAPSULE ORAL at 20:32

## 2022-04-14 RX ADMIN — DIVALPROEX SODIUM 750 MG: 250 TABLET, FILM COATED, EXTENDED RELEASE ORAL at 20:30

## 2022-04-14 RX ADMIN — SUCRALFATE 1 G: 1 TABLET ORAL at 11:46

## 2022-04-14 RX ADMIN — SUCRALFATE 1 G: 1 TABLET ORAL at 06:22

## 2022-04-14 RX ADMIN — TRAZODONE HYDROCHLORIDE 50 MG: 50 TABLET ORAL at 20:31

## 2022-04-14 RX ADMIN — QUETIAPINE FUMARATE 400 MG: 200 TABLET ORAL at 20:30

## 2022-04-14 RX ADMIN — IBUPROFEN 400 MG: 400 TABLET ORAL at 20:30

## 2022-04-14 RX ADMIN — HYDROXYZINE HYDROCHLORIDE 50 MG: 50 TABLET, FILM COATED ORAL at 20:30

## 2022-04-14 RX ADMIN — PANTOPRAZOLE SODIUM 40 MG: 40 TABLET, DELAYED RELEASE ORAL at 06:22

## 2022-04-14 RX ADMIN — GABAPENTIN 100 MG: 100 CAPSULE ORAL at 08:03

## 2022-04-14 RX ADMIN — SUCRALFATE 1 G: 1 TABLET ORAL at 17:12

## 2022-04-14 RX ADMIN — AMLODIPINE BESYLATE 5 MG: 5 TABLET ORAL at 08:03

## 2022-04-14 ASSESSMENT — PAIN SCALES - GENERAL
PAINLEVEL_OUTOF10: 3
PAINLEVEL_OUTOF10: 7

## 2022-04-14 NOTE — BH NOTE
Emergency Medication Follow-Up Note:    PRN medication of Atarax 50mg  PO PRN was effective as evidence by feeling calmer and social with peers. Patient denies medication side effects. Will continue to monitor and provide support as needed.

## 2022-04-14 NOTE — PLAN OF CARE
Problem: Altered Mood, Depressive Behavior:  Goal: Ability to disclose and discuss suicidal ideas will improve  Description: Ability to disclose and discuss suicidal ideas will improve  4/13/2022 2025 by Kat Doss RN  Outcome: Ongoing   Denies current suicidal ideations  Problem: Tobacco Use:  Goal: Inpatient tobacco use cessation counseling participation  Description: Inpatient tobacco use cessation counseling participation  4/13/2022 2025 by Kat Doss RN  Outcome: Ongoing   Declines. Problem: Pain:  Goal: Control of acute pain  Description: Control of acute pain  Outcome: Ongoing   Denies pain. Problem: Nutrition  Goal: Optimal nutrition therapy  Outcome: Ongoing   Eating/drinking well.

## 2022-04-14 NOTE — GROUP NOTE
Group Therapy Note    Date: 4/14/2022    Group Start Time: 1000  Group End Time: 8699  Group Topic: Psychotherapy    [address] [place], MSW, LSW        Group Therapy Note    Attendees: 7/14       Patient was offered group therapy today but declined to participate despite encouragement from staff. 1:1 was offered.         Signature:  Basil Hart MSW, LSW

## 2022-04-14 NOTE — PLAN OF CARE
Problem: Altered Mood, Depressive Behavior:  Goal: Ability to disclose and discuss suicidal ideas will improve  Description: Ability to disclose and discuss suicidal ideas will improve  4/14/2022 0956 by Iesha Jasso RN  Outcome: Ongoing   Patient is calm, controlled and medication compliant. Patient denies suicidal ideations but reports feelings of depression and anxiety. Patient is polite, reports eating and sleeping adequately with safety checks Q15min and at irregular intervals. Problem: Altered Mood, Depressive Behavior:  Goal: Able to verbalize support systems  Description: Able to verbalize support systems  Outcome: Ongoing   Patient is calm, controlled and medication compliant. Patient denies suicidal ideations but reports feelings of depression and anxiety. Patient is polite, reports eating and sleeping adequately with safety checks Q15min and at irregular intervals.    Problem: Tobacco Use:  Goal: Inpatient tobacco use cessation counseling participation  Description: Inpatient tobacco use cessation counseling participation  4/14/2022 0956 by Iesha Jasso RN  Outcome: Ongoing   Smoking education provided  Problem: Pain:  Goal: Pain level will decrease  Description: Pain level will decrease  Outcome: Ongoing   Pain is managed with PRN medication

## 2022-04-14 NOTE — CARE COORDINATION
DISCHARGE UPDATE:  - Writer sends fax to [facility], [facility], and [facility] on this date and time for possible admission.  - [facility] will place follow-up calls later on this date regarding admission availability

## 2022-04-14 NOTE — PROGRESS NOTES
Daily Progress Note  4/14/2022    Patient Name: Evangelina Angeles:  Depression with suicidal ideation with plan and intent to use a knife to cut self          SUBJECTIVE:      Patient is seen today for a follow up assessment. Patient is compliant with scheduled medications. Patient has not required emergency medications in the past 24 hours. Patient is agreeable to interview in the unit sensory room today. She continues to express significant depression and rates it as an 8 (010 scale 0 being none and 10 being the worst). She also endorses anxiety today stating that she is anxious because she has not heard anything from [facility] in the past couple of days. She continues to endorse some racing thoughts however does report some improvement with this. She reports that she slept well last night and feels well rested today. She reports that her appetite is adequate and did receive Ensure protein shakes on her tray today. Patient continues to endorse fleeting suicidal ideation however reports thoughts are less intense and severe today. She continues to feel significantly hopeless and helpless regarding her situation. She denies homicidal ideation. She states without a safe place to go she would feel very unsafe out of the hospital and would be scared that she would decompensate and her suicidal thoughts would increase. She denies auditory visual hallucinations. She denies paranoia. She denies any medication side effects or other medical concerns at this time. This writer spoke with social work who called [facility] while this writer was in the room and left another message. Social work reports that they have left multiple messages however [facility] has not called them back in the past couple of days. If no response from [facility] we will look at other AOD treatment options for patient.        Appetite:  [] Normal/Adequate/Unchanged  [] Increased  [x] Decreased      Sleep:       [] Normal/Adequate/Unchanged  [x] Fair  [] Poor      Group Attendance on Unit:   [x] Yes  [] Selectively    [] No    Medication Side Effects:  Patient denies any medication side effects at the time of assessment. Mental Status Exam  Level of consciousness: Alert and awake. Appearance: Appropriate attire for setting, seated in chair, with fair  grooming and hygiene. Behavior/Motor: Approachable, appears distressed, somewhat irritable  Attitude toward examiner: Cooperative, attentive, fair eye contact. Speech: Normal rate, normal volume, depressed tone. Mood:  Patient reports \"up and down\". Affect: Depressed, anxious   Thought processes: Linear and coherent. Thought content: Denies homicidal ideation. Suicidal Ideation: Fleeting suicidal ideations  Delusions: No evidence of delusions. Denies paranoia. Perceptual Disturbance: Patient does not appear to be responding to internal stimuli. Denies auditory hallucinations. Denies visual hallucinations. Cognition: Oriented to self, location, time, and situation. Memory: Intact. Insight & Judgement: Poor. Data   height is 5' 9\" (1.753 m) and weight is 120 lb (54.4 kg). Her oral temperature is 98.8 °F (37.1 °C). Her blood pressure is 90/70 and her pulse is 77. Her respiration is 16 and oxygen saturation is 99%. Labs:   Admission on 04/09/2022   Component Date Value Ref Range Status    Lithium Lvl 04/09/2022 <0.1* 0.6 - 1.2 mmol/L Final    Lithium Dose Amount 04/09/2022 reschedule   Final    Lithium Date Last Dose 04/09/2022 rescheduled   Final    Lithium Dose Time 04/09/2022 reschedule   Final    TSH 04/11/2022 2.83  0.30 - 5.00 uIU/mL Final    Surgical Pathology Report 04/11/2022    Final                    Value:-- Diagnosis --  A. DUODENUM, BIOPSY:       -NO HISTOLOGIC ABNORMALITY IDENTIFIED. B.  STOMACH, BIOPSY:       -MODERATE ACTIVE CHRONIC GASTRITIS. -POSITIVE FOR HELICOBACTER PYLORI. C.  ESOPHAGUS, BIOPSY (SQUAMOUS MUCOSA): -MINIMAL FOCAL ACTIVE INFLAMMATION. Jovana Giang M.D.  **Electronically Signed Out**         [facility]/4/13/2022       Clinical Information  Pre-op Diagnosis:  DYSPHAGIA   Operative Findings:  DUODENUM BIOPSY; STOMACH BX FOR GASTRITIS;  ESOPHAGUS BIOPSY FOR ESOPHAGITIS   Operation Performed:  EGD BIOPSY     Source of Specimen  A: DUODENAL BX  B: STOMACH BIOPSY FOR GASTRITIS  C: ESOPHAGEAL BX FOR ESOPHAGITIS    Gross Description  A. \"EMILIA LIU, DUODENUM BIOPSY\" Two tan-white tissue fragments from  0.3 to 0.4 cm and are 0.7 x 0.3 x 0.2 cm in aggregate. Entirely 1cs. B. \"EMILIA LIU, STOMACH BIOPSY\" Multiple tan-white tissue fragments  from 0.2 to 0.4 cm and are 1.0 x 0.5 x 0.2 cm in aggregate. Entirely  1cs. C.  \"EMILIA LIU, ESOPHAGUS BIOPS                          Y\" Four tan-white tissue fragments  from 0.2 to 0.5 cm and are 1.3 x 0.2 x 0.1 cm in aggregate. Entirely  1cs. yr tm      Microscopic Description  A.  Small intestine mucosa present, negative for villous atrophy,  intraepithelial lymphocytosis, ulcer, fissure, granuloma, and  neoplasm. Microscopic features of celiac disease are not detected. B.  Gastric tissues negative for ulcer, intestinal metaplasia,  dysplasia, and neoplasm. With no definite micro-organisms seen by  H&E stain, immunohistochemical stain (control is reactive) is  performed for detection of Helicobacter and the result is positive. C.  Squamous and no glandular mucosa is present. No ulcer,  intraepithelial eosinophils, intestinal metaplasia, dysplasia, or  neoplasm is detected. SURGICAL PATHOLOGY CONSULTATION       Patient Name: [facility] Rec: [id number]  Path Number: [id number]    [address] [place] [address].   [place], [address]                            [phone]  Fax: [phone] Reviewed patient's current plan of care and vital signs with nursing staff. Labs reviewed: [x] Yes  Last EKG in EMR reviewed: [x] Yes  QTc: 453    Medications  Current Facility-Administered Medications: divalproex (DEPAKOTE ER) extended release tablet 750 mg, 750 mg, Oral, Nightly  QUEtiapine (SEROQUEL) tablet 400 mg, 400 mg, Oral, Nightly  gabapentin (NEURONTIN) capsule 100 mg, 100 mg, Oral, TID  meperidine (DEMEROL) injection 12.5 mg, 12.5 mg, IntraVENous, Q5 Min PRN  HYDROmorphone (DILAUDID) injection 0.5 mg, 0.5 mg, IntraVENous, Q5 Min PRN  pantoprazole (PROTONIX) tablet 40 mg, 40 mg, Oral, QAM AC  sucralfate (CARAFATE) tablet 1 g, 1 g, Oral, 4 times per day  amLODIPine (NORVASC) tablet 5 mg, 5 mg, Oral, Daily  acetaminophen (TYLENOL) tablet 650 mg, 650 mg, Oral, Q4H PRN  aluminum & magnesium hydroxide-simethicone (MAALOX) 200-200-20 MG/5ML suspension 30 mL, 30 mL, Oral, Q6H PRN  hydrOXYzine (ATARAX) tablet 50 mg, 50 mg, Oral, TID PRN  ibuprofen (ADVIL;MOTRIN) tablet 400 mg, 400 mg, Oral, Q6H PRN  polyethylene glycol (GLYCOLAX) packet 17 g, 17 g, Oral, Daily PRN  traZODone (DESYREL) tablet 50 mg, 50 mg, Oral, Nightly PRN    ASSESSMENT  Bipolar I disorder, most recent episode depressed, severe without psychotic features ([facility] [address]         HANDOFF  Patient symptoms are: Remains Unstable. Medications as determined by attending physician  Monitor need and frequency of PRN medications. Encourage participation in groups and milieu. Probable discharge is to be determined by MD.     Electronically signed by HENRIETTA Owen CNP on 4/14/2022 at 2:45 PM    **This report has been created using voice recognition software. It may contain minor errors which are inherent in voice recognition technology. **      .I independently saw and evaluated the patient. I reviewed the nurse practioner's documentation above.   Any additional comments or changes to the    documentation are stated below otherwise agree with assessment. The patient reports a slight improvement in her irritability and depression.  She has slept better but she continues to have high levels of anxiety and some depression.  She is leandro for safety on the unit.  Her expected length of stay is 1 day      PLAN  Medications as noted above  Attempt to develop insight  Psycho-education conducted. Supportive Therapy conducted.   Probable discharge is 1 day  Follow-up daily while on inpatient unit    Electronically signed by Jem Cummings MD on 4/14/22 at 9:40 PM EDT

## 2022-04-14 NOTE — GROUP NOTE
Group Therapy Note    Date: 4/14/2022    Group Start Time: 1100  Group End Time: 4306  Group Topic: Music Therapy    [facility]    Dyana Brito        Group Therapy Note    Attendees: 5/14         Patient's Goal:  Patients engaged in advice group, sharing preferred music and offering peers advice based on their interpretations of the songs. Patient goals to increase peer support, increase socialization, increase sense of community,       Notes:  Patient attended and participated in group, sharing music and advice with peers and having positive interactions with peers and staff throughout. Status After Intervention:  Improved    Participation Level:  Active Listener and Interactive    Participation Quality: Appropriate, Attentive and Sharing      Speech:  normal      Thought Process/Content: Logical  Linear      Affective Functioning: Congruent      Mood: euthymic      Level of consciousness:  Alert and Attentive      Response to Learning: Able to verbalize current knowledge/experience and Progressing to goal      Endings: None Reported    Modes of Intervention: Support, Socialization, Exploration, Activity, Media and Reality-testing      Discipline Responsible: Psychoeducational Specialist      Signature:  Dyana Brito

## 2022-04-14 NOTE — CARE COORDINATION
DISCHARGE PLANNING UPDATE:  - PT has completed her intake with the nursing team at [facility] [place] and  and PT have been waiting for confirmation to enter the program.    - This writer has called and left messages daily at [facility] and have not received a return call.   This writer has called twice on this date and left 2 separate messages at the [facility] meets with PT, advises problem with [facility] and she states she has not been able to get confirmation or any further than the  or to leave messages with the nurses station.  - PT and writer have decided to look into other options for AOD placement

## 2022-04-15 VITALS
SYSTOLIC BLOOD PRESSURE: 108 MMHG | RESPIRATION RATE: 14 BRPM | BODY MASS INDEX: 17.77 KG/M2 | TEMPERATURE: 98.2 F | DIASTOLIC BLOOD PRESSURE: 69 MMHG | WEIGHT: 120 LBS | HEIGHT: 69 IN | HEART RATE: 68 BPM | OXYGEN SATURATION: 99 %

## 2022-04-15 PROCEDURE — 6370000000 HC RX 637 (ALT 250 FOR IP): Performed by: PSYCHIATRY & NEUROLOGY

## 2022-04-15 PROCEDURE — 6370000000 HC RX 637 (ALT 250 FOR IP): Performed by: INTERNAL MEDICINE

## 2022-04-15 PROCEDURE — 99239 HOSP IP/OBS DSCHRG MGMT >30: CPT | Performed by: PSYCHIATRY & NEUROLOGY

## 2022-04-15 RX ORDER — AMLODIPINE BESYLATE 5 MG/1
5 TABLET ORAL DAILY
Qty: 30 TABLET | Refills: 3 | Status: SHIPPED | OUTPATIENT
Start: 2022-04-16 | End: 2022-04-15

## 2022-04-15 RX ORDER — QUETIAPINE FUMARATE 400 MG/1
400 TABLET, FILM COATED ORAL NIGHTLY
Qty: 30 TABLET | Refills: 0 | Status: SHIPPED | OUTPATIENT
Start: 2022-04-15

## 2022-04-15 RX ORDER — DIVALPROEX SODIUM 250 MG/1
750 TABLET, EXTENDED RELEASE ORAL NIGHTLY
Qty: 90 TABLET | Refills: 0 | Status: SHIPPED | OUTPATIENT
Start: 2022-04-15

## 2022-04-15 RX ORDER — PANTOPRAZOLE SODIUM 40 MG/1
40 TABLET, DELAYED RELEASE ORAL
Qty: 30 TABLET | Refills: 0 | Status: SHIPPED | OUTPATIENT
Start: 2022-04-16

## 2022-04-15 RX ORDER — DIVALPROEX SODIUM 250 MG/1
750 TABLET, EXTENDED RELEASE ORAL NIGHTLY
Qty: 30 TABLET | Refills: 3 | Status: SHIPPED | OUTPATIENT
Start: 2022-04-15 | End: 2022-04-15

## 2022-04-15 RX ORDER — AMLODIPINE BESYLATE 5 MG/1
5 TABLET ORAL DAILY
Qty: 30 TABLET | Refills: 3 | Status: SHIPPED | OUTPATIENT
Start: 2022-04-16

## 2022-04-15 RX ORDER — PANTOPRAZOLE SODIUM 40 MG/1
40 TABLET, DELAYED RELEASE ORAL
Qty: 30 TABLET | Refills: 3 | Status: SHIPPED | OUTPATIENT
Start: 2022-04-16 | End: 2022-04-15

## 2022-04-15 RX ORDER — QUETIAPINE FUMARATE 400 MG/1
400 TABLET, FILM COATED ORAL NIGHTLY
Qty: 30 TABLET | Refills: 0 | Status: SHIPPED | OUTPATIENT
Start: 2022-04-15 | End: 2022-04-15

## 2022-04-15 RX ADMIN — PANTOPRAZOLE SODIUM 40 MG: 40 TABLET, DELAYED RELEASE ORAL at 06:29

## 2022-04-15 RX ADMIN — SUCRALFATE 1 G: 1 TABLET ORAL at 06:29

## 2022-04-15 RX ADMIN — SUCRALFATE 1 G: 1 TABLET ORAL at 00:22

## 2022-04-15 RX ADMIN — AMLODIPINE BESYLATE 5 MG: 5 TABLET ORAL at 08:11

## 2022-04-15 RX ADMIN — GABAPENTIN 100 MG: 100 CAPSULE ORAL at 08:11

## 2022-04-15 RX ADMIN — HYDROXYZINE HYDROCHLORIDE 50 MG: 50 TABLET, FILM COATED ORAL at 08:22

## 2022-04-15 NOTE — DISCHARGE SUMMARY
DISCHARGE SUMMARY      Patient ID:  Ave Garza  [id number]  37 y.o.  1970    Admit date: 4/9/2022    Discharge date and time: 4/15/2022    Disposition: Home     Admitting Physician: Christiano Zamarripa MD     Discharge Physician: Dr Tamika Jacques MD    Admission Diagnoses: Depression with suicidal ideation [F32. A, R45.851]    Admission Condition: poor    Discharged Condition: stable    Admission Circumstance: Ave Garza is a 45 y.o. female who has a past medical history of bipolar disorder, anxiety, PTSD, and substance abuse. Patient presented to the ED via a friend after being found in the garage with multiple lines and threatening to end her life. Patient is connected with [facility] and reports having an upcoming appointment at the end of April. This is patient's first admission to [facility].     Patient was seen for initial evaluation today. Nursing staff report patient has been irritable and demanding on the unit since admission. She was resting in bed on approach. Patient responded to verbal stimuli and continues to present as irritable and was occasionally hostile toward writer during assessment. Patient endorses multiple life stressors causing an increase in symptoms of depression lately. She reports that the most significant event lately was being kicked out of her daughter's home and being told she cannot return. She states she has been living with her daughter since September and now she has nowhere to go. She refused to discuss the events which led to her daughter asking her to leave. Patient is describing her mood as \"really bad\" today. She reports having depression \"my whole life\". Lately she has noticed difficulty falling asleep and staying asleep, anhedonia, low motivation, feelings of worthlessness, hopelessness and helplessness, decreased energy and concentration, poor appetite with a 30 pound weight loss over 2 months, psychomotor slowing, and increasing suicidal ideation.   Patient denies any previous suicide attempts. Patient continues to endorse suicidal ideation today and states \"I cannot think straight\". Patient is also endorsing a history of anxiety with panic attacks. She describes current levels of anxiety as excessive and states that she is now worrying about where she will live, feels restless and on edge, is fatigued with muscle tension, poor concentration and nervousness. She reports having her last panic attack 2.5 weeks ago and identifies symptoms of trembling, sweating, racing heart, chest pressure, shortness of breath, and detachment from self/reality.       Patient became more irritable as assessment continued. She began to answer many questions with \"I do not know\" or \"I am not answering any more of your questions\". She did confirm history of past diagnosis of bipolar disorder but refused to go into detail regarding symptomology or time of most recent event. Patient denies OCD or phobias. She reports she has never experienced psychosis. She does endorse a history of PTSD and states \"I have had trauma my whole life\" but refuses to discuss this further. Patient reports she has taken lithium and Seroquel for several years. Patient initially denied any recent drug or alcohol abuse, however patient UDS was positive for cocaine and marijuana. She then shrugged her shoulders and stated \"I do not know\". Patient refused to cooperate further with assessment.     Patient is unable to contract for safety in the community and warrants hospitalization for stabilization, medication management, and therapeutic groups only. PAST MEDICAL/PSYCHIATRIC HISTORY:   Past Medical History:   Diagnosis Date    Psychiatric problem        FAMILY/SOCIAL HISTORY:  History reviewed. No pertinent family history.   Social History     Socioeconomic History    Marital status:      Spouse name: Not on file    Number of children: Not on file    Years of education: Not on file    Highest education level: Not on file   Occupational History    Not on file   Tobacco Use    Smoking status: Current Every Day Smoker     Packs/day: 1.00     Types: Cigarettes    Smokeless tobacco: Never Used   Substance and Sexual Activity    Alcohol use: Not Currently    Drug use: Not Currently    Sexual activity: Not Currently   Other Topics Concern    Not on file   Social History Narrative    Not on file     Social Determinants of Health     Financial Resource Strain:     Difficulty of Paying Living Expenses: Not on file   Food Insecurity:     Worried About Running Out of Food in the Last Year: Not on file    Arnol of Food in the Last Year: Not on file   Transportation Needs:     Lack of Transportation (Medical): Not on file    Lack of Transportation (Non-Medical):  Not on file   Physical Activity:     Days of Exercise per Week: Not on file    Minutes of Exercise per Session: Not on file   Stress:     Feeling of Stress : Not on file   Social Connections:     Frequency of Communication with Friends and Family: Not on file    Frequency of Social Gatherings with Friends and Family: Not on file    Attends Anglican Services: Not on file    Active Member of [address] [place] [address] or Organizations: Not on file    Attends Club or Organization Meetings: Not on file    Marital Status: Not on file   Intimate Partner Violence:     Fear of Current or Ex-Partner: Not on file    Emotionally Abused: Not on file    Physically Abused: Not on file    Sexually Abused: Not on file   Housing Stability:     Unable to Pay for Housing in the Last Year: Not on file    Number of [place] in the Last Year: Not on file    Unstable Housing in the Last Year: Not on file       MEDICATIONS:    Current Facility-Administered Medications:     divalproex (DEPAKOTE ER) extended release tablet 750 mg, 750 mg, Oral, Nightly, Jose Manuel Urbina MD, 750 mg at 04/14/22 2030    QUEtiapine (SEROQUEL) tablet 400 mg, 400 mg, Oral, Nightly, Jose Manuel Urbina MD, 400 mg at 04/14/22 2030    gabapentin (NEURONTIN) capsule 100 mg, 100 mg, Oral, TID, Abdulkadir Thrasher MD, 100 mg at 04/15/22 0811    meperidine (DEMEROL) injection 12.5 mg, 12.5 mg, IntraVENous, Q5 Min PRN, Audra Jean MD    HYDROmorphone (DILAUDID) injection 0.5 mg, 0.5 mg, IntraVENous, Q5 Min PRN, Audra Jean MD    pantoprazole (PROTONIX) tablet 40 mg, 40 mg, Oral, QAM AC, Valerie Elliott MD, 40 mg at 04/15/22 0629    sucralfate (CARAFATE) tablet 1 g, 1 g, Oral, 4 times per day, Edy Castañeda MD, 1 g at 04/15/22 0629    amLODIPine (NORVASC) tablet 5 mg, 5 mg, Oral, Daily, Joseph Marcum MD, 5 mg at 04/15/22 9589    acetaminophen (TYLENOL) tablet 650 mg, 650 mg, Oral, Q4H PRN, Abdulkadir Thrasher MD, 650 mg at 04/10/22 1216    aluminum & magnesium hydroxide-simethicone (MAALOX) 200-200-20 MG/5ML suspension 30 mL, 30 mL, Oral, Q6H PRN, Abdulkadir Thrasher MD    hydrOXYzine (ATARAX) tablet 50 mg, 50 mg, Oral, TID PRN, Abdulkadir Thrasher MD, 50 mg at 04/15/22 8303    ibuprofen (ADVIL;MOTRIN) tablet 400 mg, 400 mg, Oral, Q6H PRN, Abdulkadir Thrasher MD, 400 mg at 04/14/22 2030    polyethylene glycol (GLYCOLAX) packet 17 g, 17 g, Oral, Daily PRN, Abdulkadir Thrasher MD    traZODone (DESYREL) tablet 50 mg, 50 mg, Oral, Nightly PRN, Abdulkadir Thrasher MD, 50 mg at 04/14/22 2031    Examination:  /69   Pulse 68   Temp 98.2 °F (36.8 °C) (Oral)   Resp 14   Ht 5' 9\" (1.753 m)   Wt 120 lb (54.4 kg)   SpO2 99%   BMI 17.72 kg/m²   Gait - steady    HOSPITAL COURSE[de-identified]  Following admission to the hospital, patient had a complete physical exam and blood work up. The patient was referred to Internal Medicine. Patient was monitored closely with suicide precaution  Patient was started on Seroquel. Depakote was added to help with irritability and anger. Was encouraged to participate in group and other milieu activity  Patient started to feel better with this combination of treatment.   Significant progress in the symptoms since admission. Mood is improved  The patient denies AVH or paranoid thoughts  The patient denies any hopelessness or worthlessness  No active SI/HI  Appetite:  [x] Normal  [] Increased  [] Decreased    Sleep:       [x] Normal  [] Fair       [] Poor            Energy:    [x] Normal  [] Increased  [] Decreased     SI [] Present  [x] Absent  HI  []Present  [x] Absent   Aggression:  [] yes  [] no  Patient is [x] able  [] unable to CONTRACT FOR SAFETY   Medication side effects(SE):  [x] None(Psych. Meds.) [] Other      Mental Status Examination on discharge:    Level of consciousness:  within normal limits   Appearance:  well-appearing  Behavior/Motor:  no abnormalities noted  Attitude toward examiner:  attentive and good eye contact  Speech:  spontaneous, normal rate and normal volume   Mood: anxious  Affect:  mood congruent  Thought processes:  linear, goal directed and coherent   Thought content:  Suicidal Ideation:  denies suicidal ideation  Delusions:  no evidence of delusions  Perceptual Disturbance:  denies any perceptual disturbance  Cognition:  oriented to person, place, and time   Concentration intact  Memory intact  Insight good   Judgement fair   Fund of Knowledge adequate      ASSESSMENT:  Patient symptoms are:  [x] Well controlled  [x] Improving  [] Worsening  [] No change      Diagnosis:  Principal Problem:    Bipolar I disorder, most recent episode depressed, severe without psychotic features ([facility] [address]  Active Problems:    Cocaine abuse ([facility] [address]    Cannabis abuse    Severe protein-calorie malnutrition ([facility]    Failure to thrive in adult    Adult body mass index less than 19    Other dysphagia  Resolved Problems:    * No resolved hospital problems. *      LABS:    No results for input(s): WBC, HGB, PLT in the last 72 hours. No results for input(s): NA, K, CL, CO2, BUN, CREATININE, GLUCOSE in the last 72 hours. No results for input(s): BILITOT, ALKPHOS, AST, ALT in the last 72 hours.   Lab Results   Component Value Date    BARBSCNU NEGATIVE 04/08/2022    LABBENZ NEGATIVE 04/08/2022    LABMETH NEGATIVE 04/08/2022     Lab Results   Component Value Date    TSH 2.83 04/11/2022     Lab Results   Component Value Date    LITHIUM <0.1 (L) 04/09/2022     No results found for: VALPROATE, CBMZ    RISK ASSESSMENT AT DISCHARGE: Low risk for suicide and homicide. Treatment Plan:  Reviewed current Medications with the patient. Education provided on the complaince with treatment. Risks, benefits, side effects, drug-to-drug interactions and alternatives to treatment were discussed. Encourage patient to attend outpatient follow up appointment and therapy. Patient was advised to call the outpatient provider, visit the nearest ED or call 911 if symptoms are not manageable. Medication List      START taking these medications    amLODIPine 5 MG tablet  Commonly known as: NORVASC  Take 1 tablet by mouth daily  Start taking on: April 16, 2022     divalproex 250 MG extended release tablet  Commonly known as: DEPAKOTE ER  Take 3 tablets by mouth at bedtime     pantoprazole 40 MG tablet  Commonly known as: PROTONIX  Take 1 tablet by mouth every morning (before breakfast)  Start taking on: April 16, 2022     QUEtiapine 400 MG tablet  Commonly known as: SEROQUEL  Take 1 tablet by mouth nightly        CONTINUE taking these medications    gabapentin 100 MG capsule  Commonly known as: NEURONTIN  Take 1 capsule by mouth 2 times daily as needed (foot pain) for up to 12 doses.  Intended supply: 30 days     ibuprofen 600 MG tablet  Commonly known as: ADVIL;MOTRIN  Take 1 tablet by mouth every 6 hours as needed for Pain           Where to Get Your Medications      These medications were sent to [facility], [address], [address] [place] [id number]    Phone: [phone]   · amLODIPine 5 MG tablet  · divalproex 250 MG extended release tablet  · pantoprazole 40 MG tablet  · QUEtiapine 400 MG tablet               Core Measures statement:   Not applicable      TIME SPENT - 35 MINUTES TO COMPLETE THE EVALUATION, DISCHARGE SUMMARY, MEDICATION RECONCILIATION AND FOLLOW UP CARE                                         Candy Taylor is a 45 y.o. female being evaluated Fe Myers MD on 4/15/2022 at 9:18 AM    An electronic signature was used to authenticate this note. **This report has been created using voice recognition software. It may contain minor errors which are inherent in voice recognition technology. **

## 2022-04-15 NOTE — BH NOTE
[facility]  Discharge Note    Pt discharged with followings belongings:   Dental Appliances: None  Vision - Corrective Lenses: None  Hearing Aid: None  Jewelry: None  Body Piercings Removed: N/A  Clothing: None  Were All Patient Medications Collected?: Yes  Other Valuables: None   Valuables sent home with N/A or returned to patient. Patient education on aftercare instructions: Information faxed to [facility] by nurse at 11:59 AM. Patient verbalize understanding of AVS: Yes. Status EXAM upon discharge:  Status and Exam  Normal: No  Facial Expression: Flat  Affect: Blunt  Level of Consciousness: Alert  Mood:Normal: No  Mood: Depressed,Anxious  Motor Activity:Normal: Yes  Motor Activity: Decreased  Interview Behavior: Cooperative  Preception: [place] to Person,[place] to Time,[place] to Place,[place] to Situation  Attention:Normal: Yes  Attention: Distractible  Thought Processes: Circumstantial  Thought Content:Normal: No  Thought Content: Preoccupations  Hallucinations: None  Delusions: No  Memory:Normal: Yes  Memory: Poor Recent  Insight and Judgment: No  Insight and Judgment: Poor Insight  Present Suicidal Ideation: No  Present Homicidal Ideation: No      Metabolic Screening:    Lab Results   Component Value Date    LABA1C 4.9 09/02/2014       Lab Results   Component Value Date    CHOL 152 03/05/2021    CHOL 168 09/02/2014     Lab Results   Component Value Date    TRIG 106 03/05/2021    TRIG 264 (H) 09/02/2014     Lab Results   Component Value Date    HDL 56 03/05/2021    HDL 43 09/02/2014     No components found for: LDLCAL  No results found for: LABVLDL     Pt discharged to [facility] at 1200 via [place] cab with all belongings. Pt encouraged to follow up with Doctor Wyatt Fox in 2 weeks.      Jennifer Ruvalcaba RN

## 2022-04-15 NOTE — PLAN OF CARE
Problem: Altered Mood, Depressive Behavior:  Goal: Ability to disclose and discuss suicidal ideas will improve  Description: Ability to disclose and discuss suicidal ideas will improve  4/15/2022 0847 by Ronnie Rose RN  Outcome: Ongoing  Pt denies thoughts of self-harm, safety checks continue Q15 minutes. Admits to ongoing depression and anxiety, especially in relation to upcoming discharge. Flat. Requests as needed medications appropriately. Compliant with scheduled medications. Cooperative with assessment and often in day area, social. No issue with sleep at this time. Problem: Tobacco Use:  Goal: Inpatient tobacco use cessation counseling participation  Description: Inpatient tobacco use cessation counseling participation  4/15/2022 0847 by Ronnie Rose RN  Outcome: Ongoing     Problem: Pain:  Goal: Pain level will decrease  Description: Pain level will decrease  4/15/2022 0847 by Ronnie Rose RN      Outcome: Ongoing      Pt assessed for pain and treated as necessary, will continue to monitor. Problem: Nutrition  Goal: Optimal nutrition therapy  4/15/2022 0847 by Ronnie Rose RN  Outcome: Ongoing  Pt out for meals and eats majority of tray. No complaints about appetite.

## 2022-04-15 NOTE — BH NOTE
Patient given tobacco quitline number [phone] at this time. With nurse observation patient called number for information and follow up. Continue to reinforce the dangers of long term tobacco use and why tobacco cessation is important to patient.

## 2022-04-15 NOTE — DISCHARGE INSTR - DIET
Good nutrition is important when healing from an illness, injury, or surgery. Follow any nutrition recommendations given to you during your hospital stay. If you were given an oral nutrition supplement while in the hospital, continue to take this supplement at home. You can take it with meals, in-between meals, and/or before bedtime. These supplements can be purchased at most local grocery stores, pharmacies, and chain [employer] at work-stores. If you have any questions about your diet or nutrition, call the hospital and ask for the dietitian.   General diet  Increase fluids

## 2022-04-15 NOTE — GROUP NOTE
Group Therapy Note    Date: 4/15/2022    Group Start Time: 1030  Group End Time: 5260  Group Topic: Music Therapy    DENG Aguillon        Group Therapy Note    Pt did not attend music therapy group d/t resting in room despite staff invitation to attend. 1:1 talk time offered as alternative to group session, pt declined.

## 2022-05-02 ENCOUNTER — TELEPHONE (OUTPATIENT)
Dept: GASTROENTEROLOGY | Age: 52
End: 2022-05-02

## 2022-05-02 NOTE — LETTER
[facility]  [address]  Phone: [phone]  Fax: [phone]    Edy Castañeda MD        May 2, 2022     Patient: Clarke Alvarado   YOB: 1970   Date of Visit: 5/2/2022       Viviana Riggs,    Our office is trying to contact you regarding your recent hospital visit. Please call our office to schedule a follow up appointment so we can go over your testing results.        Sincerely,        Edy Castañeda MD

## 2022-05-02 NOTE — TELEPHONE ENCOUNTER
Pt pathology shows positive for H Pylori. No phone contact information available for pt. Mailing letter.

## 2023-07-20 NOTE — CARE COORDINATION
Continue Prolia  RTC in 6 months Name: Carli Reyes    : 1970    Discharge Date: 4/15/2022    Primary Auth/Cert #: [id number]    Destination: [address]    Discharge Medications:      Medication List      START taking these medications    amLODIPine 5 MG tablet  Commonly known as: NORVASC  Take 1 tablet by mouth daily  Start taking on: 2022     divalproex 250 MG extended release tablet  Commonly known as: DEPAKOTE ER  Take 3 tablets by mouth at bedtime     pantoprazole 40 MG tablet  Commonly known as: PROTONIX  Take 1 tablet by mouth every morning (before breakfast)  Start taking on: 2022     QUEtiapine 400 MG tablet  Commonly known as: SEROQUEL  Take 1 tablet by mouth nightly        CONTINUE taking these medications    gabapentin 100 MG capsule  Commonly known as: NEURONTIN  Take 1 capsule by mouth 2 times daily as needed (foot pain) for up to 12 doses. Intended supply: 30 days  Notes to patient: Nerve pain     ibuprofen 600 MG tablet  Commonly known as: ADVIL;MOTRIN  Take 1 tablet by mouth every 6 hours as needed for Pain  Notes to patient: Pain           Where to Get Your Medications      These medications were sent to [facility], [address], [address] [facility] [address]    Phone: [phone]   · amLODIPine 5 MG tablet  · divalproex 250 MG extended release tablet  · pantoprazole 40 MG tablet  · QUEtiapine 400 MG tablet     Pharmacy Instructions:     medications from [facility]           Follow Up Appointment: MD [address], [address]  [address] [facility] [address] [phone]    Schedule an appointment as soon as possible for a visit in 2 weeks  egd bx results    [address]  [address] [facility], [address] [place] [address] Drive  Phone: [phone]  Fax: [phone]  Please fax AVS  Go on 4/15/2022  Please send by [employer] cab service

## 2023-09-20 ENCOUNTER — APPOINTMENT (OUTPATIENT)
Dept: GENERAL RADIOLOGY | Age: 53
End: 2023-09-20
Payer: MEDICAID

## 2023-09-20 ENCOUNTER — HOSPITAL ENCOUNTER (OUTPATIENT)
Age: 53
Setting detail: OBSERVATION
Discharge: HOME | End: 2023-09-21
Attending: EMERGENCY MEDICINE | Admitting: EMERGENCY MEDICINE
Payer: MEDICAID

## 2023-09-20 DIAGNOSIS — R07.9 CHEST PAIN, UNSPECIFIED TYPE: Primary | ICD-10-CM

## 2023-09-20 DIAGNOSIS — M54.50 CHRONIC BILATERAL LOW BACK PAIN WITHOUT SCIATICA: ICD-10-CM

## 2023-09-20 DIAGNOSIS — G89.29 CHRONIC BILATERAL LOW BACK PAIN WITHOUT SCIATICA: ICD-10-CM

## 2023-09-20 DIAGNOSIS — E87.6 HYPOKALEMIA: ICD-10-CM

## 2023-09-20 LAB
ALBUMIN SERPL-MCNC: 3.8 G/DL (ref 3.5–5.2)
ALBUMIN/GLOB SERPL: 1.6 {RATIO} (ref 1–2.5)
ALP SERPL-CCNC: 74 U/L (ref 35–104)
ALT SERPL-CCNC: 11 U/L (ref 5–33)
ANION GAP SERPL CALCULATED.3IONS-SCNC: 6 MMOL/L (ref 9–17)
AST SERPL-CCNC: 15 U/L
BASOPHILS # BLD: 0.06 K/UL (ref 0–0.2)
BASOPHILS NFR BLD: 1 % (ref 0–2)
BILIRUB SERPL-MCNC: 0.6 MG/DL (ref 0.3–1.2)
BILIRUB UR QL STRIP: NEGATIVE
BUN SERPL-MCNC: 8 MG/DL (ref 6–20)
CALCIUM SERPL-MCNC: 8.5 MG/DL (ref 8.6–10.4)
CHLORIDE SERPL-SCNC: 106 MMOL/L (ref 98–107)
CLARITY UR: CLEAR
CO2 SERPL-SCNC: 26 MMOL/L (ref 20–31)
COLOR UR: YELLOW
COMMENT: NORMAL
CREAT SERPL-MCNC: 0.7 MG/DL (ref 0.5–0.9)
D DIMER PPP FEU-MCNC: <0.27 UG/ML FEU (ref 0–0.57)
EOSINOPHIL # BLD: 0.08 K/UL (ref 0–0.44)
EOSINOPHILS RELATIVE PERCENT: 1 % (ref 1–4)
ERYTHROCYTE [DISTWIDTH] IN BLOOD BY AUTOMATED COUNT: 12.9 % (ref 11.8–14.4)
GFR SERPL CREATININE-BSD FRML MDRD: >60 ML/MIN/1.73M2
GLUCOSE SERPL-MCNC: 115 MG/DL (ref 70–99)
GLUCOSE UR STRIP-MCNC: NEGATIVE MG/DL
HCT VFR BLD AUTO: 40.2 % (ref 36.3–47.1)
HGB BLD-MCNC: 13.2 G/DL (ref 11.9–15.1)
HGB UR QL STRIP.AUTO: NEGATIVE
IMM GRANULOCYTES # BLD AUTO: <0.03 K/UL (ref 0–0.3)
IMM GRANULOCYTES NFR BLD: 0 %
KETONES UR STRIP-MCNC: NEGATIVE MG/DL
LEUKOCYTE ESTERASE UR QL STRIP: NEGATIVE
LYMPHOCYTES NFR BLD: 2.88 K/UL (ref 1.1–3.7)
LYMPHOCYTES RELATIVE PERCENT: 42 % (ref 24–43)
MCH RBC QN AUTO: 33.8 PG (ref 25.2–33.5)
MCHC RBC AUTO-ENTMCNC: 32.8 G/DL (ref 28.4–34.8)
MCV RBC AUTO: 102.8 FL (ref 82.6–102.9)
MONOCYTES NFR BLD: 0.65 K/UL (ref 0.1–1.2)
MONOCYTES NFR BLD: 10 % (ref 3–12)
NEUTROPHILS NFR BLD: 46 % (ref 36–65)
NEUTS SEG NFR BLD: 3.11 K/UL (ref 1.5–8.1)
NITRITE UR QL STRIP: NEGATIVE
NRBC BLD-RTO: 0 PER 100 WBC
PH UR STRIP: 6.5 [PH] (ref 5–8)
PLATELET # BLD AUTO: 230 K/UL (ref 138–453)
PMV BLD AUTO: 9.6 FL (ref 8.1–13.5)
POTASSIUM SERPL-SCNC: 3.5 MMOL/L (ref 3.7–5.3)
PROT SERPL-MCNC: 6.2 G/DL (ref 6.4–8.3)
PROT UR STRIP-MCNC: NEGATIVE MG/DL
RBC # BLD AUTO: 3.91 M/UL (ref 3.95–5.11)
SODIUM SERPL-SCNC: 138 MMOL/L (ref 135–144)
SP GR UR STRIP: 1.01 (ref 1–1.03)
TROPONIN I SERPL HS-MCNC: 6 NG/L (ref 0–14)
TROPONIN I SERPL HS-MCNC: 7 NG/L (ref 0–14)
TSH SERPL DL<=0.05 MIU/L-ACNC: 1.74 UIU/ML (ref 0.3–5)
UROBILINOGEN UR STRIP-ACNC: NORMAL EU/DL (ref 0–1)
WBC OTHER # BLD: 6.8 K/UL (ref 3.5–11.3)

## 2023-09-20 PROCEDURE — 81003 URINALYSIS AUTO W/O SCOPE: CPT

## 2023-09-20 PROCEDURE — G0378 HOSPITAL OBSERVATION PER HR: HCPCS

## 2023-09-20 PROCEDURE — 6370000000 HC RX 637 (ALT 250 FOR IP): Performed by: EMERGENCY MEDICINE

## 2023-09-20 PROCEDURE — 85025 COMPLETE CBC W/AUTO DIFF WBC: CPT

## 2023-09-20 PROCEDURE — 84484 ASSAY OF TROPONIN QUANT: CPT

## 2023-09-20 PROCEDURE — 2580000003 HC RX 258: Performed by: EMERGENCY MEDICINE

## 2023-09-20 PROCEDURE — 99285 EMERGENCY DEPT VISIT HI MDM: CPT

## 2023-09-20 PROCEDURE — 80053 COMPREHEN METABOLIC PANEL: CPT

## 2023-09-20 PROCEDURE — 96374 THER/PROPH/DIAG INJ IV PUSH: CPT

## 2023-09-20 PROCEDURE — 84443 ASSAY THYROID STIM HORMONE: CPT

## 2023-09-20 PROCEDURE — 85379 FIBRIN DEGRADATION QUANT: CPT

## 2023-09-20 PROCEDURE — 71046 X-RAY EXAM CHEST 2 VIEWS: CPT

## 2023-09-20 PROCEDURE — 6360000002 HC RX W HCPCS: Performed by: EMERGENCY MEDICINE

## 2023-09-20 PROCEDURE — 93005 ELECTROCARDIOGRAM TRACING: CPT | Performed by: EMERGENCY MEDICINE

## 2023-09-20 RX ORDER — ONDANSETRON 4 MG/1
4 TABLET, ORALLY DISINTEGRATING ORAL EVERY 8 HOURS PRN
Status: DISCONTINUED | OUTPATIENT
Start: 2023-09-20 | End: 2023-09-21 | Stop reason: HOSPADM

## 2023-09-20 RX ORDER — POLYETHYLENE GLYCOL 3350 17 G/17G
17 POWDER, FOR SOLUTION ORAL DAILY PRN
Status: DISCONTINUED | OUTPATIENT
Start: 2023-09-20 | End: 2023-09-21 | Stop reason: HOSPADM

## 2023-09-20 RX ORDER — SODIUM CHLORIDE 0.9 % (FLUSH) 0.9 %
5-40 SYRINGE (ML) INJECTION PRN
Status: DISCONTINUED | OUTPATIENT
Start: 2023-09-20 | End: 2023-09-21 | Stop reason: HOSPADM

## 2023-09-20 RX ORDER — ACETAMINOPHEN 325 MG/1
650 TABLET ORAL EVERY 6 HOURS PRN
Status: DISCONTINUED | OUTPATIENT
Start: 2023-09-20 | End: 2023-09-21 | Stop reason: HOSPADM

## 2023-09-20 RX ORDER — ENOXAPARIN SODIUM 100 MG/ML
40 INJECTION SUBCUTANEOUS DAILY
Status: DISCONTINUED | OUTPATIENT
Start: 2023-09-20 | End: 2023-09-21 | Stop reason: HOSPADM

## 2023-09-20 RX ORDER — MAGNESIUM HYDROXIDE/ALUMINUM HYDROXICE/SIMETHICONE 120; 1200; 1200 MG/30ML; MG/30ML; MG/30ML
30 SUSPENSION ORAL ONCE
Status: COMPLETED | OUTPATIENT
Start: 2023-09-20 | End: 2023-09-20

## 2023-09-20 RX ORDER — SODIUM CHLORIDE 0.9 % (FLUSH) 0.9 %
5-40 SYRINGE (ML) INJECTION EVERY 12 HOURS SCHEDULED
Status: DISCONTINUED | OUTPATIENT
Start: 2023-09-20 | End: 2023-09-21 | Stop reason: HOSPADM

## 2023-09-20 RX ORDER — KETOROLAC TROMETHAMINE 30 MG/ML
30 INJECTION, SOLUTION INTRAMUSCULAR; INTRAVENOUS ONCE
Status: COMPLETED | OUTPATIENT
Start: 2023-09-20 | End: 2023-09-20

## 2023-09-20 RX ORDER — FAMOTIDINE 20 MG/1
20 TABLET, FILM COATED ORAL ONCE
Status: COMPLETED | OUTPATIENT
Start: 2023-09-20 | End: 2023-09-20

## 2023-09-20 RX ORDER — DIVALPROEX SODIUM 125 MG/1
450 TABLET, DELAYED RELEASE ORAL
Status: DISCONTINUED | OUTPATIENT
Start: 2023-09-20 | End: 2023-09-21 | Stop reason: HOSPADM

## 2023-09-20 RX ORDER — SODIUM CHLORIDE 9 MG/ML
INJECTION, SOLUTION INTRAVENOUS PRN
Status: DISCONTINUED | OUTPATIENT
Start: 2023-09-20 | End: 2023-09-21 | Stop reason: HOSPADM

## 2023-09-20 RX ORDER — AMLODIPINE BESYLATE 5 MG/1
5 TABLET ORAL DAILY
Status: DISCONTINUED | OUTPATIENT
Start: 2023-09-21 | End: 2023-09-21 | Stop reason: HOSPADM

## 2023-09-20 RX ORDER — PANTOPRAZOLE SODIUM 40 MG/1
40 TABLET, DELAYED RELEASE ORAL
Status: DISCONTINUED | OUTPATIENT
Start: 2023-09-21 | End: 2023-09-21 | Stop reason: HOSPADM

## 2023-09-20 RX ORDER — ONDANSETRON 2 MG/ML
4 INJECTION INTRAMUSCULAR; INTRAVENOUS EVERY 6 HOURS PRN
Status: DISCONTINUED | OUTPATIENT
Start: 2023-09-20 | End: 2023-09-21 | Stop reason: HOSPADM

## 2023-09-20 RX ORDER — ACETAMINOPHEN 650 MG/1
650 SUPPOSITORY RECTAL EVERY 6 HOURS PRN
Status: DISCONTINUED | OUTPATIENT
Start: 2023-09-20 | End: 2023-09-21 | Stop reason: HOSPADM

## 2023-09-20 RX ORDER — QUETIAPINE FUMARATE 200 MG/1
400 TABLET, FILM COATED ORAL NIGHTLY
Status: DISCONTINUED | OUTPATIENT
Start: 2023-09-20 | End: 2023-09-21 | Stop reason: HOSPADM

## 2023-09-20 RX ADMIN — DIVALPROEX SODIUM 500 MG: 125 TABLET, DELAYED RELEASE ORAL at 20:03

## 2023-09-20 RX ADMIN — FAMOTIDINE 20 MG: 20 TABLET, FILM COATED ORAL at 14:36

## 2023-09-20 RX ADMIN — POTASSIUM BICARBONATE 40 MEQ: 782 TABLET, EFFERVESCENT ORAL at 15:57

## 2023-09-20 RX ADMIN — ALUMINUM HYDROXIDE, MAGNESIUM HYDROXIDE, AND SIMETHICONE 30 ML: 200; 200; 20 SUSPENSION ORAL at 14:36

## 2023-09-20 RX ADMIN — SODIUM CHLORIDE, PRESERVATIVE FREE 10 ML: 5 INJECTION INTRAVENOUS at 20:03

## 2023-09-20 RX ADMIN — QUETIAPINE FUMARATE 400 MG: 200 TABLET ORAL at 20:03

## 2023-09-20 RX ADMIN — KETOROLAC TROMETHAMINE 30 MG: 30 INJECTION, SOLUTION INTRAMUSCULAR; INTRAVENOUS at 15:56

## 2023-09-20 ASSESSMENT — ENCOUNTER SYMPTOMS
VOMITING: 0
CONSTIPATION: 0
BACK PAIN: 1
SHORTNESS OF BREATH: 1
ABDOMINAL PAIN: 0
NAUSEA: 0

## 2023-09-20 ASSESSMENT — HEART SCORE: ECG: 0

## 2023-09-20 NOTE — ED PROVIDER NOTES
This patient was signed out to me by Dr. Montana Rubi at the completion of his shift. Patient apparently presented with complaints of intermittent chest pain and shortness of breath for the past 6 months as well as some upper back pain. Patient also reports apparently a 50 pound weight loss over the past year which was not intentional.  I have reviewed Dr. Zuñiga Space note. I reviewed the patient's chest x-ray and it appears to demonstrate increasing hyperinflation. No infiltrates or consolidation are noted. No pneumothorax or hemothorax are noted. I have reviewed the patient's ECG which demonstrates sinus bradycardia with evidence for left ventricular hypertrophy. Heart rate is 56. Compared to prior tracing dated 1 March 2022 there are no clinically significant neurologic changes noted. We are currently awaiting the results of the remainder of her lab work. Disposition has yet to be determined. Concetta Green MD  09/20/23 0888    I have reviewed the patient's lab results. Troponin is normal at 6. D-dimer is less than 0.27. Serum potassium is mildly depressed at 3.5 and glucose is mildly elevated at 115. Urinalysis is unremarkable. CBC is unremarkable. Impression: Chest pain of undetermined etiology. Plan: Patient will be admitted to the observation service for Cardiologic consultation and further evaluation and treatment.        Concetta Green MD  09/20/23 1647

## 2023-09-20 NOTE — ED PROVIDER NOTES
[address] [place] [address] ED  Emergency Department Encounter  Emergency Medicine Resident     Pt Name:Elaine Barber  MRN: [id number]  [address] [place] 1970  Date of evaluation: 9/20/23  PCP:  Ortiz Clements DTR  Note Started: 1:39 PM EDT      CHIEF COMPLAINT       Chief Complaint   Patient presents with    Chest Pain    Shortness of Breath       HISTORY OF PRESENT ILLNESS  (Location/Symptom, Timing/Onset, Context/Setting, Quality, Duration, Modifying Factors, Severity.)      Augusto Good is a 46 y.o. female who presents with chest pain she describes as pressure with associated shortness of breath. Patient states that when she swallows she feels like food gets stuck in her throat. She states that her chest pain has been going on intermittently for the last couple of weeks that is worse today. She describes pressure as somebody sitting on her chest that does not radiate. She denies fevers, chills, abdominal pain. She does endorse bilateral lower back pain that has been present for about a year. She denies injury to her back. She states about a month ago she had an episode of catie hematuria that resolved. She denies dysuria, increased frequency. She denies personal history of heart disease and is adopted and does not know her family history. She also states she has had a 50 pound weight loss over the last 6 months trying. Patient denies hx of IVDA or cancers. No saddle anesthesia. PAST MEDICAL / SURGICAL / SOCIAL / FAMILY HISTORY      has a past medical history of Psychiatric problem. has a past surgical history that includes Upper gastrointestinal endoscopy (N/A, 4/11/2022).       Social History     Socioeconomic History    Marital status:      Spouse name: Not on file    Number of children: Not on file    Years of education: Not on file    Highest education level: Not on file   Occupational History    Not on file   Tobacco Use    Smoking status: Every Day     Packs/day: 1     Types:

## 2023-09-21 VITALS
BODY MASS INDEX: 16.59 KG/M2 | WEIGHT: 112 LBS | HEIGHT: 69 IN | OXYGEN SATURATION: 97 % | DIASTOLIC BLOOD PRESSURE: 96 MMHG | SYSTOLIC BLOOD PRESSURE: 161 MMHG | TEMPERATURE: 97.7 F | HEART RATE: 74 BPM | RESPIRATION RATE: 18 BRPM

## 2023-09-21 PROBLEM — M54.50 CHRONIC BILATERAL LOW BACK PAIN WITHOUT SCIATICA: Status: ACTIVE | Noted: 2023-09-21

## 2023-09-21 PROBLEM — G89.29 CHRONIC BILATERAL LOW BACK PAIN WITHOUT SCIATICA: Status: ACTIVE | Noted: 2023-09-21

## 2023-09-21 PROBLEM — E87.6 HYPOKALEMIA: Status: ACTIVE | Noted: 2023-09-21

## 2023-09-21 LAB
ANION GAP SERPL CALCULATED.3IONS-SCNC: 7 MMOL/L (ref 9–17)
BUN SERPL-MCNC: 11 MG/DL (ref 6–20)
CALCIUM SERPL-MCNC: 8.9 MG/DL (ref 8.6–10.4)
CHLORIDE SERPL-SCNC: 108 MMOL/L (ref 98–107)
CO2 SERPL-SCNC: 27 MMOL/L (ref 20–31)
CREAT SERPL-MCNC: 0.8 MG/DL (ref 0.5–0.9)
EKG ATRIAL RATE: 56 BPM
EKG P AXIS: 58 DEGREES
EKG P-R INTERVAL: 140 MS
EKG Q-T INTERVAL: 438 MS
EKG QRS DURATION: 88 MS
EKG QTC CALCULATION (BAZETT): 422 MS
EKG R AXIS: 80 DEGREES
EKG T AXIS: 66 DEGREES
EKG VENTRICULAR RATE: 56 BPM
GFR SERPL CREATININE-BSD FRML MDRD: >60 ML/MIN/1.73M2
GLUCOSE SERPL-MCNC: 121 MG/DL (ref 70–99)
POTASSIUM SERPL-SCNC: 4.2 MMOL/L (ref 3.7–5.3)
SODIUM SERPL-SCNC: 142 MMOL/L (ref 135–144)

## 2023-09-21 PROCEDURE — 93010 ELECTROCARDIOGRAM REPORT: CPT | Performed by: INTERNAL MEDICINE

## 2023-09-21 PROCEDURE — 2580000003 HC RX 258: Performed by: EMERGENCY MEDICINE

## 2023-09-21 PROCEDURE — 6370000000 HC RX 637 (ALT 250 FOR IP): Performed by: EMERGENCY MEDICINE

## 2023-09-21 PROCEDURE — 80048 BASIC METABOLIC PNL TOTAL CA: CPT

## 2023-09-21 PROCEDURE — 36415 COLL VENOUS BLD VENIPUNCTURE: CPT

## 2023-09-21 PROCEDURE — G0378 HOSPITAL OBSERVATION PER HR: HCPCS

## 2023-09-21 PROCEDURE — 99223 1ST HOSP IP/OBS HIGH 75: CPT | Performed by: INTERNAL MEDICINE

## 2023-09-21 RX ADMIN — PANTOPRAZOLE SODIUM 40 MG: 40 TABLET, DELAYED RELEASE ORAL at 08:53

## 2023-09-21 RX ADMIN — SODIUM CHLORIDE, PRESERVATIVE FREE 10 ML: 5 INJECTION INTRAVENOUS at 08:53

## 2023-09-21 RX ADMIN — AMLODIPINE BESYLATE 5 MG: 5 TABLET ORAL at 08:53
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
## 2023-09-21 NOTE — H&P
[address]  CDU / OBSERVATION ENCOUNTER  RESIDENT NOTE     Pt Name: Trace Friend  MRN: [id number]  [facility] 1970  Date of evaluation: 9/21/23  Patient's PCP is :  Parul Tavera DTR    CHIEF COMPLAINT       Chief Complaint   Patient presents with    Chest Pain    Shortness of Breath         HISTORY OF PRESENT ILLNESS    Trace Friend is a 46 y.o. female who presents with chest pain she describes as pressure with associated shortness of breath. Patient states that when she swallows she feels like food gets stuck in her throat. She states that her chest pain has been going on intermittently for the last couple of weeks that is worse today. She describes pressure as somebody sitting on her chest that does not radiate. She denies fevers, chills, abdominal pain. She does endorse bilateral lower back pain that has been present for about a year. She denies injury to her back. She states about a month ago she had an episode of catie hematuria that resolved. She denies dysuria, increased frequency. She denies personal history of heart disease and is adopted and does not know her family history. She also states she has had a 50 pound weight loss over the last 6 months trying. Patient denies hx of IVDA or cancers. No saddle anesthesia. History was obtained in part through review of the ED chart. When possible, a direct discussion was had with ED nurses, residents, and attendings  REVIEW OF SYSTEMS       General ROS - No fevers, No malaise   Ophthalmic ROS - No discharge, No changes in vision  ENT ROS -  No sore throat, No rhinorrhea.  dysphagia  Respiratory ROS - no shortness of breath, no cough, no  wheezing  Cardiovascular ROS - chest pain, no dyspnea on exertion  Gastrointestinal ROS - No abdominal pain, no nausea or vomiting, no change in bowel habits, no black or bloody stools  Genito-Urinary ROS - No dysuria, trouble voiding, or hematuria  Musculoskeletal ROS - No myalgias, No

## 2023-09-21 NOTE — PROGRESS NOTES
[address]  CDU / OBSERVATION ENCOUNTER  ATTENDING NOTE       I performed a history and physical examination of the patient and discussed management with the resident or midlevel provider. I reviewed the resident or midlevel provider's note and agree with the documented findings and plan of care. Any areas of disagreement are noted on the chart. I was personally present for the key portions of any procedures. I have documented in the chart those procedures where I was not present during the key portions. I have reviewed the nurses notes. I agree with the chief complaint, past medical history, past surgical history, allergies, medications, social and family history as documented unless otherwise noted below. The Family history, social history, and ROS are effectively unchanged since admission unless noted elsewhere in the chart. This patient was placed in the observation unit for reevaluation for possible admission to the hospital    The patient is a 46year old female with history of polysubstance abuse who presented to the ED for evaluation of weight loss as well as acute on chronic chest pain and back pain. ER workup was grossly unremarkable. She was placed in the ETU for cardiology consult. Will await further recommendations.      [address] [facility], [address] [place] [address]  Attending Emergency  Physician

## 2023-09-21 NOTE — CONSULTS
[facility]   Consult Note         Today's Date: 9/21/2023  Patient Name: Trace Friend  Date of admission: 9/20/2023  1:37 PM  Patient's age: 46 y.o., 1970  Admission Dx: Hypokalemia [E87.6]  Chest pain [R07.9]  Chronic bilateral low back pain without sciatica [M54.50, G89.29]  Chest pain, unspecified type [R07.9]    Reason for Consult:  Cardiac evaluation    Requesting Physician: Mariusz Lackey MD    REASON FOR CONSULT:  chest pain    History Obtained From:  Patient, chart, staff, records    HISTORY OF PRESENT ILLNESS:      The patient is a 46 y.o. female who is admitted to the hospital for Chest Pain. Pt is coming from [address] [place] [address] where they did some EKGs that showed \"Swelling of one side of the heart\" after pt started having chest pain. This prompted visit to the ED. Elaine Polanco complains of chest pain. Onset was 2 weeks ago. Symptoms have been unchanged since that time. The patient's pain is intermittent. The patient describes the pain as heaviness, pressure, squeezing, tightness and  does not radiate . Associated symptoms are: chest pain, chest pressure/discomfort, dyspnea, and exertional chest pressure/discomfort. SOB has been ongoing or about 6 months. Aggravating factors are: deep inspiration and walking. Alleviating factors are: rest. Patient's cardiac risk factors are: smoking/ tobacco exposure. Previous cardiac testing: echocardiogram (sinus bradycardia with LVH). Additionally, pt complained of the sensation of difficulty swallowing, low back pain for 1 year, and hematuria 1 month ago while in the ED. Past Medical History:   has a past medical history of Psychiatric problem. Past Surgical History:   has a past surgical history that includes Upper gastrointestinal endoscopy (N/A, 4/11/2022). Home Medications:    Prior to Admission medications    Medication Sig Start Date End Date Taking?  Authorizing Provider   amLODIPine (NORVASC) 5 MG tablet Take 1 tablet by n/a      Labs:     CBC:   Recent Labs     09/20/23  1417   WBC 6.8   HGB 13.2   HCT 40.2        BMP:   Recent Labs     09/20/23  1417 09/21/23  0129    142   K 3.5* 4.2   CO2 26 27   BUN 8 11   CREATININE 0.7 0.8   LABGLOM >60 >60   GLUCOSE 115* 121*     BNP: No results for input(s): \"BNP\" in the last 72 hours. PT/INR: No results for input(s): \"PROTIME\", \"INR\" in the last 72 hours. APTT:No results for input(s): \"APTT\" in the last 72 hours. CARDIAC ENZYMES:No results for input(s): \"CKTOTAL\", \"CKMB\", \"CKMBINDEX\", \"TROPONINI\" in the last 72 hours. FASTING LIPID PANEL:  Lab Results   Component Value Date/Time    HDL 56 03/05/2021 12:25 PM    TRIG 106 03/05/2021 12:25 PM     LIVER PROFILE:  Recent Labs     09/20/23  1417   AST 15   ALT 11   LABALBU 3.8     Troponins: 7     Other Current Problems  Patient Active Problem List   Diagnosis    Depression with suicidal ideation    Cocaine abuse ([address])    Cannabis abuse    Bipolar I disorder, most recent episode depressed, severe without psychotic features ([address])    Severe protein-calorie malnutrition ([address])    Failure to thrive in adult    Adult body mass index less than 19    Other dysphagia    Chest pain           IMPRESSION & Recommendations:    Non cardiac chest pain; EKG showed no acute findings; likely COPD - can be discharged with out patient follow up for COPD management      Discussed with patient, family, and Nurse. Electronically signed by Jen Mora on 9/21/2023 at 7:39 AM      Attending Cardiologist Addendum: I have reviewed and performed the history, physical, subjective, objective, assessment, and plan with the resident/fellow/NP and agree with the note. I performed the history and physical personally. I have made changes to the note above as needed. Thank you for allowing me to participate in the care of this patient, please do not hesitate to call if you have any questions.     John Smith, [address] Cardiology

## 2023-09-21 NOTE — CARE COORDINATION
DISCHARGE PLANNING EVALUATION: OP/OBSERVATION        9/21/23, 12:22 PM EDT    [facility]         Location: OBS 20/20-1   Reason for hospitalization: Hypokalemia [E87.6]  Chest pain [R07.9]  Chronic bilateral low back pain without sciatica [M54.50, G89.29]  Chest pain, unspecified type [R07.9]     CM Services requested for transitional needs. PCP: Tonny Lay DTR    Transportation/Food Security/Housekeeping Addressed:  No issues identified. Equipment needs: none     Transition plan:  Patient reports that she is soon to be homeless and is asking for intervention. SW is consulted.

## 2023-09-21 NOTE — PROGRESS NOTES
Comprehensive Nutrition Assessment    Type and Reason for Visit:  Initial, Positive Nutrition Screen    Nutrition Recommendations/Plan:   Recommend Regular diet when able  Send Standard ONS BID with meals once diet advances     Malnutrition Assessment:  Malnutrition Status:  Insufficient data (09/21/23 1480)    Context:  Acute Illness     Findings of the 6 clinical characteristics of malnutrition:  Energy Intake:  Unable to assess  Weight Loss:  Unable to assess     Body Fat Loss:  Unable to assess     Muscle Mass Loss:  Unable to assess    Fluid Accumulation:  No significant fluid accumulation     Strength:  Not Performed    Nutrition Assessment:    Pt in observation for chest pain, hypokalemia and back pain. No H&P is available for review at this time for review, but Pt is noted to have history of \"psychiatric problem. \"  Physician notes show Pt has lost weight, 50# over the last year. However, records show only a mild weight loss in the last 5 months, going from 120# in April to #, a loss of 8# (6.7%) x 5 months. Pt is also noted to have swallowing difficulty, noting that food gets stuck in her throat. This writer attempted to speak with patient who was unhappy with her care at this time. Writer asked Pt about her weight loss, and Pt states \"I don't know why I lost weight. All you can do is feed me. \"  However, Pt is NPO at this time and not happy, stating she wants breakfast.  She did not answer any further questions or provide further information. Pt does appear frail and emaciated, but was mostly covered with blanket and so physical exam was unable to be performed. Pt would likely benefit from ONS once diet is advanced, standard BID. Nutrition Related Findings:    Meds/Labs reviewed Wound Type: None       Current Nutrition Intake & Therapies:    Average Meal Intake: NPO  Average Supplements Intake: NPO  ADULT DIET;  Regular    Anthropometric Measures:  Height: 5' 9.02\" (175.3 cm)  Ideal Body

## 2023-09-22 NOTE — DISCHARGE SUMMARY
CDU Discharge Summary        Patient:  Matheus Rubio  YOB: 1970    MRN: [id number]   Acct: [de-identified]    Primary Care Physician: Candida Quinteros DTR    Admit date:  9/20/2023  1:37 PM  Discharge date: 9/21/2023  2:36 PM     Discharge Diagnoses:     Acute chest pain due to COPD  Improved with rest    Follow-up:  Call today/tomorrow for a follow up appointment with Candida Quinteros DTR , or return to the Emergency Room with worsening symptoms    Stressed to patient the importance of following up with primary care doctor for further workup/management of symptoms. Pt verbalizes understanding and agrees with plan. Discharge Medications:  Changes to medications none           Medication List        CONTINUE taking these medications      amLODIPine 5 MG tablet  Commonly known as: NORVASC  Take 1 tablet by mouth daily     divalproex 250 MG extended release tablet  Commonly known as: DEPAKOTE ER  Take 3 tablets by mouth at bedtime     gabapentin 100 MG capsule  Commonly known as: NEURONTIN  Take 1 capsule by mouth 2 times daily as needed (foot pain) for up to 12 doses.  Intended supply: 30 days     ibuprofen 600 MG tablet  Commonly known as: ADVIL;MOTRIN  Take 1 tablet by mouth every 6 hours as needed for Pain     pantoprazole 40 MG tablet  Commonly known as: PROTONIX  Take 1 tablet by mouth every morning (before breakfast)     QUEtiapine 400 MG tablet  Commonly known as: SEROQUEL  Take 1 tablet by mouth nightly              Diet:  No diet orders on file , Advance as tolerated     Activity:  As tolerated    Consultants: IP CONSULT TO CARDIOLOGY  IP CONSULT TO SOCIAL WORK    Procedures:  Not indicated     Diagnostic Test:   Results for orders placed or performed during the hospital encounter of 09/20/23   CBC with Auto Differential   Result Value Ref Range    WBC 6.8 3.5 - 11.3 k/uL    RBC 3.91 (L) 3.95 - 5.11 m/uL    Hemoglobin 13.2 11.9 - 15.1 g/dL    Hematocrit 40.2 36.3 - 47.1 %    .8 82.6 - 102.9 fL based on chemical results unless requested in original order. D-Dimer, Quantitative   Result Value Ref Range    D-Dimer, Quant <0.27 0.00 - 0.57 ug/mL FEU   TSH with Reflex   Result Value Ref Range    TSH 1.74 0.30 - 5.00 uIU/mL   Basic Metabolic Panel   Result Value Ref Range    Sodium 142 135 - 144 mmol/L    Potassium 4.2 3.7 - 5.3 mmol/L    Chloride 108 (H) 98 - 107 mmol/L    CO2 27 20 - 31 mmol/L    Anion Gap 7 (L) 9 - 17 mmol/L    Glucose 121 (H) 70 - 99 mg/dL    BUN 11 6 - 20 mg/dL    Creatinine 0.8 0.5 - 0.9 mg/dL    Est, Glom Filt Rate >60 >60 mL/min/1.73m2    Calcium 8.9 8.6 - 10.4 mg/dL   EKG 12 Lead   Result Value Ref Range    Ventricular Rate 56 BPM    Atrial Rate 56 BPM    P-R Interval 140 ms    QRS Duration 88 ms    Q-T Interval 438 ms    QTc Calculation (Bazett) 422 ms    P Axis 58 degrees    R Axis 80 degrees    T Axis 66 degrees     XR CHEST (2 VW)    Result Date: 9/20/2023  EXAMINATION: TWO XRAY VIEWS OF THE CHEST 9/20/2023 2:35 pm COMPARISON: 03/01/2022 HISTORY: ORDERING SYSTEM PROVIDED HISTORY: chest pain, wt loss, smoker TECHNOLOGIST PROVIDED HISTORY: chest pain, wt loss, smoker FINDINGS: The lungs are without acute focal process. There is no effusion or pneumothorax. The cardiomediastinal silhouette is stable. The osseous structures are stable. No acute process. Physical Exam:    General appearance - NAD, AOx 3   Lungs -CTAB, no R/R/R  Heart - RRR, no M/R/G  Abdomen - Soft, NT/ND  Neurological:  MAEx4, No focal motor deficit, sensory loss  Extremities - Cap refil <2 sec in all ext., no edema  Skin -warm, dry      Hospital Course:  Clinical course has improved, labs and imaging reviewed. Tawnya Habermann originally presented to the hospital on 9/20/2023  1:37 PM. with chest pain. At that time it was determined that She required further observation and evaluation by cardiology. She was admitted and labs and imaging were followed daily. Imaging results as above.   She is

## 2024-04-20 ENCOUNTER — HOSPITAL ENCOUNTER (EMERGENCY)
Age: 54
Discharge: HOME | End: 2024-04-20
Attending: EMERGENCY MEDICINE
Payer: MEDICAID

## 2024-04-20 ENCOUNTER — APPOINTMENT (OUTPATIENT)
Dept: CT IMAGING | Age: 54
End: 2024-04-20
Payer: MEDICAID

## 2024-04-20 VITALS
HEART RATE: 69 BPM | OXYGEN SATURATION: 99 % | RESPIRATION RATE: 18 BRPM | SYSTOLIC BLOOD PRESSURE: 154 MMHG | TEMPERATURE: 97.8 F | DIASTOLIC BLOOD PRESSURE: 101 MMHG

## 2024-04-20 DIAGNOSIS — R10.9 FLANK PAIN: Primary | ICD-10-CM

## 2024-04-20 LAB
ALBUMIN SERPL-MCNC: 4.6 G/DL (ref 3.5–5.2)
ALBUMIN/GLOB SERPL: 2 {RATIO} (ref 1–2.5)
ALP SERPL-CCNC: 105 U/L (ref 35–104)
ALT SERPL-CCNC: 13 U/L (ref 10–35)
ANION GAP SERPL CALCULATED.3IONS-SCNC: 9 MMOL/L (ref 9–16)
AST SERPL-CCNC: 25 U/L (ref 10–35)
BASOPHILS # BLD: 0.1 K/UL (ref 0–0.2)
BASOPHILS NFR BLD: 1 % (ref 0–2)
BILIRUB SERPL-MCNC: 0.6 MG/DL (ref 0–1.2)
BILIRUB UR QL STRIP: NEGATIVE
BUN SERPL-MCNC: 12 MG/DL (ref 6–20)
CALCIUM SERPL-MCNC: 9.4 MG/DL (ref 8.6–10.4)
CHLORIDE SERPL-SCNC: 103 MMOL/L (ref 98–107)
CLARITY UR: CLEAR
CO2 SERPL-SCNC: 27 MMOL/L (ref 20–31)
COLOR UR: YELLOW
COMMENT: ABNORMAL
CREAT SERPL-MCNC: 0.8 MG/DL (ref 0.5–0.9)
EOSINOPHIL # BLD: 0.11 K/UL (ref 0–0.44)
EOSINOPHILS RELATIVE PERCENT: 1 % (ref 1–4)
ERYTHROCYTE [DISTWIDTH] IN BLOOD BY AUTOMATED COUNT: 11.9 % (ref 11.8–14.4)
GFR SERPL CREATININE-BSD FRML MDRD: 83 ML/MIN/1.73M2
GLUCOSE SERPL-MCNC: 94 MG/DL (ref 74–99)
GLUCOSE UR STRIP-MCNC: NEGATIVE MG/DL
HCT VFR BLD AUTO: 39 % (ref 36.3–47.1)
HGB BLD-MCNC: 13 G/DL (ref 11.9–15.1)
HGB UR QL STRIP.AUTO: NEGATIVE
IMM GRANULOCYTES # BLD AUTO: 0.06 K/UL (ref 0–0.3)
IMM GRANULOCYTES NFR BLD: 1 %
KETONES UR STRIP-MCNC: ABNORMAL MG/DL
LEUKOCYTE ESTERASE UR QL STRIP: NEGATIVE
LIPASE SERPL-CCNC: 32 U/L (ref 13–60)
LYMPHOCYTES NFR BLD: 1.73 K/UL (ref 1.1–3.7)
LYMPHOCYTES RELATIVE PERCENT: 14 % (ref 24–43)
MCH RBC QN AUTO: 34 PG (ref 25.2–33.5)
MCHC RBC AUTO-ENTMCNC: 33.3 G/DL (ref 28.4–34.8)
MCV RBC AUTO: 102.1 FL (ref 82.6–102.9)
MONOCYTES NFR BLD: 1.08 K/UL (ref 0.1–1.2)
MONOCYTES NFR BLD: 9 % (ref 3–12)
NEUTROPHILS NFR BLD: 74 % (ref 36–65)
NEUTS SEG NFR BLD: 9.57 K/UL (ref 1.5–8.1)
NITRITE UR QL STRIP: NEGATIVE
NRBC BLD-RTO: 0 PER 100 WBC
PH UR STRIP: 6 [PH] (ref 5–8)
PLATELET # BLD AUTO: 300 K/UL (ref 138–453)
PMV BLD AUTO: 9.3 FL (ref 8.1–13.5)
POTASSIUM SERPL-SCNC: 3.8 MMOL/L (ref 3.7–5.3)
PROT SERPL-MCNC: 7.2 G/DL (ref 6.6–8.7)
PROT UR STRIP-MCNC: NEGATIVE MG/DL
RBC # BLD AUTO: 3.82 M/UL (ref 3.95–5.11)
SODIUM SERPL-SCNC: 139 MMOL/L (ref 136–145)
SP GR UR STRIP: 1.02 (ref 1–1.03)
UROBILINOGEN UR STRIP-ACNC: NORMAL EU/DL (ref 0–1)
WBC OTHER # BLD: 12.7 K/UL (ref 3.5–11.3)

## 2024-04-20 PROCEDURE — 81003 URINALYSIS AUTO W/O SCOPE: CPT

## 2024-04-20 PROCEDURE — 80053 COMPREHEN METABOLIC PANEL: CPT

## 2024-04-20 PROCEDURE — 6360000004 HC RX CONTRAST MEDICATION

## 2024-04-20 PROCEDURE — 74177 CT ABD & PELVIS W/CONTRAST: CPT

## 2024-04-20 PROCEDURE — 96372 THER/PROPH/DIAG INJ SC/IM: CPT

## 2024-04-20 PROCEDURE — 83690 ASSAY OF LIPASE: CPT

## 2024-04-20 PROCEDURE — 85025 COMPLETE CBC W/AUTO DIFF WBC: CPT

## 2024-04-20 PROCEDURE — 6360000002 HC RX W HCPCS

## 2024-04-20 PROCEDURE — 99285 EMERGENCY DEPT VISIT HI MDM: CPT

## 2024-04-20 RX ORDER — KETOROLAC TROMETHAMINE 30 MG/ML
30 INJECTION, SOLUTION INTRAMUSCULAR; INTRAVENOUS ONCE
Status: COMPLETED | OUTPATIENT
Start: 2024-04-20 | End: 2024-04-20

## 2024-04-20 RX ORDER — ACETAMINOPHEN 325 MG/1
650 TABLET ORAL EVERY 6 HOURS PRN
Qty: 30 TABLET | Refills: 0 | Status: SHIPPED | OUTPATIENT
Start: 2024-04-20

## 2024-04-20 RX ADMIN — IOPAMIDOL 75 ML: 755 INJECTION, SOLUTION INTRAVENOUS at 13:25

## 2024-04-20 RX ADMIN — KETOROLAC TROMETHAMINE 30 MG: 30 INJECTION, SOLUTION INTRAMUSCULAR; INTRAVENOUS at 11:27

## 2024-04-20 ASSESSMENT — PAIN DESCRIPTION - DESCRIPTORS
DESCRIPTORS: DISCOMFORT
DESCRIPTORS: SHARP

## 2024-04-20 ASSESSMENT — PAIN - FUNCTIONAL ASSESSMENT
PAIN_FUNCTIONAL_ASSESSMENT: 0-10
PAIN_FUNCTIONAL_ASSESSMENT: 0-10

## 2024-04-20 ASSESSMENT — PAIN SCALES - GENERAL
PAINLEVEL_OUTOF10: 8
PAINLEVEL_OUTOF10: 9
PAINLEVEL_OUTOF10: 8
PAINLEVEL_OUTOF10: 9

## 2024-04-20 ASSESSMENT — PAIN DESCRIPTION - FREQUENCY: FREQUENCY: CONTINUOUS

## 2024-04-20 ASSESSMENT — ENCOUNTER SYMPTOMS
NAUSEA: 0
ABDOMINAL PAIN: 0
VOMITING: 0
SHORTNESS OF BREATH: 0
DIARRHEA: 0

## 2024-04-20 ASSESSMENT — PAIN DESCRIPTION - LOCATION
LOCATION: FLANK
LOCATION: FLANK

## 2024-04-20 ASSESSMENT — PAIN DESCRIPTION - PAIN TYPE: TYPE: CHRONIC PAIN

## 2024-04-20 ASSESSMENT — PAIN DESCRIPTION - ORIENTATION
ORIENTATION: LEFT
ORIENTATION: LEFT

## 2024-04-20 NOTE — ED PROVIDER NOTES
[facility] ED  Emergency Department Encounter  Emergency Medicine Resident     Pt Name:Elaine Polanco  MRN: [id number]  Birthdate 1970  Date of evaluation: 4/20/24  PCP:  Alayna Gupta DTR  Note Started: 2:47 PM EDT      CHIEF COMPLAINT       Chief Complaint   Patient presents with    Flank Pain       HISTORY OF PRESENT ILLNESS  (Location/Symptom, Timing/Onset, Context/Setting, Quality, Duration, Modifying Factors, Severity.)      Elaine Polanco is a 53 y.o. female who presents with flank pain.  Patient is brought in from [facility] where she is staying for alcohol abuse.  She states she has been clean since February.  Patient states she woke up with left-sided flank pain.  Denies any urinary frequency, urgency or dysuria.  Denies any vaginal pain, vaginal bleeding, vaginal discharge.  Denies any history of kidney stones.  States it feels like it is a pulled muscle.  Denies any nausea, vomiting, chest pain, shortness of breath or any other complaints.    PAST MEDICAL / SURGICAL / SOCIAL / FAMILY HISTORY      has a past medical history of Psychiatric problem.       has a past surgical history that includes Upper gastrointestinal endoscopy (N/A, 4/11/2022).      Social History     Socioeconomic History    Marital status:      Spouse name: Not on file    Number of children: Not on file    Years of education: Not on file    Highest education level: Not on file   Occupational History    Not on file   Tobacco Use    Smoking status: Every Day     Current packs/day: 1.00     Types: Cigarettes    Smokeless tobacco: Never   Substance and Sexual Activity    Alcohol use: Yes     Comment: socially    Drug use: Yes     Types: Marijuana (Weed)    Sexual activity: Not Currently   Other Topics Concern    Not on file   Social History Narrative    Not on file     Social Determinants of Health     Financial Resource Strain: Not on file   Food Insecurity: Not on file   Transportation Needs: Not on file    Physical Activity: Not on file   Stress: Not on file   Social Connections: Not on file   Intimate Partner Violence: Not on file   Housing Stability: Not on file       No family history on file.    Allergies:  Pcn [penicillins]    Home Medications:  Prior to Admission medications    Medication Sig Start Date End Date Taking? Authorizing Provider   acetaminophen (TYLENOL) 325 MG tablet Take 2 tablets by mouth every 6 hours as needed for Pain 4/20/24  Yes Fausto Randolph MD   amLODIPine (NORVASC) 5 MG tablet Take 1 tablet by mouth daily 4/16/22   Leon Estrada MD   divalproex (DEPAKOTE ER) 250 MG extended release tablet Take 3 tablets by mouth at bedtime 4/15/22   Leon Estrada MD   pantoprazole (PROTONIX) 40 MG tablet Take 1 tablet by mouth every morning (before breakfast) 4/16/22   Leon Estrada MD   QUEtiapine (SEROQUEL) 400 MG tablet Take 1 tablet by mouth nightly 4/15/22   Leon Estrada MD   gabapentin (NEURONTIN) 100 MG capsule Take 1 capsule by mouth 2 times daily as needed (foot pain) for up to 12 doses. Intended supply: 30 days 3/28/22 9/20/23  Daljit Hoover DO   ibuprofen (ADVIL;MOTRIN) 600 MG tablet Take 1 tablet by mouth every 6 hours as needed for Pain 3/27/22 4/3/22  Daljit Hoover DO         REVIEW OF SYSTEMS       Review of Systems   Constitutional:  Negative for chills and fever.   Respiratory:  Negative for shortness of breath.    Gastrointestinal:  Negative for abdominal pain, diarrhea, nausea and vomiting.   Genitourinary:  Positive for flank pain. Negative for decreased urine volume, difficulty urinating, dysuria, frequency, hematuria, pelvic pain, urgency, vaginal bleeding, vaginal discharge and vaginal pain.       PHYSICAL EXAM      INITIAL VITALS:   BP (!) 154/101   Pulse 69   Temp 97.8 °F (36.6 °C) (Oral)   Resp 18   SpO2 99%     Physical Exam  Constitutional:       Appearance: Normal appearance.   Cardiovascular:      Rate and Rhythm: Normal rate and regular

## 2024-04-20 NOTE — DISCHARGE INSTRUCTIONS
You were seen today in the emergency department for your flank pain.  We now feel you are safe for discharge home.  You may take 2 tabs of Tylenol or 6 hours needed for pain.    Please return to the emergency department immediately if develop any new or worsening concerns including chest pain, shortness of breath, abdominal pain, nausea, vomiting, diarrhea, weakness, loss consciousness, fever, chills, or any other concerns.    Please call your PCP and schedule appointment within the next 24 to 48 hours for follow-up.

## 2024-04-20 NOTE — ED PROVIDER NOTES
[facility] ED     Emergency Department     Faculty Attestation    I performed a history and physical examination of the patient and discussed management with the resident. I reviewed the resident’s note and agree with the documented findings and plan of care. Any areas of disagreement are noted on the chart. I was personally present for the key portions of any procedures. I have documented in the chart those procedures where I was not present during the key portions. I have reviewed the emergency nurses triage note. I agree with the chief complaint, past medical history, past surgical history, allergies, medications, social and family history as documented unless otherwise noted below. For Physician Assistant/ Nurse Practitioner cases/documentation I have personally evaluated this patient and have completed at least one if not all key elements of the E/M (history, physical exam, and MDM). Additional findings are as noted.    Note Started: 11:22 AM EDT    Patient here with sudden onset left flank pain began this morning.  No injury no trauma.  Mild increased urinary frequency but no dysuria no hematuria no history of stones or kidney infections.  No abdominal pain no nausea vomiting no vaginal bleeding or discharge status post hysterectomy.  On exam appears uncomfortable but nontoxic.  Does have focal left CVA tenderness no right.  Abdomen soft no tenderness no rebound or guarding no pulsatile mass.  Will check urinalysis if negative plan for workup and possible imaging      Critical Care     none    Bennett Horne MD, FACEP, FAAEM  Attending Emergency  Physician           Bennett Horne MD  04/20/24 1803

## 2024-04-20 NOTE — ED NOTES
Pt came into the ed via triage due to having left flank pain that started today   Pt has a hx of this pain   Pt has no other C/O at this time   Pt is Aox4 and ambulatory   RR are equal and regular

## 2024-09-10 ENCOUNTER — HOSPITAL ENCOUNTER (OUTPATIENT)
Age: 54
Discharge: HOME | End: 2024-09-12
Payer: MEDICAID

## 2024-09-10 ENCOUNTER — HOSPITAL ENCOUNTER (OUTPATIENT)
Dept: GENERAL RADIOLOGY | Age: 54
Discharge: HOME | End: 2024-09-12
Payer: MEDICAID

## 2024-09-10 DIAGNOSIS — M54.50 LUMBAR PAIN: ICD-10-CM

## 2024-09-10 DIAGNOSIS — R05.8 OTHER COUGH: ICD-10-CM

## 2024-09-10 PROCEDURE — 72100 X-RAY EXAM L-S SPINE 2/3 VWS: CPT

## 2024-09-10 PROCEDURE — 71046 X-RAY EXAM CHEST 2 VIEWS: CPT

## 2024-10-18 NOTE — BH NOTE
Detail Level: Detailed Patient presented to [email or web address] Quality 226: Preventive Care And Screening: Tobacco Use: Screening And Cessation Intervention: Patient screened for tobacco use and is an ex/non-smoker Quality 47: Advance Care Plan: Advance Care Planning discussed and documented in the medical record; patient did not wish or was not able to name a surrogate decision maker or provide an advance care plan. Quality 130: Documentation Of Current Medications In The Medical Record: Current Medications Documented

## 2024-12-18 ENCOUNTER — OFFICE VISIT (OUTPATIENT)
Dept: NEUROLOGY | Age: 54
End: 2024-12-18

## 2024-12-18 VITALS
HEART RATE: 76 BPM | SYSTOLIC BLOOD PRESSURE: 101 MMHG | WEIGHT: 153.8 LBS | DIASTOLIC BLOOD PRESSURE: 71 MMHG | HEIGHT: 69 IN | BODY MASS INDEX: 22.78 KG/M2

## 2024-12-18 DIAGNOSIS — R25.1 TREMOR: Primary | ICD-10-CM

## 2024-12-18 RX ORDER — CARBIDOPA AND LEVODOPA 25; 100 MG/1; MG/1
1 TABLET ORAL 3 TIMES DAILY
Qty: 90 TABLET | Refills: 3 | Status: SHIPPED | OUTPATIENT
Start: 2024-12-18

## 2024-12-18 RX ORDER — MIRTAZAPINE 30 MG/1
30 TABLET, FILM COATED ORAL NIGHTLY
COMMUNITY

## 2024-12-18 RX ORDER — ALBUTEROL SULFATE 90 UG/1
INHALANT RESPIRATORY (INHALATION)
COMMUNITY
Start: 2024-12-13

## 2024-12-18 RX ORDER — TRAZODONE HYDROCHLORIDE 50 MG/1
50 TABLET ORAL NIGHTLY
COMMUNITY
Start: 2024-12-09

## 2024-12-18 RX ORDER — FLUTICASONE FUROATE, UMECLIDINIUM BROMIDE AND VILANTEROL TRIFENATATE 200; 62.5; 25 UG/1; UG/1; UG/1
POWDER RESPIRATORY (INHALATION)
COMMUNITY
Start: 2024-12-13

## 2024-12-18 RX ORDER — IBUPROFEN 800 MG/1
800 TABLET, FILM COATED ORAL EVERY 6 HOURS PRN
Status: ON HOLD | COMMUNITY
End: 2025-02-19 | Stop reason: HOSPADM

## 2024-12-18 RX ORDER — PRAZOSIN HYDROCHLORIDE 1 MG/1
1 CAPSULE ORAL NIGHTLY
COMMUNITY

## 2024-12-18 RX ORDER — METOPROLOL TARTRATE 25 MG/1
25 TABLET, FILM COATED ORAL 2 TIMES DAILY
COMMUNITY
Start: 2024-12-13

## 2024-12-18 RX ORDER — RISPERIDONE 3 MG/1
3 TABLET ORAL DAILY
COMMUNITY

## 2024-12-18 RX ORDER — LITHIUM CARBONATE 300 MG/1
300 CAPSULE ORAL NIGHTLY
COMMUNITY
Start: 2024-12-05

## 2024-12-18 RX ORDER — HYDROXYZINE HYDROCHLORIDE 50 MG/1
50 TABLET, FILM COATED ORAL
COMMUNITY
Start: 2024-12-05

## 2024-12-18 NOTE — PROGRESS NOTES
[address], [address]  [place] [address]  P: [phone]  F: [phone]    NEUROLOGY CLINIC NOTE     PATIENT NAME: Elaine Polanco  PATIENT MRN: [id number]  PRIMARY CARE PHYSICIAN: Alayna Gupta DTR    HPI:      Elaine Polanco is a 54 y.o.   I had the pleasure of seeing your patient, Ms. Elaine Polanco, in neurology consultation for her symptoms of tremor. As you would recall, Ms. Polanco is a 54-year-old female with a past medical history significant for bipolar disorder, diabetes, and thyroid disease. She presented with complaints of dizziness, lightheadedness, and a persistent tremor.    Her symptoms reportedly began approximately one week ago in the setting of bronchitis. She was treated with a course of steroids, which she completed, but states that her symptoms never fully resolved. She continues to experience a productive cough with bright green sputum. Additionally, she describes an intermittent sensation of shakiness, which she attributes to a generalized feeling of weakness rather than an isolated limb tremor. There is no clear exacerbating or relieving factor identified, and no history of similar symptoms in the past.    Ms. Polanco is currently on multiple psychotropic medications, including Depakote (divalproex), Seroquel (quetiapine), and Risperdal (risperidone), in addition to a regimen that includes lithium, metoprolol, and carbidopa-levodopa. Given her medication profile, the possibility of drug-induced tremor, lithium toxicity, or an exacerbation of an underlying movement disorder was considered.    Her social history is notable for daily tobacco use, occasional vaping, and a prior history of marijuana use. She denies current alcohol or illicit drug use.     PATIENT HISTORY:     Past Medical History:   Diagnosis Date    Psychiatric problem         Past Surgical History:   Procedure Laterality Date    UPPER GASTROINTESTINAL ENDOSCOPY N/A

## 2025-02-11 ENCOUNTER — APPOINTMENT (OUTPATIENT)
Dept: GENERAL RADIOLOGY | Age: 55
End: 2025-02-11
Payer: MEDICAID

## 2025-02-11 ENCOUNTER — HOSPITAL ENCOUNTER (EMERGENCY)
Age: 55
Discharge: HOME | End: 2025-02-11
Attending: STUDENT IN AN ORGANIZED HEALTH CARE EDUCATION/TRAINING PROGRAM
Payer: MEDICAID

## 2025-02-11 VITALS
HEART RATE: 73 BPM | DIASTOLIC BLOOD PRESSURE: 88 MMHG | TEMPERATURE: 97.5 F | OXYGEN SATURATION: 100 % | RESPIRATION RATE: 18 BRPM | SYSTOLIC BLOOD PRESSURE: 112 MMHG

## 2025-02-11 DIAGNOSIS — J40 BRONCHITIS: Primary | ICD-10-CM

## 2025-02-11 PROCEDURE — 71046 X-RAY EXAM CHEST 2 VIEWS: CPT

## 2025-02-11 PROCEDURE — 99283 EMERGENCY DEPT VISIT LOW MDM: CPT

## 2025-02-11 PROCEDURE — 6370000000 HC RX 637 (ALT 250 FOR IP): Performed by: EMERGENCY MEDICINE

## 2025-02-11 RX ORDER — PREDNISONE 20 MG/1
60 TABLET ORAL ONCE
Status: COMPLETED | OUTPATIENT
Start: 2025-02-11 | End: 2025-02-11

## 2025-02-11 RX ORDER — PREDNISONE 50 MG/1
50 TABLET ORAL DAILY
Qty: 4 TABLET | Refills: 0 | Status: SHIPPED | OUTPATIENT
Start: 2025-02-12 | End: 2025-02-16

## 2025-02-11 RX ORDER — GUAIFENESIN 600 MG/1
600 TABLET, EXTENDED RELEASE ORAL ONCE
Status: COMPLETED | OUTPATIENT
Start: 2025-02-11 | End: 2025-02-11

## 2025-02-11 RX ORDER — GUAIFENESIN 600 MG/1
600 TABLET, EXTENDED RELEASE ORAL 2 TIMES DAILY
Qty: 30 TABLET | Refills: 0 | Status: SHIPPED | OUTPATIENT
Start: 2025-02-11 | End: 2025-02-26

## 2025-02-11 RX ADMIN — PREDNISONE 60 MG: 20 TABLET ORAL at 18:53

## 2025-02-11 RX ADMIN — GUAIFENESIN 600 MG: 600 TABLET ORAL at 18:53

## 2025-02-11 ASSESSMENT — LIFESTYLE VARIABLES
HOW MANY STANDARD DRINKS CONTAINING ALCOHOL DO YOU HAVE ON A TYPICAL DAY: PATIENT DOES NOT DRINK
HOW OFTEN DO YOU HAVE A DRINK CONTAINING ALCOHOL: NEVER

## 2025-02-11 NOTE — ED PROVIDER NOTES
[facility]     Emergency Department     Faculty Attestation    I performed a history and physical examination of the patient and discussed management with the resident. I have reviewed and agree with the resident’s findings including all diagnostic interpretations, and treatment plans as written at the time of my review. Any areas of disagreement are noted on the chart. I was personally present for the key portions of any procedures. I have documented in the chart those procedures where I was not present during the key portions. For Physician Assistant/ Nurse Practitioner cases/documentation I have personally evaluated this patient and have completed at least one if not all key elements of the E/M (history, physical exam, and MDM). Additional findings are as noted.    PtName: Elaine Polanco  MRN: [id number]  Birthdate 1970  Date of evaluation: 2/11/25  Note Started: 6:39 PM EST    Primary Care Physician: Alayna Gupta DTR    Brief HPI:  54-year-old female presents to the emergency department with history of URI symptoms and cough.  Symptoms started about 3 weeks ago, she has persistent cough.  40-pack-year smoking history, no formal diagnosis of COPD    Pertinent Physical Exam Findings:  Vitals:    02/11/25 1612   BP: 112/88   Pulse: 73   Resp: 18   Temp: 97.5 °F (36.4 °C)   SpO2: 100%   Appears well, resting comfortably, no acute distress, bilateral rhonchi and wheezing.    Medical Decision Making: Patient is a 54 y.o. female presenting to the emergency department with persistent cough. The chart was reviewed for pertinent history relating to the chief complaint.  The patient appears well at this time, no acute distress, vitals are stable.  Likely exacerbation of underlying undiagnosed COPD, plan to administer DuoNeb treatments and give oral prednisone.  Also plan to obtain chest x-ray to evaluate for lobar pneumonia..     All results, including labs (if ordered),

## 2025-02-11 NOTE — ED PROVIDER NOTES
[facility] EMERGENCY DEPARTMENT  Emergency Department Encounter  Emergency Medicine Resident     Pt Name:Elaine Polanco  MRN: [id number]  Birthdate 1970  Date of evaluation: 2/11/25  PCP:  Alayna Gupta DTR  Note Started: 6:38 PM EST      CHIEF COMPLAINT       Chief Complaint   Patient presents with    Cough       HISTORY OF PRESENT ILLNESS  (Location/Symptom, Timing/Onset, Context/Setting, Quality, Duration, Modifying Factors, Severity.)      Elaine Polanco is a 54 y.o. female who presents with cough for the last 2 weeks.  Patient states that 2 weeks ago she developed URI symptoms with fever, body aches, congestion.  Symptoms resolved except for the cough lingered.  She is staying at [facility] and they sent her over for evaluation today.  She is a smoker.  She states she has never been told she has COPD however was prescribed inhalers a couple of months ago.  She has been using the inhalers without improvement.  She has not taken anything else over-the-counter because she is concerned that it will make her urinalysis positive that she has performed at the [facility].  She states cough has been nonproductive but she feels like she needs to bring up phlegm but is unable to.    PAST MEDICAL / SURGICAL / SOCIAL / FAMILY HISTORY      has a past medical history of Psychiatric problem.       has a past surgical history that includes Upper gastrointestinal endoscopy (N/A, 4/11/2022).      Social History     Socioeconomic History    Marital status:      Spouse name: Not on file    Number of children: Not on file    Years of education: Not on file    Highest education level: Not on file   Occupational History    Not on file   Tobacco Use    Smoking status: Every Day     Current packs/day: 1.00     Types: Cigarettes    Smokeless tobacco: Never   Vaping Use    Vaping status: Some Days   Substance and Sexual Activity    Alcohol use: Not Currently     Comment: socially    Drug use: Not Currently     Types:  Hemostasis: Aluminum Chloride

## 2025-02-11 NOTE — ED NOTES
Pt to ed via ambulatory to room with complaints of a cough ongoing for the past 2 weeks   Pt denies any recent exposure to anyone sick that she knows of  Pt states she is unable to get up anything when coughing  Pt denies taking anything for meds   Pt states she is a daily smoker  Pt states she uses an inhaler that was prescribed about 3 months ago  Pt states [facility]  Pt alert and oriented x4, talking in complete sentences, respirations even and unlabored. Pt acting age appropriate. White board updated, will continue to plan of care

## 2025-02-12 NOTE — DISCHARGE INSTRUCTIONS
You were seen today for cough.  I gave you Mucinex and and dose of prednisone while you are here.  I sent prednisone and Mucinex to your pharmacy.  Your chest x-ray was clear.  You need to quit smoking.    If you notice any concerning symptoms please return to the ER immediately. These can include but are not limited to: fevers, chills, shortness of breath, vomiting, weakness of the extremities, changes in your mental status, numbness, pale extremities, or chest pain.     Wound care: none    Diet: You may resume your normal diet     Activity: resume activity as tolerated.     Medications: Continue taking your home medications as previously directed. For pain You may take tylenol 1,000mg by mouth every 6 hours as needed for pain. Do not exceed 4,000mg per day. If you have liver disease don't take tylenol. You may also take ibuprofen 600mg every 6-8 hours as needed for pain. Do not exceed 2,400 mg per day. If you experience stomach pain or you have a history of kidney disease stop taking ibuprofen. You may alternate application of ice and heat 20 minutes at a time as desired.      Follow up: Please follow up with your primary care doctor within one week or as needed.

## 2025-02-17 ENCOUNTER — APPOINTMENT (OUTPATIENT)
Dept: CT IMAGING | Age: 55
End: 2025-02-17
Payer: MEDICAID

## 2025-02-17 ENCOUNTER — APPOINTMENT (OUTPATIENT)
Dept: ULTRASOUND IMAGING | Age: 55
End: 2025-02-17
Payer: MEDICAID

## 2025-02-17 ENCOUNTER — APPOINTMENT (OUTPATIENT)
Dept: GENERAL RADIOLOGY | Age: 55
End: 2025-02-17
Payer: MEDICAID

## 2025-02-17 ENCOUNTER — HOSPITAL ENCOUNTER (OUTPATIENT)
Age: 55
Setting detail: OBSERVATION
Discharge: NURSING, REHAB OR LONG-TERM CARE | End: 2025-02-19
Attending: EMERGENCY MEDICINE | Admitting: FAMILY MEDICINE
Payer: MEDICAID

## 2025-02-17 DIAGNOSIS — N17.9 AKI (ACUTE KIDNEY INJURY): ICD-10-CM

## 2025-02-17 DIAGNOSIS — J18.9 PNEUMONIA DUE TO INFECTIOUS ORGANISM, UNSPECIFIED LATERALITY, UNSPECIFIED PART OF LUNG: Primary | ICD-10-CM

## 2025-02-17 PROBLEM — J44.9 COPD (CHRONIC OBSTRUCTIVE PULMONARY DISEASE) (HCC): Status: ACTIVE | Noted: 2025-02-17

## 2025-02-17 PROBLEM — R42 DIZZINESS: Status: ACTIVE | Noted: 2025-02-17

## 2025-02-17 PROBLEM — R93.89 ABNORMAL CHEST X-RAY: Status: ACTIVE | Noted: 2025-02-17

## 2025-02-17 PROBLEM — F43.10 PTSD (POST-TRAUMATIC STRESS DISORDER): Status: ACTIVE | Noted: 2025-02-17

## 2025-02-17 PROBLEM — E11.9 DIABETES MELLITUS TYPE 2, CONTROLLED (HCC): Status: ACTIVE | Noted: 2025-02-17

## 2025-02-17 PROBLEM — E86.0 DEHYDRATION: Status: ACTIVE | Noted: 2025-02-17

## 2025-02-17 PROBLEM — R63.6 UNDERWEIGHT: Status: ACTIVE | Noted: 2025-02-17

## 2025-02-17 PROBLEM — E03.9 HYPOTHYROIDISM: Status: ACTIVE | Noted: 2025-02-17

## 2025-02-17 PROBLEM — F31.9 BIPOLAR DISORDER (HCC): Status: ACTIVE | Noted: 2025-02-17

## 2025-02-17 PROBLEM — K21.9 GERD (GASTROESOPHAGEAL REFLUX DISEASE): Status: ACTIVE | Noted: 2025-02-17

## 2025-02-17 LAB
ANION GAP SERPL CALCULATED.3IONS-SCNC: 8 MMOL/L (ref 9–16)
B PARAP IS1001 DNA NPH QL NAA+NON-PROBE: NOT DETECTED
B PERT DNA SPEC QL NAA+PROBE: NOT DETECTED
BASOPHILS # BLD: 0.09 K/UL (ref 0–0.2)
BASOPHILS NFR BLD: 1 % (ref 0–2)
BILIRUB UR QL STRIP: NEGATIVE
BUN SERPL-MCNC: 19 MG/DL (ref 6–20)
C PNEUM DNA NPH QL NAA+NON-PROBE: NOT DETECTED
CALCIUM SERPL-MCNC: 9.6 MG/DL (ref 8.6–10.4)
CHLORIDE SERPL-SCNC: 104 MMOL/L (ref 98–107)
CLARITY UR: CLEAR
CO2 SERPL-SCNC: 24 MMOL/L (ref 20–31)
COLOR UR: YELLOW
COMMENT: NORMAL
CREAT SERPL-MCNC: 1.4 MG/DL (ref 0.6–0.9)
EOSINOPHIL # BLD: 0.53 K/UL (ref 0–0.44)
EOSINOPHILS RELATIVE PERCENT: 3 % (ref 1–4)
ERYTHROCYTE [DISTWIDTH] IN BLOOD BY AUTOMATED COUNT: 12.2 % (ref 11.8–14.4)
FLUAV RNA NPH QL NAA+NON-PROBE: NOT DETECTED
FLUBV RNA NPH QL NAA+NON-PROBE: NOT DETECTED
GFR, ESTIMATED: 44 ML/MIN/1.73M2
GLUCOSE SERPL-MCNC: 140 MG/DL (ref 74–99)
GLUCOSE UR STRIP-MCNC: NEGATIVE MG/DL
HADV DNA NPH QL NAA+NON-PROBE: NOT DETECTED
HCOV 229E RNA NPH QL NAA+NON-PROBE: NOT DETECTED
HCOV HKU1 RNA NPH QL NAA+NON-PROBE: NOT DETECTED
HCOV NL63 RNA NPH QL NAA+NON-PROBE: NOT DETECTED
HCOV OC43 RNA NPH QL NAA+NON-PROBE: NOT DETECTED
HCT VFR BLD AUTO: 39.4 % (ref 36.3–47.1)
HGB BLD-MCNC: 13.1 G/DL (ref 11.9–15.1)
HGB UR QL STRIP.AUTO: NEGATIVE
HMPV RNA NPH QL NAA+NON-PROBE: NOT DETECTED
HPIV1 RNA NPH QL NAA+NON-PROBE: NOT DETECTED
HPIV2 RNA NPH QL NAA+NON-PROBE: NOT DETECTED
HPIV3 RNA NPH QL NAA+NON-PROBE: NOT DETECTED
HPIV4 RNA NPH QL NAA+NON-PROBE: NOT DETECTED
IMM GRANULOCYTES # BLD AUTO: 0.13 K/UL (ref 0–0.3)
IMM GRANULOCYTES NFR BLD: 1 %
KETONES UR STRIP-MCNC: NEGATIVE MG/DL
L PNEUMO1 AG UR QL IA.RAPID: NEGATIVE
LACTIC ACID, SEPSIS WHOLE BLOOD: 1.4 MMOL/L (ref 0.5–1.9)
LEUKOCYTE ESTERASE UR QL STRIP: NEGATIVE
LYMPHOCYTES NFR BLD: 2.42 K/UL (ref 1.1–3.7)
LYMPHOCYTES RELATIVE PERCENT: 13 % (ref 24–43)
M PNEUMO DNA NPH QL NAA+NON-PROBE: NOT DETECTED
MCH RBC QN AUTO: 32.7 PG (ref 25.2–33.5)
MCHC RBC AUTO-ENTMCNC: 33.2 G/DL (ref 28.4–34.8)
MCV RBC AUTO: 98.3 FL (ref 82.6–102.9)
MONOCYTES NFR BLD: 0.92 K/UL (ref 0.1–1.2)
MONOCYTES NFR BLD: 5 % (ref 3–12)
NEUTROPHILS NFR BLD: 77 % (ref 36–65)
NEUTS SEG NFR BLD: 14.11 K/UL (ref 1.5–8.1)
NITRITE UR QL STRIP: NEGATIVE
NRBC BLD-RTO: 0 PER 100 WBC
PH UR STRIP: 7 [PH] (ref 5–8)
PLATELET # BLD AUTO: 442 K/UL (ref 138–453)
PMV BLD AUTO: 9.1 FL (ref 8.1–13.5)
POTASSIUM SERPL-SCNC: 3.8 MMOL/L (ref 3.7–5.3)
PROT UR STRIP-MCNC: NEGATIVE MG/DL
RBC # BLD AUTO: 4.01 M/UL (ref 3.95–5.11)
RSV RNA NPH QL NAA+NON-PROBE: NOT DETECTED
RV+EV RNA NPH QL NAA+NON-PROBE: NOT DETECTED
S PNEUM AG SPEC QL: NEGATIVE
SARS-COV-2 RNA NPH QL NAA+NON-PROBE: NOT DETECTED
SODIUM SERPL-SCNC: 136 MMOL/L (ref 136–145)
SP GR UR STRIP: 1.01 (ref 1–1.03)
SPECIMEN DESCRIPTION: NORMAL
SPECIMEN SOURCE: NORMAL
UROBILINOGEN UR STRIP-ACNC: NORMAL EU/DL (ref 0–1)
WBC OTHER # BLD: 18.2 K/UL (ref 3.5–11.3)

## 2025-02-17 PROCEDURE — G0378 HOSPITAL OBSERVATION PER HR: HCPCS

## 2025-02-17 PROCEDURE — 2580000003 HC RX 258: Performed by: EMERGENCY MEDICINE

## 2025-02-17 PROCEDURE — 96361 HYDRATE IV INFUSION ADD-ON: CPT

## 2025-02-17 PROCEDURE — 2580000003 HC RX 258

## 2025-02-17 PROCEDURE — 96374 THER/PROPH/DIAG INJ IV PUSH: CPT

## 2025-02-17 PROCEDURE — 2500000003 HC RX 250 WO HCPCS: Performed by: NURSE PRACTITIONER

## 2025-02-17 PROCEDURE — 81003 URINALYSIS AUTO W/O SCOPE: CPT

## 2025-02-17 PROCEDURE — 80048 BASIC METABOLIC PNL TOTAL CA: CPT

## 2025-02-17 PROCEDURE — 6370000000 HC RX 637 (ALT 250 FOR IP): Performed by: NURSE PRACTITIONER

## 2025-02-17 PROCEDURE — 99285 EMERGENCY DEPT VISIT HI MDM: CPT

## 2025-02-17 PROCEDURE — 6360000002 HC RX W HCPCS: Performed by: EMERGENCY MEDICINE

## 2025-02-17 PROCEDURE — 96365 THER/PROPH/DIAG IV INF INIT: CPT

## 2025-02-17 PROCEDURE — 96375 TX/PRO/DX INJ NEW DRUG ADDON: CPT

## 2025-02-17 PROCEDURE — 87899 AGENT NOS ASSAY W/OPTIC: CPT

## 2025-02-17 PROCEDURE — 2580000003 HC RX 258: Performed by: NURSE PRACTITIONER

## 2025-02-17 PROCEDURE — 96366 THER/PROPH/DIAG IV INF ADDON: CPT

## 2025-02-17 PROCEDURE — 71046 X-RAY EXAM CHEST 2 VIEWS: CPT

## 2025-02-17 PROCEDURE — 83605 ASSAY OF LACTIC ACID: CPT

## 2025-02-17 PROCEDURE — 99222 1ST HOSP IP/OBS MODERATE 55: CPT | Performed by: NURSE PRACTITIONER

## 2025-02-17 PROCEDURE — 6360000002 HC RX W HCPCS: Performed by: NURSE PRACTITIONER

## 2025-02-17 PROCEDURE — 96372 THER/PROPH/DIAG INJ SC/IM: CPT

## 2025-02-17 PROCEDURE — 87040 BLOOD CULTURE FOR BACTERIA: CPT

## 2025-02-17 PROCEDURE — 93005 ELECTROCARDIOGRAM TRACING: CPT | Performed by: STUDENT IN AN ORGANIZED HEALTH CARE EDUCATION/TRAINING PROGRAM

## 2025-02-17 PROCEDURE — 76770 US EXAM ABDO BACK WALL COMP: CPT

## 2025-02-17 PROCEDURE — 85025 COMPLETE CBC W/AUTO DIFF WBC: CPT

## 2025-02-17 PROCEDURE — 70450 CT HEAD/BRAIN W/O DYE: CPT

## 2025-02-17 PROCEDURE — 87449 NOS EACH ORGANISM AG IA: CPT

## 2025-02-17 PROCEDURE — 2500000003 HC RX 250 WO HCPCS: Performed by: EMERGENCY MEDICINE

## 2025-02-17 PROCEDURE — 0202U NFCT DS 22 TRGT SARS-COV-2: CPT

## 2025-02-17 RX ORDER — ACETAMINOPHEN 650 MG/1
650 SUPPOSITORY RECTAL EVERY 6 HOURS PRN
Status: DISCONTINUED | OUTPATIENT
Start: 2025-02-17 | End: 2025-02-19 | Stop reason: HOSPADM

## 2025-02-17 RX ORDER — SODIUM CHLORIDE 9 MG/ML
INJECTION, SOLUTION INTRAVENOUS CONTINUOUS
Status: ACTIVE | OUTPATIENT
Start: 2025-02-17 | End: 2025-02-18

## 2025-02-17 RX ORDER — IPRATROPIUM BROMIDE AND ALBUTEROL SULFATE 2.5; .5 MG/3ML; MG/3ML
1 SOLUTION RESPIRATORY (INHALATION)
Status: DISCONTINUED | OUTPATIENT
Start: 2025-02-18 | End: 2025-02-18

## 2025-02-17 RX ORDER — NICOTINE 21 MG/24HR
1 PATCH, TRANSDERMAL 24 HOURS TRANSDERMAL DAILY
Status: DISCONTINUED | OUTPATIENT
Start: 2025-02-17 | End: 2025-02-17

## 2025-02-17 RX ORDER — ONDANSETRON 2 MG/ML
4 INJECTION INTRAMUSCULAR; INTRAVENOUS EVERY 6 HOURS PRN
Status: DISCONTINUED | OUTPATIENT
Start: 2025-02-17 | End: 2025-02-19 | Stop reason: HOSPADM

## 2025-02-17 RX ORDER — ACETAMINOPHEN 325 MG/1
650 TABLET ORAL EVERY 6 HOURS PRN
Status: DISCONTINUED | OUTPATIENT
Start: 2025-02-17 | End: 2025-02-19 | Stop reason: HOSPADM

## 2025-02-17 RX ORDER — TRAZODONE HYDROCHLORIDE 50 MG/1
50 TABLET ORAL NIGHTLY
Status: DISCONTINUED | OUTPATIENT
Start: 2025-02-17 | End: 2025-02-19 | Stop reason: HOSPADM

## 2025-02-17 RX ORDER — PANTOPRAZOLE SODIUM 40 MG/1
40 TABLET, DELAYED RELEASE ORAL
Status: DISCONTINUED | OUTPATIENT
Start: 2025-02-18 | End: 2025-02-19 | Stop reason: HOSPADM

## 2025-02-17 RX ORDER — PRAZOSIN HYDROCHLORIDE 1 MG/1
1 CAPSULE ORAL NIGHTLY
Status: DISCONTINUED | OUTPATIENT
Start: 2025-02-17 | End: 2025-02-19 | Stop reason: HOSPADM

## 2025-02-17 RX ORDER — TRAZODONE HYDROCHLORIDE 50 MG/1
50 TABLET ORAL NIGHTLY PRN
Status: DISCONTINUED | OUTPATIENT
Start: 2025-02-17 | End: 2025-02-19 | Stop reason: HOSPADM

## 2025-02-17 RX ORDER — QUETIAPINE FUMARATE 200 MG/1
200 TABLET, FILM COATED ORAL 3 TIMES DAILY
Status: DISCONTINUED | OUTPATIENT
Start: 2025-02-17 | End: 2025-02-19 | Stop reason: HOSPADM

## 2025-02-17 RX ORDER — GUAIFENESIN 600 MG/1
600 TABLET, EXTENDED RELEASE ORAL 2 TIMES DAILY
Status: DISCONTINUED | OUTPATIENT
Start: 2025-02-17 | End: 2025-02-19 | Stop reason: HOSPADM

## 2025-02-17 RX ORDER — BUDESONIDE AND FORMOTEROL FUMARATE DIHYDRATE 160; 4.5 UG/1; UG/1
2 AEROSOL RESPIRATORY (INHALATION)
Status: DISCONTINUED | OUTPATIENT
Start: 2025-02-17 | End: 2025-02-19 | Stop reason: HOSPADM

## 2025-02-17 RX ORDER — 0.9 % SODIUM CHLORIDE 0.9 %
1000 INTRAVENOUS SOLUTION INTRAVENOUS ONCE
Status: COMPLETED | OUTPATIENT
Start: 2025-02-17 | End: 2025-02-17

## 2025-02-17 RX ORDER — IPRATROPIUM BROMIDE AND ALBUTEROL SULFATE 2.5; .5 MG/3ML; MG/3ML
1 SOLUTION RESPIRATORY (INHALATION) EVERY 4 HOURS PRN
Status: DISCONTINUED | OUTPATIENT
Start: 2025-02-17 | End: 2025-02-17

## 2025-02-17 RX ORDER — GABAPENTIN 100 MG/1
100 CAPSULE ORAL 3 TIMES DAILY
Status: DISCONTINUED | OUTPATIENT
Start: 2025-02-17 | End: 2025-02-19 | Stop reason: HOSPADM

## 2025-02-17 RX ORDER — PREDNISONE 20 MG/1
20 TABLET ORAL DAILY
Status: DISCONTINUED | OUTPATIENT
Start: 2025-02-17 | End: 2025-02-19 | Stop reason: HOSPADM

## 2025-02-17 RX ORDER — BENZONATATE 100 MG/1
100 CAPSULE ORAL 3 TIMES DAILY PRN
Status: DISCONTINUED | OUTPATIENT
Start: 2025-02-17 | End: 2025-02-19 | Stop reason: HOSPADM

## 2025-02-17 RX ORDER — ONDANSETRON 4 MG/1
4 TABLET, ORALLY DISINTEGRATING ORAL EVERY 8 HOURS PRN
Status: DISCONTINUED | OUTPATIENT
Start: 2025-02-17 | End: 2025-02-19 | Stop reason: HOSPADM

## 2025-02-17 RX ORDER — SODIUM CHLORIDE 9 MG/ML
INJECTION, SOLUTION INTRAVENOUS PRN
Status: DISCONTINUED | OUTPATIENT
Start: 2025-02-17 | End: 2025-02-19 | Stop reason: HOSPADM

## 2025-02-17 RX ORDER — SODIUM CHLORIDE 0.9 % (FLUSH) 0.9 %
5-40 SYRINGE (ML) INJECTION EVERY 12 HOURS SCHEDULED
Status: DISCONTINUED | OUTPATIENT
Start: 2025-02-17 | End: 2025-02-19 | Stop reason: HOSPADM

## 2025-02-17 RX ORDER — 0.9 % SODIUM CHLORIDE 0.9 %
30 INTRAVENOUS SOLUTION INTRAVENOUS ONCE
Status: DISCONTINUED | OUTPATIENT
Start: 2025-02-17 | End: 2025-02-17

## 2025-02-17 RX ORDER — HEPARIN SODIUM 5000 [USP'U]/ML
5000 INJECTION, SOLUTION INTRAVENOUS; SUBCUTANEOUS EVERY 8 HOURS SCHEDULED
Status: DISCONTINUED | OUTPATIENT
Start: 2025-02-17 | End: 2025-02-19 | Stop reason: HOSPADM

## 2025-02-17 RX ORDER — METOPROLOL TARTRATE 25 MG/1
25 TABLET, FILM COATED ORAL 2 TIMES DAILY
Status: DISCONTINUED | OUTPATIENT
Start: 2025-02-17 | End: 2025-02-19 | Stop reason: HOSPADM

## 2025-02-17 RX ORDER — HYDROXYZINE HYDROCHLORIDE 25 MG/1
50 TABLET, FILM COATED ORAL 3 TIMES DAILY PRN
Status: DISCONTINUED | OUTPATIENT
Start: 2025-02-17 | End: 2025-02-19 | Stop reason: HOSPADM

## 2025-02-17 RX ORDER — SODIUM CHLORIDE 0.9 % (FLUSH) 0.9 %
5-40 SYRINGE (ML) INJECTION PRN
Status: DISCONTINUED | OUTPATIENT
Start: 2025-02-17 | End: 2025-02-19 | Stop reason: HOSPADM

## 2025-02-17 RX ORDER — POLYETHYLENE GLYCOL 3350 17 G/17G
17 POWDER, FOR SOLUTION ORAL DAILY PRN
Status: DISCONTINUED | OUTPATIENT
Start: 2025-02-17 | End: 2025-02-19 | Stop reason: HOSPADM

## 2025-02-17 RX ADMIN — AZITHROMYCIN MONOHYDRATE 500 MG: 500 INJECTION, POWDER, LYOPHILIZED, FOR SOLUTION INTRAVENOUS at 17:56

## 2025-02-17 RX ADMIN — SODIUM CHLORIDE 1000 ML: 0.9 INJECTION, SOLUTION INTRAVENOUS at 15:05

## 2025-02-17 RX ADMIN — GUAIFENESIN 600 MG: 600 TABLET ORAL at 21:22

## 2025-02-17 RX ADMIN — CEFTRIAXONE SODIUM 1000 MG: 1 INJECTION, POWDER, FOR SOLUTION INTRAMUSCULAR; INTRAVENOUS at 17:50

## 2025-02-17 RX ADMIN — SODIUM CHLORIDE: 0.9 INJECTION, SOLUTION INTRAVENOUS at 21:29

## 2025-02-17 RX ADMIN — PREDNISONE 20 MG: 20 TABLET ORAL at 21:22

## 2025-02-17 RX ADMIN — SODIUM CHLORIDE, PRESERVATIVE FREE 10 ML: 5 INJECTION INTRAVENOUS at 21:23

## 2025-02-17 RX ADMIN — HEPARIN SODIUM 5000 UNITS: 5000 INJECTION INTRAVENOUS; SUBCUTANEOUS at 21:23

## 2025-02-17 RX ADMIN — GABAPENTIN 100 MG: 100 CAPSULE ORAL at 21:22

## 2025-02-17 RX ADMIN — TRAZODONE HYDROCHLORIDE 50 MG: 50 TABLET ORAL at 21:22

## 2025-02-17 RX ADMIN — QUETIAPINE FUMARATE 200 MG: 200 TABLET ORAL at 21:23

## 2025-02-17 RX ADMIN — METOPROLOL TARTRATE 25 MG: 50 TABLET, FILM COATED ORAL at 21:14

## 2025-02-17 RX ADMIN — PRAZOSIN HYDROCHLORIDE 1 MG: 1 CAPSULE ORAL at 21:23

## 2025-02-17 ASSESSMENT — PAIN - FUNCTIONAL ASSESSMENT: PAIN_FUNCTIONAL_ASSESSMENT: 0-10

## 2025-02-17 ASSESSMENT — ENCOUNTER SYMPTOMS: COUGH: 1

## 2025-02-17 ASSESSMENT — PAIN SCALES - GENERAL: PAINLEVEL_OUTOF10: 0

## 2025-02-17 NOTE — H&P
[facility]  Office: [phone]  Santo Steele DO, James Serrano DO, Hayder Yadav DO, Mumtaz Lay DO, Georgi Chung MD, Emperatriz Simon MD, Ryan Lawrence MD, Stacey Marquez MD,  Amauri Anders MD, Claudia Cook MD, Loreto Nunez MD,  Radha Moore DO, Janice Wagner MD, Cristiano Chung MD, Bennett Steele DO, Shakira Meredith MD,  Jerry Baxter DO, Radha Suero MD, Di Pizarro MD, Didi Hernández MD, Beverley Soto MD,  Kunal Devlin MD, Zach Brizuela MD, Vladislav Madrid MD, Namrata Han MD, Tarik Bonilla MD, Nereida Murry MD, Breezy Natarajan DO, Edin Sadler MD, Radha Workman MD, Mohsin Reza, MD, Shirley Waterhouse, CNP,  Vivi Portillo CNP, Breezy Rollins, CNP,  Evy Schneider, DNP, Adalgisa Correa, CNP, Alisha Goetz, CNP, Rosario Nunez, CNP, Tamika Friedman CNP, Taya Funes PA-C, Romelia Verma, CNP, Keira Hardwick, CNP,  Marion Alicia, CNP, Kalyn Casanova, CNP, Ana Kaur, CNP,  Chetna Hirsch, CNS, Dayana Coyle CNP, Ibis Hinkle CNP,   Vicky Carrillo, CNP         [facility]   IN-PATIENT SERVICE   [facility]    HISTORY AND PHYSICAL EXAMINATION            Date:   2/17/2025  Patient name:  Elaine Polanco  Date of admission:  2/17/2025 12:41 PM  MRN:   [id number]  Account:  [id number]  YOB: 1970  PCP:    Alayna Gupta DTR  Room:   20/20  Code Status:    Full Code    Chief Complaint:     Chief Complaint   Patient presents with    Dizziness     X2 months, states [facility] center sent her for clearance        History Obtained From:     Patient and medical charts    History of Present Illness:     Elaine Polanco is a 54 y.o. Non- / non  female pmh bipolar disorder , nicotine dependence , suspected COPD, posttraumatic stress disorder , hypothyroidism  (has not filled levothyroxine in 3 years ), and prior polysubstance abuse (reports she is sober) who presents to  ER with c/o feeling like a \"zombie\" , + food getting  stuck in her upper mid esophagus with dry mouth,  decreased urinary output, fatigue, headache and dizziness.    She was previously evaluated in the ED 2/11/2025, treated for bronchitis given she complained of upper URI symptoms for 2 weeks prior associated with a cough.  Normal discharge prednisone which is reported completing 2 days prior.     Lab work notable to elevated creat =1.4 (4/20/2024=0.8); wbc =18.2  Ua WNL    Chest xray --> lingular atelectasis       and is admitted to the hospital for the management of Pneumonia due to infectious agent.      Blood pressure 98/60, pulse 62, temperature 97.2 °F (36.2 °C), temperature source Oral, resp. rate 18, weight 67.2 kg (148 lb 2.4 oz), SpO2 99%.     Past Medical History:     Past Medical History:   Diagnosis Date    Bipolar disorder (HCC)     Diabetes mellitus (HCC)     Psychiatric problem     PTSD (post-traumatic stress disorder)     Thyroid disease         Past Surgical History:     Past Surgical History:   Procedure Laterality Date    UPPER GASTROINTESTINAL ENDOSCOPY N/A 4/11/2022    EGD BIOPSY performed by Valerie Elliott MD at [facility]        Medications Prior to Admission:     Prior to Admission medications    Medication Sig Start Date End Date Taking? Authorizing Provider   guaiFENesin (MUCINEX) 600 MG extended release tablet Take 1 tablet by mouth 2 times daily for 15 days 2/11/25 2/26/25  Debbie Villalta DO   ibuprofen (ADVIL;MOTRIN) 800 MG tablet Take 1 tablet by mouth every 6 hours as needed for Pain    ProviderKaren MD   albuterol sulfate HFA (PROVENTIL;VENTOLIN;PROAIR) 108 (90 Base) MCG/ACT inhaler PRN 12/13/24   Provider, Historical, MD   TRELEGY ELLIPTA 200-62.5-25 MCG/ACT AEPB inhaler  12/13/24   Karen Mancera MD   hydrOXYzine HCl (ATARAX) 50 MG tablet Take 1 tablet by mouth PRN 12/5/24   Karen Mancera MD   lithium 300 MG capsule Take 1 capsule by mouth at bedtime 12/5/24   Karen Mancera MD   metoprolol tartrate

## 2025-02-17 NOTE — ED PROVIDER NOTES
[facility]  Emergency Department  Faculty Attestation     I performed a history and physical examination of the patient and discussed management with the resident. I reviewed the resident’s note and agree with the documented findings and plan of care. Any areas of disagreement are noted on the chart. I was personally present for the key portions of any procedures. I have documented in the chart those procedures where I was not present during the key portions. I have reviewed the emergency nurses triage note. I agree with the chief complaint, past medical history, past surgical history, allergies, medications, social and family history as documented unless otherwise noted below.    For Physician Assistant/ Nurse Practitioner cases/documentation I have personally evaluated this patient and have completed at least one if not all key elements of the E/M (history, physical exam, and MDM). Additional findings are as noted.    Preliminary note started at 1:09 PM EST    Primary Care Physician:  Alayna Gupta DTR    Screenings:  [unfilled]    CHIEF COMPLAINT       Chief Complaint   Patient presents with    Dizziness     X2 months, states [facility] center sent her for clearance        RECENT VITALS:   BP (!) 138/94   Pulse 64   Temp 97.2 °F (36.2 °C) (Oral)   Resp 18   Wt 67.2 kg (148 lb 2.4 oz)   BMI 21.87 kg/m²     LABS:  Labs Reviewed - No data to display    Radiology  No orders to display       CRITICAL CARE: There was a high probability of clinically significant/life threatening deterioration in this patient's condition which required my urgent intervention.  Total critical care time was none minutes.  This excludes any time for separately reportable procedures.     EKG:  EKG Interpretation    Interpreted by me    Rhythm: normal sinus   Rate: normal  Axis: normal  Ectopy: none  Conduction: normal  ST Segments: no acute change  T Waves: Diffuse flattening  Q Waves:

## 2025-02-17 NOTE — ED NOTES
ED to inpatient nurses report      Chief Complaint:  Chief Complaint   Patient presents with    Dizziness     X2 months, states [facility] sent her for clearance      Present to ED from: inpatient at [facility] for clearance for 2 months of dizziness.     MOA:     LOC: alert and orientated to name, place, date  Mobility: Independent  Oxygen Baseline: RA    Current needs required: RA  Pending ED orders: na  Present condition: stable    Why did the patient come to the ED? Dizziness   What is the plan? IV abx, fluid hydration   Any procedures or intervention occur? Labs, meds, fluids   Any safety concerns?? no    Mental Status:  Level of Consciousness: Alert (0)    Psych Assessment:   Psychosocial  Psychosocial (WDL): Within Defined Limits  Vital signs   Vitals:    02/17/25 1300 02/17/25 1616 02/17/25 1649 02/17/25 1800   BP:  98/60  (!) 153/101   Pulse:  63 62 80   Resp:       Temp:       TempSrc:       SpO2: 99%   97%   Weight:            Vitals:  Patient Vitals for the past 24 hrs:   BP Temp Temp src Pulse Resp SpO2 Weight   02/17/25 1800 (!) 153/101 -- -- 80 -- 97 % --   02/17/25 1649 -- -- -- 62 -- -- --   02/17/25 1616 98/60 -- -- 63 -- -- --   02/17/25 1300 -- -- -- -- -- 99 % --   02/17/25 1249 (!) 138/94 -- -- -- -- -- --   02/17/25 1246 -- 97.2 °F (36.2 °C) Oral 64 18 -- 67.2 kg (148 lb 2.4 oz)      Visit Vitals  BP (!) 153/101   Pulse 80   Temp 97.2 °F (36.2 °C) (Oral)   Resp 18   Wt 67.2 kg (148 lb 2.4 oz)   SpO2 97%   BMI 21.87 kg/m²        LDAs:   Peripheral IV 02/17/25 Left Antecubital (Active)   Site Assessment Clean, dry & intact 02/17/25 1255   Line Status Brisk blood return;Normal saline locked 02/17/25 1255   Dressing Status New dressing applied 02/17/25 1255   Dressing Type Transparent 02/17/25 1255   Dressing Intervention New 02/17/25 0431       Ambulatory Status:  Presents to emergency department  because of falls (Syncope, seizure, or loss of consciousness): No, Age > 70: No, Altered Mental

## 2025-02-17 NOTE — ED PROVIDER NOTES
Faculty Sign-Out Attestation  Handoff taken on the following patient from prior Attending Physician: Jennifer    Note Started: 3:38 PM EST    I was available and discussed any additional care issues that arose and coordinated the management plans with the resident(s) caring for the patient during my duty period. Any areas of disagreement with resident’s documentation of care or procedures are noted on the chart. I was personally present for the key portions of any/all procedures during my duty period. I have documented in the chart those procedures where I was not present during the key portions.    XR CHEST (2 VW)   Final Result   Lingular atelectasis.           6:01 PM EST  Spoke with Interm hospitalist service at this time excepted patient to their service no additional orders for the ED they will place orders.      none    Bennett Horne MD  Attending Physician        Bennett Horne MD  02/17/25 0484

## 2025-02-18 LAB
ANION GAP SERPL CALCULATED.3IONS-SCNC: 9 MMOL/L (ref 9–16)
BASOPHILS # BLD: 0.04 K/UL (ref 0–0.2)
BASOPHILS NFR BLD: 0 % (ref 0–2)
BUN SERPL-MCNC: 15 MG/DL (ref 6–20)
CALCIUM SERPL-MCNC: 9.3 MG/DL (ref 8.6–10.4)
CHLORIDE SERPL-SCNC: 110 MMOL/L (ref 98–107)
CO2 SERPL-SCNC: 20 MMOL/L (ref 20–31)
CREAT SERPL-MCNC: 1 MG/DL (ref 0.6–0.9)
EOSINOPHIL # BLD: 0.07 K/UL (ref 0–0.44)
EOSINOPHILS RELATIVE PERCENT: 1 % (ref 1–4)
ERYTHROCYTE [DISTWIDTH] IN BLOOD BY AUTOMATED COUNT: 12 % (ref 11.8–14.4)
GFR, ESTIMATED: 67 ML/MIN/1.73M2
GLUCOSE SERPL-MCNC: 112 MG/DL (ref 74–99)
HCT VFR BLD AUTO: 39.5 % (ref 36.3–47.1)
HGB BLD-MCNC: 12.9 G/DL (ref 11.9–15.1)
IMM GRANULOCYTES # BLD AUTO: 0.12 K/UL (ref 0–0.3)
IMM GRANULOCYTES NFR BLD: 1 %
LACTIC ACID, SEPSIS WHOLE BLOOD: 1.3 MMOL/L (ref 0.5–1.9)
LYMPHOCYTES NFR BLD: 1.54 K/UL (ref 1.1–3.7)
LYMPHOCYTES RELATIVE PERCENT: 10 % (ref 24–43)
MCH RBC QN AUTO: 32.3 PG (ref 25.2–33.5)
MCHC RBC AUTO-ENTMCNC: 32.7 G/DL (ref 28.4–34.8)
MCV RBC AUTO: 99 FL (ref 82.6–102.9)
MONOCYTES NFR BLD: 0.38 K/UL (ref 0.1–1.2)
MONOCYTES NFR BLD: 2 % (ref 3–12)
NEUTROPHILS NFR BLD: 86 % (ref 36–65)
NEUTS SEG NFR BLD: 13.37 K/UL (ref 1.5–8.1)
NRBC BLD-RTO: 0 PER 100 WBC
PLATELET # BLD AUTO: 405 K/UL (ref 138–453)
PMV BLD AUTO: 8.9 FL (ref 8.1–13.5)
POTASSIUM SERPL-SCNC: 4.4 MMOL/L (ref 3.7–5.3)
PROCALCITONIN SERPL-MCNC: 0.05 NG/ML (ref 0–0.09)
RBC # BLD AUTO: 3.99 M/UL (ref 3.95–5.11)
SODIUM SERPL-SCNC: 139 MMOL/L (ref 136–145)
T4 FREE SERPL-MCNC: 0.7 NG/DL (ref 0.9–1.7)
TSH SERPL DL<=0.05 MIU/L-ACNC: 5.79 UIU/ML (ref 0.27–4.2)
WBC OTHER # BLD: 15.5 K/UL (ref 3.5–11.3)

## 2025-02-18 PROCEDURE — 96376 TX/PRO/DX INJ SAME DRUG ADON: CPT

## 2025-02-18 PROCEDURE — 85025 COMPLETE CBC W/AUTO DIFF WBC: CPT

## 2025-02-18 PROCEDURE — 6360000002 HC RX W HCPCS: Performed by: NURSE PRACTITIONER

## 2025-02-18 PROCEDURE — G0378 HOSPITAL OBSERVATION PER HR: HCPCS

## 2025-02-18 PROCEDURE — 96366 THER/PROPH/DIAG IV INF ADDON: CPT

## 2025-02-18 PROCEDURE — 83605 ASSAY OF LACTIC ACID: CPT

## 2025-02-18 PROCEDURE — 80048 BASIC METABOLIC PNL TOTAL CA: CPT

## 2025-02-18 PROCEDURE — 2500000003 HC RX 250 WO HCPCS: Performed by: NURSE PRACTITIONER

## 2025-02-18 PROCEDURE — 6370000000 HC RX 637 (ALT 250 FOR IP): Performed by: NURSE PRACTITIONER

## 2025-02-18 PROCEDURE — 96372 THER/PROPH/DIAG INJ SC/IM: CPT

## 2025-02-18 PROCEDURE — 96361 HYDRATE IV INFUSION ADD-ON: CPT

## 2025-02-18 PROCEDURE — 97165 OT EVAL LOW COMPLEX 30 MIN: CPT

## 2025-02-18 PROCEDURE — 2580000003 HC RX 258: Performed by: NURSE PRACTITIONER

## 2025-02-18 PROCEDURE — 99232 SBSQ HOSP IP/OBS MODERATE 35: CPT | Performed by: FAMILY MEDICINE

## 2025-02-18 PROCEDURE — 97530 THERAPEUTIC ACTIVITIES: CPT

## 2025-02-18 PROCEDURE — 86738 MYCOPLASMA ANTIBODY: CPT

## 2025-02-18 PROCEDURE — 84145 PROCALCITONIN (PCT): CPT

## 2025-02-18 PROCEDURE — 36415 COLL VENOUS BLD VENIPUNCTURE: CPT

## 2025-02-18 PROCEDURE — 84443 ASSAY THYROID STIM HORMONE: CPT

## 2025-02-18 PROCEDURE — 94640 AIRWAY INHALATION TREATMENT: CPT

## 2025-02-18 PROCEDURE — 97161 PT EVAL LOW COMPLEX 20 MIN: CPT

## 2025-02-18 PROCEDURE — 84439 ASSAY OF FREE THYROXINE: CPT

## 2025-02-18 PROCEDURE — 6370000000 HC RX 637 (ALT 250 FOR IP): Performed by: FAMILY MEDICINE

## 2025-02-18 RX ORDER — IPRATROPIUM BROMIDE AND ALBUTEROL SULFATE 2.5; .5 MG/3ML; MG/3ML
1 SOLUTION RESPIRATORY (INHALATION)
Status: DISCONTINUED | OUTPATIENT
Start: 2025-02-18 | End: 2025-02-19 | Stop reason: HOSPADM

## 2025-02-18 RX ORDER — IPRATROPIUM BROMIDE AND ALBUTEROL SULFATE 2.5; .5 MG/3ML; MG/3ML
1 SOLUTION RESPIRATORY (INHALATION) 2 TIMES DAILY
Status: DISCONTINUED | OUTPATIENT
Start: 2025-02-18 | End: 2025-02-18

## 2025-02-18 RX ADMIN — BUDESONIDE AND FORMOTEROL FUMARATE DIHYDRATE 2 PUFF: 160; 4.5 AEROSOL RESPIRATORY (INHALATION) at 20:52

## 2025-02-18 RX ADMIN — WATER 1000 MG: 1 INJECTION INTRAMUSCULAR; INTRAVENOUS; SUBCUTANEOUS at 23:55

## 2025-02-18 RX ADMIN — HEPARIN SODIUM 5000 UNITS: 5000 INJECTION INTRAVENOUS; SUBCUTANEOUS at 05:38

## 2025-02-18 RX ADMIN — TRAZODONE HYDROCHLORIDE 50 MG: 50 TABLET ORAL at 20:35

## 2025-02-18 RX ADMIN — QUETIAPINE FUMARATE 200 MG: 200 TABLET ORAL at 20:35

## 2025-02-18 RX ADMIN — METOPROLOL TARTRATE 25 MG: 50 TABLET, FILM COATED ORAL at 09:32

## 2025-02-18 RX ADMIN — HEPARIN SODIUM 5000 UNITS: 5000 INJECTION INTRAVENOUS; SUBCUTANEOUS at 14:21

## 2025-02-18 RX ADMIN — IPRATROPIUM BROMIDE AND ALBUTEROL SULFATE 1 DOSE: .5; 2.5 SOLUTION RESPIRATORY (INHALATION) at 12:40

## 2025-02-18 RX ADMIN — QUETIAPINE FUMARATE 200 MG: 200 TABLET ORAL at 09:32

## 2025-02-18 RX ADMIN — GABAPENTIN 100 MG: 100 CAPSULE ORAL at 20:36

## 2025-02-18 RX ADMIN — GABAPENTIN 100 MG: 100 CAPSULE ORAL at 09:31

## 2025-02-18 RX ADMIN — GUAIFENESIN 600 MG: 600 TABLET ORAL at 20:36

## 2025-02-18 RX ADMIN — AZITHROMYCIN MONOHYDRATE 500 MG: 500 INJECTION, POWDER, LYOPHILIZED, FOR SOLUTION INTRAVENOUS at 00:19

## 2025-02-18 RX ADMIN — PRAZOSIN HYDROCHLORIDE 1 MG: 1 CAPSULE ORAL at 20:35

## 2025-02-18 RX ADMIN — PREDNISONE 20 MG: 20 TABLET ORAL at 09:32

## 2025-02-18 RX ADMIN — IPRATROPIUM BROMIDE AND ALBUTEROL SULFATE 1 DOSE: .5; 2.5 SOLUTION RESPIRATORY (INHALATION) at 20:52

## 2025-02-18 RX ADMIN — IPRATROPIUM BROMIDE AND ALBUTEROL SULFATE 1 DOSE: .5; 2.5 SOLUTION RESPIRATORY (INHALATION) at 15:49

## 2025-02-18 RX ADMIN — PANTOPRAZOLE SODIUM 40 MG: 40 TABLET, DELAYED RELEASE ORAL at 05:38

## 2025-02-18 RX ADMIN — GUAIFENESIN 600 MG: 600 TABLET ORAL at 09:32

## 2025-02-18 RX ADMIN — GABAPENTIN 100 MG: 100 CAPSULE ORAL at 14:22

## 2025-02-18 RX ADMIN — METOPROLOL TARTRATE 25 MG: 50 TABLET, FILM COATED ORAL at 20:35

## 2025-02-18 RX ADMIN — IPRATROPIUM BROMIDE AND ALBUTEROL SULFATE 1 DOSE: .5; 2.5 SOLUTION RESPIRATORY (INHALATION) at 08:32

## 2025-02-18 RX ADMIN — WATER 1000 MG: 1 INJECTION INTRAMUSCULAR; INTRAVENOUS; SUBCUTANEOUS at 00:09

## 2025-02-18 RX ADMIN — QUETIAPINE FUMARATE 200 MG: 200 TABLET ORAL at 14:21

## 2025-02-18 RX ADMIN — BUDESONIDE AND FORMOTEROL FUMARATE DIHYDRATE 2 PUFF: 160; 4.5 AEROSOL RESPIRATORY (INHALATION) at 08:32

## 2025-02-18 ASSESSMENT — PAIN SCALES - GENERAL: PAINLEVEL_OUTOF10: 0

## 2025-02-18 NOTE — PROGRESS NOTES
Physical Therapy  Facility/Department: [facility]   Physical Therapy Initial Evaluation    Patient Name: Elaine Polanco        MRN: [id number]    : 1970    Date of Service: 2025    Chief Complaint   Patient presents with    Dizziness     X2 months, states [facility] sent her for clearance      Past Medical History:  has a past medical history of Bipolar disorder (HCC), Diabetes mellitus (HCC), Psychiatric problem, PTSD (post-traumatic stress disorder), and Thyroid disease.  Past Surgical History:  has a past surgical history that includes Upper gastrointestinal endoscopy (N/A, 2022).    Discharge Recommendations   No further therapy required at discharge.     PT Equipment Recommendations  Equipment Needed: No    Assessment  Assessment: Pt amb 250' IND no AD. Pt negotiated 8 steps IND L rail use. Pt reprots being IND PLOF, does not use AD. Elevators at [facility] are not operational, pt must negotiate 1 flight of steps into apartment. Pt negotiates steps on eval w/out difficulty. Pt denies lightheadedness or dizziness throughout session. Pt demonstrates functional mobility and safety throughout eval. Pt denies barriers to safe return home. PT to Sign off.  Therapy Prognosis: Excellent  Decision Making: Low Complexity  Requires PT Follow-Up: No  Activity Tolerance  Activity Tolerance: Patient limited by endurance, Other (comment), Patient limited by fatigue  Activity Tolerance Comments: Supine - BP: 153/92 mmHg.  Sitting - BP: 159/106 mmHg.  Safety Devices  Type of Devices: All fall risk precautions in place, Call light within reach, Left in bed, Gait belt, Nurse notified  Restraints  Restraints Initially in Place: No    AM-PAC  AM-PAC Basic Mobility - Inpatient   How much help is needed turning from your back to your side while in a flat bed without using bedrails?: None  How much help is needed moving from lying on your back to sitting on the side of a flat bed without using bedrails?:

## 2025-02-18 NOTE — PROGRESS NOTES
Occupational Therapy Initial Evaluation  Facility/Department: [facility]     Patient Name: Elaine Polanco        MRN: [id number]    : 1970    Date of Service: 2025    Chief Complaint   Patient presents with    Dizziness     X2 months, states [facility] sent her for clearance      Past Medical History:  has a past medical history of Bipolar disorder (HCC), Diabetes mellitus (HCC), Psychiatric problem, PTSD (post-traumatic stress disorder), and Thyroid disease.  Past Surgical History:  has a past surgical history that includes Upper gastrointestinal endoscopy (N/A, 2022).    Discharge Recommendations  Discharge Recommendations: Home independently  OT Equipment Recommendations  Equipment Needed: No    Assessment  Assessment: No acute OT needs warranted at this time. Pt is IND with all ADLs and functional mobility during ADLs with no AD. Pt with no reports of dizziness/lightheadeness throughout session. Pt denies any questions/concerns regarding completion of self-care tasks currently or upon d/c. Pt demonstrates the safe functional abilities to return to prior living arrangements at the [facility].  Prognosis: Good  Decision Making: Low Complexity  No Skilled OT: Independent with functional mobility;Independent with ADL's  REQUIRES OT FOLLOW-UP: No  Activity Tolerance  Activity Tolerance: Patient Tolerated treatment well  Safety Devices  Type of Devices: All fall risk precautions in place;Call light within reach;Left in bed;Gait belt;Nurse notified  Restraints  Restraints Initially in Place: No    AM-PAC  AM-PAC Daily Activity - Inpatient   How much help is needed for putting on and taking off regular lower body clothing?: None  How much help is needed for bathing (which includes washing, rinsing, drying)?: None  How much help is needed for toileting (which includes using toilet, bedpan, or urinal)?: None  How much help is needed for putting on and taking off regular upper body clothing?:  understanding    Goals  Short Term Goals  Time Frame for Short Term Goals: DC OT    Plan  Occupational Therapy Plan  Times Per Week: DC OT    Minutes  OT Individual Minutes  Time In: 0901  Time Out: 0918  Minutes: 17  Time Code Minutes   Timed Code Treatment Minutes: 0 Minutes    Co-eval with PT; Time code treatment minutes adjusted accordingly    Electronically signed by Taya Tejada OT on 2/18/25 at 12:19 PM EST

## 2025-02-18 NOTE — PROGRESS NOTES
[facility]  Office: [phone]  Santo Steele DO, James Serrano DO, Hayder Yadav DO, Mumtaz Lay DO, Georgi Chung MD, Emperatriz Simon MD, Ryan Lawrence MD, Stacey Marquez MD,  Amauri Anders MD, Claudia Cook MD, Loreto Nunez MD,  Radha Moore DO, Janice Wagner MD, Cristiano Chung MD, Bennett Steele DO, Shakira Meredith MD,  Jerry Baxter DO, Radha Suero MD, Di Pizarro MD, Didi Hernández MD, Beverley Soto MD,  Kunal Devlin MD, Zach Brizuela MD, Vladislav Madrid MD, Namrata Han MD, Tarik Bonilla MD, Nereida Murry MD, Breezy Natarajan DO, Edin Sadler MD, Radha Workman MD, Mohsin Reza, MD, Shirley Waterhouse, CNP,  Vivi Portillo CNP, Breezy Rollins, CNP,  Evy Schneider, BILL, Adalgisa Correa, CNP, Alisha Goetz, CNP, Rosario Nunez, CNP, Tamika Friedman CNP, Taya Funes PA-C, Romelia Vrema, CNP, Keira Hardwick, CNP,  Marion Alicia, CNP, Kalyn Casanova, CNP, Ana Kaur, CNP,  Chetna Hirsch, CNS, Dayana Coyle CNP, Ibis Hinkle CNP,   Vicky Carrillo, CNP            [facility]   IN-PATIENT SERVICE   [facility]  Hospitalist Progress Note          Patient:  Elaine Polanco    Unit/Bed:OBS 13/13-1  YOB: 1970  MRN: [id number]   Acct: [id number]   PCP: Alayna Gupta DTR  Date of Admission: 2/17/2025    Assessment/Plan:    Acute kidney injury  Likely secondary to poor p.o. intake/dehydration.  Improved with IV hydration.  Avoid nephrotoxic medications.  Avoid NSAIDs    Recent URI/bronchitis/COPD exacerbation  Daily smoker.  Has documented history of COPD on previous notes  Suspect COPD exacerbation.  Continue azithromycin, Rocephin at this time.  Continue Mucinex.  Continue DuoNebs but will adjust the treatment to every 6 hours from twice daily.  Currently on prednisone 20 mg daily.  Plan discharge in 24 hours if stable.    Bipolar disorder/PTSD/Insomnia  Continue gabapentin,  VIEWS OF THE CHEST 2/17/2025 2:24 pm COMPARISON: 02/11/2025 HISTORY: Persistent cough for 1 week. FINDINGS: Normal cardiomediastinal silhouette.  Linear atelectasis in the lingula.  No consolidation, pleural effusion, or pneumothorax.  Osteopenia.     Lingular atelectasis.       Electronically signed by Mohsin Mohammad Reza, MD on 2/18/2025 at 9:58 AM

## 2025-02-18 NOTE — RT PROTOCOL NOTE
RT Inhaler-Nebulizer Bronchodilator Protocol Note    There is a bronchodilator order in the chart from a provider indicating to follow the RT Bronchodilator Protocol and there is an “Initiate RT Inhaler-Nebulizer Bronchodilator Protocol” order as well (see protocol at bottom of note).    CXR Findings:  No results found.    The findings from the last RT Protocol Assessment were as follows:   History Pulmonary Disease: Chronic pulmonary disease  Respiratory Pattern: Regular pattern and RR 12-20 bpm  Breath Sounds: Slightly diminished and/or crackles  Cough: Strong, spontaneous, non-productive  Indication for Bronchodilator Therapy: Decreased or absent breath sounds  Bronchodilator Assessment Score: 4    Aerosolized bronchodilator medication orders have been revised according to the RT Inhaler-Nebulizer Bronchodilator Protocol below.    Respiratory Therapist to perform RT Therapy Protocol Assessment initially then follow the protocol.  Repeat RT Therapy Protocol Assessment PRN for score 0-3 or on second treatment, BID, and PRN for scores above 3.    No Indications - adjust the frequency to every 6 hours PRN wheezing or bronchospasm, if no treatments needed after 48 hours then discontinue using Per Protocol order mode.     If indication present, adjust the RT bronchodilator orders based on the Bronchodilator Assessment Score as indicated below.  Use Inhaler orders unless patient has one or more of the following: on home nebulizer, not able to hold breath for 10 seconds, is not alert and oriented, cannot activate and use MDI correctly, or respiratory rate 25 breaths per minute or more, then use the equivalent nebulizer order(s) with same Frequency and PRN reasons based on the score.  If a patient is on this medication at home then do not decrease Frequency below that used at home.    0-3 - enter or revise RT bronchodilator order(s) to equivalent RT Bronchodilator order with Frequency of every 4 hours PRN for wheezing or  increased work of breathing using Per Protocol order mode.        4-6 - enter or revise RT Bronchodilator order(s) to two equivalent RT bronchodilator orders with one order with BID Frequency and one order with Frequency of every 4 hours PRN wheezing or increased work of breathing using Per Protocol order mode.        7-10 - enter or revise RT Bronchodilator order(s) to two equivalent RT bronchodilator orders with one order with TID Frequency and one order with Frequency of every 4 hours PRN wheezing or increased work of breathing using Per Protocol order mode.       11-13 - enter or revise RT Bronchodilator order(s) to one equivalent RT bronchodilator order with QID Frequency and an Albuterol order with Frequency of every 4 hours PRN wheezing or increased work of breathing using Per Protocol order mode.      Greater than 13 - enter or revise RT Bronchodilator order(s) to one equivalent RT bronchodilator order with every 4 hours Frequency and an Albuterol order with Frequency of every 2 hours PRN wheezing or increased work of breathing using Per Protocol order mode.     RT to enter RT Home Evaluation for COPD & MDI Assessment order using Per Protocol order mode.    Electronically signed by Genoveva Guzman RCP on 2/18/2025 at 9:24 AM

## 2025-02-18 NOTE — PROGRESS NOTES
[facility]  Speech Language Pathology    Date: 2/18/2025  Patient Name: Elaine Polanco  YOB: 1970   AGE: 54 y.o.  MRN: [id number]        Patient Not Available for Speech Therapy     Due to:  [] Testing  [] Hemodialysis  [] Cancelled by RN  [] Surgery   [] Intubation/Sedation/Pain Medication  [] Medical instability  [x] Other: Spoke with RN, pt. Tolerating recommended diet without difficulty.  No concerns at this time.  No need for ST bedside swallow study.  Please re-consult if needed.     Next scheduled treatment: as needed  Completed by: TREVOR PARMAR, SLP, M.A. CCC-SLP

## 2025-02-18 NOTE — PLAN OF CARE
Problem: Discharge Planning  Goal: Discharge to home or other facility with appropriate resources  Outcome: Progressing  Flowsheets (Taken 2/18/2025 0415)  Discharge to home or other facility with appropriate resources: Identify barriers to discharge with patient and caregiver     Problem: Safety - Adult  Goal: Free from fall injury  Outcome: Progressing  Flowsheets (Taken 2/18/2025 0415)  Free From Fall Injury: Instruct family/caregiver on patient safety

## 2025-02-19 VITALS
SYSTOLIC BLOOD PRESSURE: 129 MMHG | HEIGHT: 69 IN | DIASTOLIC BLOOD PRESSURE: 112 MMHG | BODY MASS INDEX: 22.8 KG/M2 | OXYGEN SATURATION: 91 % | WEIGHT: 153.9 LBS | TEMPERATURE: 98.1 F | HEART RATE: 65 BPM | RESPIRATION RATE: 18 BRPM

## 2025-02-19 LAB — M PNEUMO IGM SER QL IA: 1.24

## 2025-02-19 PROCEDURE — 6360000002 HC RX W HCPCS: Performed by: NURSE PRACTITIONER

## 2025-02-19 PROCEDURE — 99239 HOSP IP/OBS DSCHRG MGMT >30: CPT | Performed by: FAMILY MEDICINE

## 2025-02-19 PROCEDURE — 6370000000 HC RX 637 (ALT 250 FOR IP): Performed by: NURSE PRACTITIONER

## 2025-02-19 PROCEDURE — 2580000003 HC RX 258: Performed by: NURSE PRACTITIONER

## 2025-02-19 PROCEDURE — G0378 HOSPITAL OBSERVATION PER HR: HCPCS

## 2025-02-19 PROCEDURE — 96366 THER/PROPH/DIAG IV INF ADDON: CPT

## 2025-02-19 PROCEDURE — 94640 AIRWAY INHALATION TREATMENT: CPT

## 2025-02-19 PROCEDURE — 6370000000 HC RX 637 (ALT 250 FOR IP): Performed by: FAMILY MEDICINE

## 2025-02-19 PROCEDURE — 2500000003 HC RX 250 WO HCPCS: Performed by: NURSE PRACTITIONER

## 2025-02-19 RX ORDER — BENZONATATE 100 MG/1
100 CAPSULE ORAL 3 TIMES DAILY PRN
Qty: 9 CAPSULE | Refills: 0 | Status: SHIPPED | OUTPATIENT
Start: 2025-02-19 | End: 2025-02-22

## 2025-02-19 RX ORDER — PREDNISONE 20 MG/1
20 TABLET ORAL DAILY
Qty: 2 TABLET | Refills: 0 | Status: SHIPPED | OUTPATIENT
Start: 2025-02-20 | End: 2025-02-22

## 2025-02-19 RX ORDER — CEFDINIR 300 MG/1
300 CAPSULE ORAL 2 TIMES DAILY
Qty: 14 CAPSULE | Refills: 0 | Status: SHIPPED | OUTPATIENT
Start: 2025-02-19 | End: 2025-02-26

## 2025-02-19 RX ADMIN — AZITHROMYCIN MONOHYDRATE 500 MG: 500 INJECTION, POWDER, LYOPHILIZED, FOR SOLUTION INTRAVENOUS at 00:01

## 2025-02-19 RX ADMIN — GUAIFENESIN 600 MG: 600 TABLET ORAL at 08:53

## 2025-02-19 RX ADMIN — PREDNISONE 20 MG: 20 TABLET ORAL at 08:53

## 2025-02-19 RX ADMIN — GABAPENTIN 100 MG: 100 CAPSULE ORAL at 08:53

## 2025-02-19 RX ADMIN — PANTOPRAZOLE SODIUM 40 MG: 40 TABLET, DELAYED RELEASE ORAL at 08:53

## 2025-02-19 RX ADMIN — METOPROLOL TARTRATE 25 MG: 50 TABLET, FILM COATED ORAL at 08:53

## 2025-02-19 RX ADMIN — SODIUM CHLORIDE, PRESERVATIVE FREE 10 ML: 5 INJECTION INTRAVENOUS at 08:54

## 2025-02-19 RX ADMIN — IPRATROPIUM BROMIDE AND ALBUTEROL SULFATE 1 DOSE: .5; 2.5 SOLUTION RESPIRATORY (INHALATION) at 08:24

## 2025-02-19 RX ADMIN — QUETIAPINE FUMARATE 200 MG: 200 TABLET ORAL at 08:53

## 2025-02-19 RX ADMIN — BUDESONIDE AND FORMOTEROL FUMARATE DIHYDRATE 2 PUFF: 160; 4.5 AEROSOL RESPIRATORY (INHALATION) at 08:24

## 2025-02-19 ASSESSMENT — PAIN SCALES - GENERAL
PAINLEVEL_OUTOF10: 0
PAINLEVEL_OUTOF10: 0

## 2025-02-19 NOTE — CARE COORDINATION
Case Management Assessment  Initial Evaluation    Date/Time of Evaluation: 2/19/2025 8:06 AM  Assessment Completed by: Eleno Rosario    If patient is discharged prior to next notation, then this note serves as note for discharge by case management.    Patient Name: Elaine Polanco                   YOB: 1970  Diagnosis: Pneumonia due to infectious agent [J18.9]  AGUSTIN (acute kidney injury) [N17.9]  Pneumonia due to infectious organism, unspecified laterality, unspecified part of lung [J18.9]                   Date / Time: 2/17/2025 12:41 PM    Patient Admission Status: Observation   Readmission Risk (Low < 19, Mod (19-27), High > 27): No data recorded  Current PCP: Alayna Gupta DTR  PCP verified by CM? (P) Yes    Chart Reviewed: Yes      History Provided by: (P) Patient  Patient Orientation: (P) Alert and Oriented    Patient Cognition: (P) Alert    Hospitalization in the last 30 days (Readmission):  No    If yes, Readmission Assessment in  Navigator will be completed.    Advance Directives:      Code Status: Full Code   Patient's Primary Decision Maker is: (P) Legal Next of Kin      Discharge Planning:    Patient lives with: (P) Alone Type of Home: (P) House  Primary Care Giver: (P) Self  Patient Support Systems include: (P) None   Current Financial resources: (P) Medicaid  Current community resources: (P) None  Current services prior to admission: (P) None            Current DME:              Type of Home Care services:  (P) None    ADLS  Prior functional level: (P) Independent in ADLs/IADLs  Current functional level: (P) Independent in ADLs/IADLs    PT AM-PAC: 24 /24  OT AM-PAC: 24 /24    Family can provide assistance at DC: (P) No  Would you like Case Management to discuss the discharge plan with any other family members/significant others, and if so, who? (P) Yes  Plans to Return to Present Housing: (P) Yes  Other Identified Issues/Barriers to RETURNING to current housing: None  Potential  Department  Ph: [phone] Fax: [phone]

## 2025-02-19 NOTE — DISCHARGE INSTRUCTIONS
You have multiple medications listed on your medlist which you are not taking. Please discuss with your Primary care physician and specialists, if you supposed to be taking them or not.

## 2025-02-19 NOTE — DISCHARGE SUMMARY
[facility]  Office: [phone]  Santo Steeel DO, James Serrano DO, Hayder Yadav DO, Mumtaz Lay DO, Georgi Chung MD, Emperatriz Simon MD, Ryan Lawrence MD, Stacey Marquez MD,  Amauri Anders MD, Claudia Cook MD, Loreto Nunez MD,  Radha Moore DO, Janice Wagner MD, Cristiano Chung MD, Bennett Steele DO, Shakira Meredith MD,  Jerry Baxter DO, Radha Suero MD, Di Pizarro MD, Didi Hernández MD, Beverley Soto MD,  Kunal Devlin MD, Zach Brizuela MD, Vladislav Madrid MD, Namrata Han MD, Tarik Bonilla MD, Nereida Murry MD, Breezy Natarajan DO, Edin Sadler MD, Radha Workman MD, Mohsin Reza, MD, Shirley Waterhouse, CNP,  Vivi Portillo, CNP, Breezy Rollins, CNP,  Evy Schneider, [facility], Adalgisa Correa, CNP, Alisha Goetz, CNP, Rosario Nunez, CNP, Tamika Friedman, CNP, ANGELI Mclain-CANDACE, Romelia Verma, CNP, Keira Hardwick, CNP,  Marion Alicia, CNP, Kalyn Casanova, CNP, Ana Kaur, CNP,  Chetna Hirsch, CNS, Dayana Coyle, CNP, Ibis Hinkle, [facility],   Vicky Carrillo, CNP       Hospitalist Discharge Summary      Patient: Elaine Polanco  YOB: 1970  MRN: [id number]   Acct: [id number]    Primary Care Physician: Alayna Gupta DTR    Admit date  2/17/2025    Discharge date:  2/19/2025 9:03 AM    Chief Complaint on presentation :-  sob    Discharge Assessment and Plan:-   Patient admitted for Sob found to have copd exacerbation, AGUSTIN. Patient treated with antibiotics, steroids and discharged home in stable condition. AGUSTIN resolved.   Patient needs to follow up with her pcp and psychiatrist to discuss medicines listed in med list. She has lithium and carbidopa-levidopa listed on her medlist but she is not taking them. Unsure of history of these medicines. I have resumed them on the med list for completion of med rec as pt did not say she was not taking them, but unsure of compliance or indication. She will need to see specialist to determine, if she needs to be on them and  known as: DEPAKOTE ER  Take 3 tablets by mouth at bedtime               Where to Get Your Medications        These medications were sent to [facility] - [address] - [place], [place] - [address] - P [phone] - F [phone]  [address] [place] [address]      Phone: [phone]   benzonatate 100 MG capsule  cefdinir 300 MG capsule  predniSONE 20 MG tablet          Labs :-  Recent Results (from the past 72 hour(s))   BMP    Collection Time: 02/17/25  1:58 PM   Result Value Ref Range    Sodium 136 136 - 145 mmol/L    Potassium 3.8 3.7 - 5.3 mmol/L    Chloride 104 98 - 107 mmol/L    CO2 24 20 - 31 mmol/L    Anion Gap 8 (L) 9 - 16 mmol/L    Glucose 140 (H) 74 - 99 mg/dL    BUN 19 6 - 20 mg/dL    Creatinine 1.4 (H) 0.6 - 0.9 mg/dL    Est, Glom Filt Rate 44 (L) >60 mL/min/1.73m2    Calcium 9.6 8.6 - 10.4 mg/dL   CBC with Auto Differential    Collection Time: 02/17/25  1:58 PM   Result Value Ref Range    WBC 18.2 (H) 3.5 - 11.3 k/uL    RBC 4.01 3.95 - 5.11 m/uL    Hemoglobin 13.1 11.9 - 15.1 g/dL    Hematocrit 39.4 36.3 - 47.1 %    MCV 98.3 82.6 - 102.9 fL    MCH 32.7 25.2 - 33.5 pg    MCHC 33.2 28.4 - 34.8 g/dL    RDW 12.2 11.8 - 14.4 %    Platelets 442 138 - 453 k/uL    MPV 9.1 8.1 - 13.5 fL    NRBC Automated 0.0 0.0 per 100 WBC    Neutrophils % 77 (H) 36 - 65 %    Lymphocytes % 13 (L) 24 - 43 %    Monocytes % 5 3 - 12 %    Eosinophils % 3 1 - 4 %    Basophils % 1 0 - 2 %    Immature Granulocytes % 1 (H) 0 %    Neutrophils Absolute 14.11 (H) 1.50 - 8.10 k/uL    Lymphocytes Absolute 2.42 1.10 - 3.70 k/uL    Monocytes Absolute 0.92 0.10 - 1.20 k/uL    Eosinophils Absolute 0.53 (H) 0.00 - 0.44 k/uL    Basophils Absolute 0.09 0.00 - 0.20 k/uL    Immature Granulocytes Absolute 0.13 0.00 - 0.30 k/uL   Blood Culture 1    Collection Time: 02/17/25  3:00 PM    Specimen: Blood   Result Value Ref Range    Specimen Description .BLOOD     Special Requests L AC     Culture NO GROWTH 1 DAY    Lactate,  Detected Not Detected    Bordetella parapertussis by PCR Not Detected Not Detected    B Pertussis by PCR Not Detected Not Detected    Chlamydia pneumoniae By PCR Not Detected Not Detected    Mycoplasma pneumo by PCR Not Detected Not Detected   Strep Pneumoniae Antigen    Collection Time: 02/17/25  6:49 PM    Specimen: Urine, clean catch   Result Value Ref Range    Source .URINE     Strep pneumo Ag NEGATIVE    LEGIONELLA ANTIGEN, URINE    Collection Time: 02/17/25  6:49 PM    Specimen: Urine   Result Value Ref Range    Legionella Pneumophilia Ag, Urine NEGATIVE NEGATIVE   Lactate, Sepsis    Collection Time: 02/18/25  1:39 AM   Result Value Ref Range    Lactic Acid, Sepsis, Whole Blood 1.3 0.5 - 1.9 mmol/L   Procalcitonin    Collection Time: 02/18/25  1:39 AM   Result Value Ref Range    Procalcitonin 0.05 0.00 - 0.09 ng/mL   Basic Metabolic Panel w/ Reflex to MG    Collection Time: 02/18/25  6:37 AM   Result Value Ref Range    Sodium 139 136 - 145 mmol/L    Potassium 4.4 3.7 - 5.3 mmol/L    Chloride 110 (H) 98 - 107 mmol/L    CO2 20 20 - 31 mmol/L    Anion Gap 9 9 - 16 mmol/L    Glucose 112 (H) 74 - 99 mg/dL    BUN 15 6 - 20 mg/dL    Creatinine 1.0 (H) 0.6 - 0.9 mg/dL    Est, Glom Filt Rate 67 >60 mL/min/1.73m2    Calcium 9.3 8.6 - 10.4 mg/dL   CBC with Auto Differential    Collection Time: 02/18/25  6:37 AM   Result Value Ref Range    WBC 15.5 (H) 3.5 - 11.3 k/uL    RBC 3.99 3.95 - 5.11 m/uL    Hemoglobin 12.9 11.9 - 15.1 g/dL    Hematocrit 39.5 36.3 - 47.1 %    MCV 99.0 82.6 - 102.9 fL    MCH 32.3 25.2 - 33.5 pg    MCHC 32.7 28.4 - 34.8 g/dL    RDW 12.0 11.8 - 14.4 %    Platelets 405 138 - 453 k/uL    MPV 8.9 8.1 - 13.5 fL    NRBC Automated 0.0 0.0 per 100 WBC    Neutrophils % 86 (H) 36 - 65 %    Lymphocytes % 10 (L) 24 - 43 %    Monocytes % 2 (L) 3 - 12 %    Eosinophils % 1 1 - 4 %    Basophils % 0 0 - 2 %    Immature Granulocytes % 1 (H) 0 %    Neutrophils Absolute 13.37 (H) 1.50 - 8.10 k/uL    Lymphocytes

## 2025-02-19 NOTE — CARE COORDINATION
Dx: Pneumonia due to infectious agent [J18.9]  AGUSTIN (acute kidney injury) [N17.9]  Pneumonia due to infectious organism, unspecified laterality, unspecified part of lung [J18.9]    Admit date: 2/17/2025  GMLOS :  LOS :    [facility] RISK OF UNPLANNED READMISSION 2.0             0 Total Score        ______________________________________      Patients goal/ Transitional Planning : Spoke to Say at [facility] and was informed the patient needs to present her discharge paperwork upon returning back to facility. RN notified.       PT/OT recommendations : N/A        Barriers to discharge : Continue to follow until discharge.

## 2025-02-19 NOTE — PLAN OF CARE
Problem: Discharge Planning  Goal: Discharge to home or other facility with appropriate resources  2/19/2025 0920 by Georgina Duvall, RN  Outcome: Completed  2/18/2025 2342 by Magdy Garcia, RN  Outcome: Progressing     Problem: Safety - Adult  Goal: Free from fall injury  2/19/2025 0920 by Georgina Duvall, RN  Outcome: Completed  2/18/2025 2342 by Magdy Garcia, RN  Outcome: Progressing

## 2025-02-20 LAB
EKG ATRIAL RATE: 63 BPM
EKG P AXIS: 67 DEGREES
EKG P-R INTERVAL: 168 MS
EKG Q-T INTERVAL: 434 MS
EKG QRS DURATION: 98 MS
EKG QTC CALCULATION (BAZETT): 444 MS
EKG R AXIS: 62 DEGREES
EKG T AXIS: 27 DEGREES
EKG VENTRICULAR RATE: 63 BPM

## 2025-02-22 LAB
MICROORGANISM SPEC CULT: NORMAL
MICROORGANISM SPEC CULT: NORMAL
SERVICE CMNT-IMP: NORMAL
SERVICE CMNT-IMP: NORMAL
SPECIMEN DESCRIPTION: NORMAL
SPECIMEN DESCRIPTION: NORMAL

## 2025-02-28 ASSESSMENT — ENCOUNTER SYMPTOMS
EYES NEGATIVE: 1
COUGH: 1
ALLERGIC/IMMUNOLOGIC NEGATIVE: 1
GASTROINTESTINAL NEGATIVE: 1

## 2025-02-28 NOTE — ED PROVIDER NOTES
[facility] O[facility]ERVATION UNIT  Emergency Department Encounter  Emergency Medicine Resident     Pt Name:Elaine Polanco  MRN: [id number]  Birthdate 1970  Date of evaluation: 2/28/25  PCP:  Alayna Gupta DTR  Note Started: 7:30 AM EST      CHIEF COMPLAINT       Chief Complaint   Patient presents with    Dizziness     X2 months, states [facility] sent her for clearance        HISTORY OF PRESENT ILLNESS  (Location/Symptom, Timing/Onset, Context/Setting, Quality, Duration, Modifying Factors, Severity.)      Elaine Polanco is a 54 y.o. female with PMH of bipolar disorder, diabetes, thyroid disease, presents with cough and dizziness.  Patient states this started approximately 1 week ago for bronchitis, states her symptoms never fully resolved.  Patient states she was discharged with steroid medication that she completed.  Associated symptoms include lightheadedness, feeling shaky and productive cough with bright green phlegm.    PAST MEDICAL / SURGICAL / SOCIAL / FAMILY HISTORY      has a past medical history of Bipolar disorder (HCC), Diabetes mellitus (HCC), Psychiatric problem, PTSD (post-traumatic stress disorder), and Thyroid disease.       has a past surgical history that includes Upper gastrointestinal endoscopy (N/A, 4/11/2022).      Social History     Socioeconomic History    Marital status:      Spouse name: Not on file    Number of children: Not on file    Years of education: Not on file    Highest education level: Not on file   Occupational History    Not on file   Tobacco Use    Smoking status: Every Day     Current packs/day: 1.00     Types: Cigarettes    Smokeless tobacco: Never   Vaping Use    Vaping status: Some Days   Substance and Sexual Activity    Alcohol use: Not Currently     Comment: socially    Drug use: Not Currently     Types: Marijuana (Weed)    Sexual activity: Not Currently   Other Topics Concern    Not on file   Social History Narrative    Not on file     Social Determinants of Health        PROCEDURES:      CONSULTS:  IP CONSULT TO INTERNAL MEDICINE  IP CONSULT TO VASCULAR ACCESS TEAM    CRITICAL CARE:  There was significant risk of life threatening deterioration of patient's condition requiring my direct management. Critical care time  minutes, excluding any documented procedures.    FINAL IMPRESSION      1. Pneumonia due to infectious organism, unspecified laterality, unspecified part of lung    2. AGUSTIN (acute kidney injury)          DISPOSITION / PLAN     DISPOSITION Admitted 02/17/2025 06:14:41 PM               PATIENT REFERRED TO:  No follow-up provider specified.    DISCHARGE MEDICATIONS:  Discharge Medication List as of 2/19/2025  9:21 AM        START taking these medications    Details   predniSONE (DELTASONE) 20 MG tablet Take 1 tablet by mouth daily for 2 doses, Disp-2 tablet, R-0Normal      benzonatate (TESSALON) 100 MG capsule Take 1 capsule by mouth 3 times daily as needed for Cough, Disp-9 capsule, R-0Normal      cefdinir (OMNICEF) 300 MG capsule Take 1 capsule by mouth 2 times daily for 7 days, Disp-14 capsule, R-0Normal             Cody Lay MD  Emergency Medicine Resident    (Please note that portions of thisnote were completed with a voice recognition program.  Efforts were made to edit the dictations but occasionally words are mis-transcribed.)

## 2025-03-19 PROBLEM — E86.0 DEHYDRATION: Status: RESOLVED | Noted: 2025-02-17 | Resolved: 2025-03-19

## 2025-05-22 ENCOUNTER — APPOINTMENT (OUTPATIENT)
Dept: GENERAL RADIOLOGY | Age: 55
End: 2025-05-22
Payer: MEDICAID

## 2025-05-22 ENCOUNTER — APPOINTMENT (OUTPATIENT)
Dept: CT IMAGING | Age: 55
End: 2025-05-22
Payer: MEDICAID

## 2025-05-22 ENCOUNTER — HOSPITAL ENCOUNTER (INPATIENT)
Age: 55
LOS: 2 days | Discharge: HOME | End: 2025-05-24
Attending: EMERGENCY MEDICINE | Admitting: INTERNAL MEDICINE
Payer: MEDICAID

## 2025-05-22 DIAGNOSIS — N39.0 URINARY TRACT INFECTION WITHOUT HEMATURIA, SITE UNSPECIFIED: ICD-10-CM

## 2025-05-22 DIAGNOSIS — E03.9 HYPOTHYROIDISM, UNSPECIFIED TYPE: ICD-10-CM

## 2025-05-22 DIAGNOSIS — R74.8 ABNORMAL LIVER ENZYMES: ICD-10-CM

## 2025-05-22 DIAGNOSIS — N17.9 AKI (ACUTE KIDNEY INJURY): Primary | ICD-10-CM

## 2025-05-22 DIAGNOSIS — M48.02 CERVICAL STENOSIS OF SPINAL CANAL: ICD-10-CM

## 2025-05-22 LAB
AMPHET UR QL SCN: NEGATIVE
ANION GAP SERPL CALCULATED.3IONS-SCNC: 12 MMOL/L (ref 9–16)
BARBITURATES UR QL SCN: NEGATIVE
BASOPHILS # BLD: 0.06 K/UL (ref 0–0.2)
BASOPHILS NFR BLD: 1 % (ref 0–2)
BENZODIAZ UR QL: NEGATIVE
BILIRUB UR QL STRIP: NEGATIVE
BUN SERPL-MCNC: 12 MG/DL (ref 6–20)
CALCIUM SERPL-MCNC: 9.6 MG/DL (ref 8.6–10.4)
CANNABINOIDS UR QL SCN: NEGATIVE
CHLORIDE SERPL-SCNC: 101 MMOL/L (ref 98–107)
CK SERPL-CCNC: 46 U/L (ref 26–192)
CLARITY UR: ABNORMAL
CO2 SERPL-SCNC: 27 MMOL/L (ref 20–31)
COCAINE UR QL SCN: NEGATIVE
COLOR UR: YELLOW
CREAT SERPL-MCNC: 1.8 MG/DL (ref 0.5–0.9)
EOSINOPHIL # BLD: 0.34 K/UL (ref 0–0.44)
EOSINOPHILS RELATIVE PERCENT: 7 % (ref 1–4)
EPI CELLS #/AREA URNS HPF: ABNORMAL /HPF (ref 0–5)
ERYTHROCYTE [DISTWIDTH] IN BLOOD BY AUTOMATED COUNT: 12.5 % (ref 11.8–14.4)
ETHANOL PERCENT: NORMAL %
ETHANOLAMINE SERPL-MCNC: <10 MG/DL (ref 0–0.08)
FENTANYL UR QL: NEGATIVE
GFR, ESTIMATED: 33 ML/MIN/1.73M2
GLUCOSE SERPL-MCNC: 110 MG/DL (ref 74–99)
GLUCOSE UR STRIP-MCNC: NEGATIVE MG/DL
HCT VFR BLD AUTO: 35.5 % (ref 36.3–47.1)
HGB BLD-MCNC: 12.5 G/DL (ref 11.9–15.1)
HGB UR QL STRIP.AUTO: ABNORMAL
IMM GRANULOCYTES # BLD AUTO: 0.01 K/UL (ref 0–0.3)
IMM GRANULOCYTES NFR BLD: 0 %
KETONES UR STRIP-MCNC: NEGATIVE MG/DL
LEUKOCYTE ESTERASE UR QL STRIP: ABNORMAL
LITHIUM LEVEL: 0.7 MMOL/L (ref 0.6–1.2)
LYMPHOCYTES NFR BLD: 1.68 K/UL (ref 1.1–3.7)
LYMPHOCYTES RELATIVE PERCENT: 33 % (ref 24–43)
MAGNESIUM SERPL-MCNC: 2.3 MG/DL (ref 1.6–2.6)
MCH RBC QN AUTO: 33.8 PG (ref 25.2–33.5)
MCHC RBC AUTO-ENTMCNC: 35.2 G/DL (ref 28.4–34.8)
MCV RBC AUTO: 95.9 FL (ref 82.6–102.9)
METHADONE UR QL: NEGATIVE
MONOCYTES NFR BLD: 0.58 K/UL (ref 0.1–1.2)
MONOCYTES NFR BLD: 12 % (ref 3–12)
NEUTROPHILS NFR BLD: 47 % (ref 36–65)
NEUTS SEG NFR BLD: 2.37 K/UL (ref 1.5–8.1)
NITRITE UR QL STRIP: NEGATIVE
NRBC BLD-RTO: 0 PER 100 WBC
OPIATES UR QL SCN: NEGATIVE
OSMOLALITY UR: 203 MOSM/KG (ref 80–1300)
OXYCODONE UR QL SCN: NEGATIVE
PCP UR QL SCN: NEGATIVE
PH UR STRIP: 6 [PH] (ref 5–8)
PLATELET # BLD AUTO: 308 K/UL (ref 138–453)
PMV BLD AUTO: 9.3 FL (ref 8.1–13.5)
POTASSIUM SERPL-SCNC: 3.3 MMOL/L (ref 3.7–5.3)
PROT UR STRIP-MCNC: ABNORMAL MG/DL
RBC # BLD AUTO: 3.7 M/UL (ref 3.95–5.11)
RBC #/AREA URNS HPF: ABNORMAL /HPF (ref 0–2)
SODIUM SERPL-SCNC: 139 MMOL/L (ref 136–145)
SP GR UR STRIP: 1.01 (ref 1–1.03)
T4 FREE SERPL-MCNC: 0.6 NG/DL (ref 0.93–1.7)
TEST INFORMATION: NORMAL
TROPONIN I SERPL HS-MCNC: 6 NG/L (ref 0–14)
TSH SERPL DL<=0.05 MIU/L-ACNC: 4.7 UIU/ML (ref 0.27–4.2)
UROBILINOGEN UR STRIP-ACNC: NORMAL EU/DL (ref 0–1)
WBC #/AREA URNS HPF: ABNORMAL /HPF (ref 0–5)
WBC OTHER # BLD: 5 K/UL (ref 3.5–11.3)

## 2025-05-22 PROCEDURE — 80178 ASSAY OF LITHIUM: CPT

## 2025-05-22 PROCEDURE — 84443 ASSAY THYROID STIM HORMONE: CPT

## 2025-05-22 PROCEDURE — 6370000000 HC RX 637 (ALT 250 FOR IP): Performed by: EMERGENCY MEDICINE

## 2025-05-22 PROCEDURE — 85025 COMPLETE CBC W/AUTO DIFF WBC: CPT

## 2025-05-22 PROCEDURE — 80048 BASIC METABOLIC PNL TOTAL CA: CPT

## 2025-05-22 PROCEDURE — 99222 1ST HOSP IP/OBS MODERATE 55: CPT | Performed by: NURSE PRACTITIONER

## 2025-05-22 PROCEDURE — 93005 ELECTROCARDIOGRAM TRACING: CPT | Performed by: EMERGENCY MEDICINE

## 2025-05-22 PROCEDURE — 94640 AIRWAY INHALATION TREATMENT: CPT

## 2025-05-22 PROCEDURE — 83935 ASSAY OF URINE OSMOLALITY: CPT

## 2025-05-22 PROCEDURE — G0480 DRUG TEST DEF 1-7 CLASSES: HCPCS

## 2025-05-22 PROCEDURE — 6370000000 HC RX 637 (ALT 250 FOR IP): Performed by: NURSE PRACTITIONER

## 2025-05-22 PROCEDURE — 94760 N-INVAS EAR/PLS OXIMETRY 1: CPT

## 2025-05-22 PROCEDURE — 99285 EMERGENCY DEPT VISIT HI MDM: CPT

## 2025-05-22 PROCEDURE — 83735 ASSAY OF MAGNESIUM: CPT

## 2025-05-22 PROCEDURE — 2580000003 HC RX 258: Performed by: EMERGENCY MEDICINE

## 2025-05-22 PROCEDURE — 70450 CT HEAD/BRAIN W/O DYE: CPT

## 2025-05-22 PROCEDURE — 2580000003 HC RX 258: Performed by: NURSE PRACTITIONER

## 2025-05-22 PROCEDURE — 1200000000 HC SEMI PRIVATE

## 2025-05-22 PROCEDURE — 81001 URINALYSIS AUTO W/SCOPE: CPT

## 2025-05-22 PROCEDURE — 82550 ASSAY OF CK (CPK): CPT

## 2025-05-22 PROCEDURE — 71045 X-RAY EXAM CHEST 1 VIEW: CPT

## 2025-05-22 PROCEDURE — 6360000002 HC RX W HCPCS: Performed by: NURSE PRACTITIONER

## 2025-05-22 PROCEDURE — 84439 ASSAY OF FREE THYROXINE: CPT

## 2025-05-22 PROCEDURE — 80307 DRUG TEST PRSMV CHEM ANLYZR: CPT

## 2025-05-22 PROCEDURE — 84484 ASSAY OF TROPONIN QUANT: CPT

## 2025-05-22 RX ORDER — QUETIAPINE FUMARATE 200 MG/1
200 TABLET, FILM COATED ORAL 3 TIMES DAILY
COMMUNITY

## 2025-05-22 RX ORDER — MIRTAZAPINE 15 MG/1
30 TABLET, FILM COATED ORAL NIGHTLY
Status: DISCONTINUED | OUTPATIENT
Start: 2025-05-22 | End: 2025-05-22

## 2025-05-22 RX ORDER — ONDANSETRON 4 MG/1
4 TABLET, ORALLY DISINTEGRATING ORAL EVERY 8 HOURS PRN
Status: DISCONTINUED | OUTPATIENT
Start: 2025-05-22 | End: 2025-05-24 | Stop reason: HOSPADM

## 2025-05-22 RX ORDER — BUDESONIDE AND FORMOTEROL FUMARATE DIHYDRATE 160; 4.5 UG/1; UG/1
2 AEROSOL RESPIRATORY (INHALATION)
Status: DISCONTINUED | OUTPATIENT
Start: 2025-05-22 | End: 2025-05-24 | Stop reason: HOSPADM

## 2025-05-22 RX ORDER — PANTOPRAZOLE SODIUM 40 MG/1
40 TABLET, DELAYED RELEASE ORAL
Status: DISCONTINUED | OUTPATIENT
Start: 2025-05-23 | End: 2025-05-22

## 2025-05-22 RX ORDER — LITHIUM CARBONATE 300 MG
300 TABLET ORAL
Status: DISCONTINUED | OUTPATIENT
Start: 2025-05-22 | End: 2025-05-23

## 2025-05-22 RX ORDER — PRAZOSIN HYDROCHLORIDE 1 MG/1
1 CAPSULE ORAL NIGHTLY
Status: DISCONTINUED | OUTPATIENT
Start: 2025-05-22 | End: 2025-05-24 | Stop reason: HOSPADM

## 2025-05-22 RX ORDER — ENOXAPARIN SODIUM 100 MG/ML
40 INJECTION SUBCUTANEOUS EVERY EVENING
Status: DISCONTINUED | OUTPATIENT
Start: 2025-05-22 | End: 2025-05-24 | Stop reason: HOSPADM

## 2025-05-22 RX ORDER — METOPROLOL TARTRATE 25 MG/1
25 TABLET, FILM COATED ORAL 2 TIMES DAILY
Status: DISCONTINUED | OUTPATIENT
Start: 2025-05-22 | End: 2025-05-24 | Stop reason: HOSPADM

## 2025-05-22 RX ORDER — LITHIUM CARBONATE 300 MG/1
300 CAPSULE ORAL NIGHTLY
Status: DISCONTINUED | OUTPATIENT
Start: 2025-05-22 | End: 2025-05-22

## 2025-05-22 RX ORDER — IBUPROFEN 800 MG/1
800 TABLET, FILM COATED ORAL EVERY 8 HOURS PRN
Status: ON HOLD | COMMUNITY
End: 2025-05-24 | Stop reason: HOSPADM

## 2025-05-22 RX ORDER — ACETAMINOPHEN 325 MG/1
650 TABLET ORAL EVERY 6 HOURS PRN
Status: DISCONTINUED | OUTPATIENT
Start: 2025-05-22 | End: 2025-05-24 | Stop reason: HOSPADM

## 2025-05-22 RX ORDER — SODIUM CHLORIDE 0.9 % (FLUSH) 0.9 %
5-40 SYRINGE (ML) INJECTION EVERY 12 HOURS SCHEDULED
Status: DISCONTINUED | OUTPATIENT
Start: 2025-05-22 | End: 2025-05-24 | Stop reason: HOSPADM

## 2025-05-22 RX ORDER — POTASSIUM CHLORIDE 1500 MG/1
40 TABLET, EXTENDED RELEASE ORAL PRN
Status: DISCONTINUED | OUTPATIENT
Start: 2025-05-22 | End: 2025-05-24 | Stop reason: HOSPADM

## 2025-05-22 RX ORDER — SODIUM CHLORIDE 0.9 % (FLUSH) 0.9 %
5-40 SYRINGE (ML) INJECTION PRN
Status: DISCONTINUED | OUTPATIENT
Start: 2025-05-22 | End: 2025-05-24 | Stop reason: HOSPADM

## 2025-05-22 RX ORDER — LEVOTHYROXINE SODIUM 200 UG/1
200 TABLET ORAL ONCE
Status: COMPLETED | OUTPATIENT
Start: 2025-05-22 | End: 2025-05-22

## 2025-05-22 RX ORDER — TRAZODONE HYDROCHLORIDE 50 MG/1
50 TABLET ORAL NIGHTLY
Status: DISCONTINUED | OUTPATIENT
Start: 2025-05-22 | End: 2025-05-24 | Stop reason: HOSPADM

## 2025-05-22 RX ORDER — 0.9 % SODIUM CHLORIDE 0.9 %
1000 INTRAVENOUS SOLUTION INTRAVENOUS ONCE
Status: COMPLETED | OUTPATIENT
Start: 2025-05-22 | End: 2025-05-22

## 2025-05-22 RX ORDER — QUETIAPINE FUMARATE 200 MG/1
200 TABLET, FILM COATED ORAL 3 TIMES DAILY
Status: DISCONTINUED | OUTPATIENT
Start: 2025-05-22 | End: 2025-05-24 | Stop reason: HOSPADM

## 2025-05-22 RX ORDER — CIPROFLOXACIN 500 MG/1
500 TABLET, FILM COATED ORAL ONCE
Status: COMPLETED | OUTPATIENT
Start: 2025-05-22 | End: 2025-05-22

## 2025-05-22 RX ORDER — SODIUM CHLORIDE 9 MG/ML
INJECTION, SOLUTION INTRAVENOUS CONTINUOUS
Status: DISCONTINUED | OUTPATIENT
Start: 2025-05-22 | End: 2025-05-24

## 2025-05-22 RX ORDER — CARBIDOPA AND LEVODOPA 25; 100 MG/1; MG/1
1 TABLET ORAL 3 TIMES DAILY
Status: DISCONTINUED | OUTPATIENT
Start: 2025-05-22 | End: 2025-05-24 | Stop reason: HOSPADM

## 2025-05-22 RX ORDER — SODIUM CHLORIDE 9 MG/ML
INJECTION, SOLUTION INTRAVENOUS PRN
Status: DISCONTINUED | OUTPATIENT
Start: 2025-05-22 | End: 2025-05-24 | Stop reason: HOSPADM

## 2025-05-22 RX ORDER — ACETAMINOPHEN 650 MG/1
650 SUPPOSITORY RECTAL EVERY 6 HOURS PRN
Status: DISCONTINUED | OUTPATIENT
Start: 2025-05-22 | End: 2025-05-24 | Stop reason: HOSPADM

## 2025-05-22 RX ORDER — QUETIAPINE FUMARATE 200 MG/1
400 TABLET, FILM COATED ORAL NIGHTLY
Status: DISCONTINUED | OUTPATIENT
Start: 2025-05-22 | End: 2025-05-22

## 2025-05-22 RX ORDER — ONDANSETRON 2 MG/ML
4 INJECTION INTRAMUSCULAR; INTRAVENOUS EVERY 6 HOURS PRN
Status: DISCONTINUED | OUTPATIENT
Start: 2025-05-22 | End: 2025-05-24 | Stop reason: HOSPADM

## 2025-05-22 RX ORDER — RISPERIDONE 1 MG/1
3 TABLET ORAL DAILY
Status: DISCONTINUED | OUTPATIENT
Start: 2025-05-22 | End: 2025-05-22

## 2025-05-22 RX ADMIN — CARBIDOPA AND LEVODOPA 1 TABLET: 25; 100 TABLET ORAL at 20:06

## 2025-05-22 RX ADMIN — BUDESONIDE AND FORMOTEROL FUMARATE DIHYDRATE 2 PUFF: 160; 4.5 AEROSOL RESPIRATORY (INHALATION) at 20:48

## 2025-05-22 RX ADMIN — CARBIDOPA AND LEVODOPA 1 TABLET: 25; 100 TABLET ORAL at 17:07

## 2025-05-22 RX ADMIN — SODIUM CHLORIDE: 0.9 INJECTION, SOLUTION INTRAVENOUS at 16:49

## 2025-05-22 RX ADMIN — POTASSIUM BICARBONATE 40 MEQ: 782 TABLET, EFFERVESCENT ORAL at 18:29

## 2025-05-22 RX ADMIN — CIPROFLOXACIN HYDROCHLORIDE 500 MG: 500 TABLET, FILM COATED ORAL at 14:18

## 2025-05-22 RX ADMIN — ENOXAPARIN SODIUM 40 MG: 100 INJECTION SUBCUTANEOUS at 17:07

## 2025-05-22 RX ADMIN — METOPROLOL TARTRATE 25 MG: 25 TABLET, FILM COATED ORAL at 20:06

## 2025-05-22 RX ADMIN — QUETIAPINE FUMARATE 200 MG: 200 TABLET ORAL at 20:06

## 2025-05-22 RX ADMIN — TRAZODONE HYDROCHLORIDE 50 MG: 50 TABLET ORAL at 20:06

## 2025-05-22 RX ADMIN — LITHIUM CARBONATE 300 MG: 300 TABLET ORAL at 21:34

## 2025-05-22 RX ADMIN — LEVOTHYROXINE SODIUM 200 MCG: 0.2 TABLET ORAL at 15:06

## 2025-05-22 RX ADMIN — PRAZOSIN HYDROCHLORIDE 1 MG: 1 CAPSULE ORAL at 20:06

## 2025-05-22 RX ADMIN — SODIUM CHLORIDE 1000 ML: 0.9 INJECTION, SOLUTION INTRAVENOUS at 14:20

## 2025-05-22 ASSESSMENT — ENCOUNTER SYMPTOMS
EYES NEGATIVE: 1
CHEST TIGHTNESS: 0
ABDOMINAL DISTENTION: 0
FACIAL SWELLING: 0
EYE DISCHARGE: 0
ABDOMINAL PAIN: 0
BACK PAIN: 0
RESPIRATORY NEGATIVE: 1
EYE PAIN: 0
SHORTNESS OF BREATH: 0
GASTROINTESTINAL NEGATIVE: 1

## 2025-05-22 ASSESSMENT — PAIN - FUNCTIONAL ASSESSMENT
PAIN_FUNCTIONAL_ASSESSMENT: 0-10
PAIN_FUNCTIONAL_ASSESSMENT: ACTIVITIES ARE NOT PREVENTED
PAIN_FUNCTIONAL_ASSESSMENT: 0-10

## 2025-05-22 ASSESSMENT — PAIN DESCRIPTION - LOCATION: LOCATION: BACK

## 2025-05-22 ASSESSMENT — PAIN DESCRIPTION - PAIN TYPE: TYPE: ACUTE PAIN

## 2025-05-22 ASSESSMENT — PAIN SCALES - GENERAL
PAINLEVEL_OUTOF10: 7
PAINLEVEL_OUTOF10: 6

## 2025-05-22 ASSESSMENT — PAIN DESCRIPTION - DESCRIPTORS: DESCRIPTORS: ACHING

## 2025-05-22 NOTE — PROGRESS NOTES
[x] The Home Med List was verified for accuracy and marked COMPLETED. The following sources were used to assist with Medication Reconciliation:    [x] Med Rec Pharmacy tech has already completed home med list in the ED and note reviewed   [] Patient med list was transcribed into the EHR and verified for accuracy.(Note method of verification)  [] Patient provided bottles of their medications for validation  [] Medications reviewed and confirmed with  (Please list name and relationship to patient)  [] Contacted patient's pharmacy to confirm home medications (Please list the pharmacy)  [] Home medications reviewed using Dispense Report   [] Contacted physician office to confirm home medications  [] Medical Records received from another facility (list facility and place copy placed in chart)  []   was notified regarding changes to home med list via  on 5/22/2025 at the following time:        [] There are one or more home medications that need clarification before Medication Reconciliation can be marked completed. The Med List Status has been marked as In Progress.   To assist with Home Medication Reconciliation the following actions have been taken:    [] Family requested to bring medications in their original bottles to the hospital for verification  [] Family requested to call hospital with list of medications  [] Call to physicians off and left message (list physician and who they spoke to, date and time)  [] Request for medical records made to   [] MAR from facility requested to be faxed over  [] Unable to complete due to patient condition  [] Oncoming nurse  notified of incomplete med list for follow up  [] Other       *Effective communication is essential throughout this process. It is important for the nurse to document the progress of the med rec to keep team members informed. If any changes are made to the home medication list, the nurse is responsible for notifying the physician of the updated list and

## 2025-05-22 NOTE — H&P
[facility]  Office: [phone]  Santo Setele DO, James Serrano DO, Hayder Yadav DO, Mumtaz Lay DO, Georgi Chung MD, Emperatriz Simon MD, Ryan Lawrence MD, Stacey Marquez MD,  Amauri Anders MD, Claudia Cook MD, Loreto Nunez MD,  Radha Moore DO, Janice Wagner MD, Cristiano Chung MD, Bennett Steele DO, Shakira Meredith MD,  Jerry Baxter DO, Di Pizarro MD, Didi Hernández MD, Beverley Soto MD,  Kunal Devlin MD, Zach Brizuela MD, Vladislav Madrid MD, Namrata Han MD, Tarik Bonilla MD, Nereida Murry MD, Breezy Natarajan DO, Desirae Virk MD, Edin Sadler MD, Radha Workman MD, Mohsin Reza, MD, Thong Flower MD, Shirley Waterhouse, CNP,  Vivi Portillo, CNP, Breezy Rollins, CNP,  Evy Schneider, BILL, Adalgisa Correa, CNP, Alisha Goetz, CNP, Rosario Nunez, CNP, Tamika Friedman, CNP, Taya Funes, PA-C, Romelia Verma, CNP, Keira Hardwick, CNP,  Marion Alicia, CNP, Kalyn Casanova, CNP, Tam Minor, PA-C, Ana Kaur, CNP,  Chetna Hirsch, CNS, Dayana Coyle, CNP, Ibis Hinkle, CNP,   Vicky Carrillo, CNP         [facility]   IN-PATIENT SERVICE   [facility]    HISTORY AND PHYSICAL EXAMINATION            Date:   5/22/2025  Patient name:  Elaine Polanco  Date of admission:  5/22/2025 11:56 AM  MRN:   [id number]  Account:  [id number]  YOB: 1970  PCP:    Alayna Gupta DTR  Room:   2026/2026-01  Code Status:    Full Code    Chief Complaint:     Chief Complaint   Patient presents with    Altered Mental Status     Pt reports she has been on Lithium for years, ran out and was not taking it for 1.5 weeks. Got more and started taking it again and became disoriented and confused. Pt from [facility]     Back Pain       History Obtained From:     patient, electronic medical record    History of Present Illness:     Elaine Polanco is a 54 y.o. Non- / non  female who presents with Altered Mental Status (Pt reports she

## 2025-05-22 NOTE — PROGRESS NOTES
Pharmacy Medication Review    The patient's list of current home medications has been reviewed.     PHYSICIANS AND NURSE PRACTITIONERS: please note there is no Transitions of Care/Med Rec Pharmacist available to address any discrepancies on inpatient orders. It is the responsibility of the attending provider to review the updates made to the home med list and adjust inpatient orders as appropriate.        Source(s) of information:Care Everywhere, Surescripts refill report        Based on information provided by the above source(s), I have updated the patient's home med list as described below.     Removed   mirtazapine (REMERON) 30 MG tablet   risperiDONE (RISPERDAL) 3 MG tablet   acetaminophen (TYLENOL) 325 MG tablet   amLODIPine (NORVASC) 5 MG tablet   divalproex (DEPAKOTE ER) 250 MG extended release tablet   pantoprazole (PROTONIX) 40 MG tablet   QUEtiapine (SEROQUEL) 400 MG tablet      Added QUEtiapine (SEROQUEL) 200 MG tablet   ibuprofen (ADVIL;MOTRIN) 800 MG tablet      Adjusted   gabapentin (NEURONTIN) 100 MG capsule  TRELEGY ELLIPTA 200-62.5-25 MCG/ACT AEPB inhaler   hydrOXYzine HCl (ATARAX) 50 MG tablet   lithium 300 MG capsule      Other notes:   None    Are any of the medications noted above considered a 'high alert' medication? YES      Is the patient on warfarin at home? No          Please feel free to call me with any questions about this encounter. Thank you.      Ashutosh Henning, pharmacy technician  [facility]  Phone:  [phone]      Electronically signed by Ashutosh Henning on 5/22/2025 at 4:44 PM   Note: co-signature by the pharmacist only acknowledges that I have performed a medication review and does not attest to an evaluation of this medication review.      Prior to Admission medications    Medication Sig   QUEtiapine (SEROQUEL) 200 MG tablet Take 1 tablet by mouth 3 times daily   ibuprofen (ADVIL;MOTRIN) 800 MG tablet Take 1 tablet by mouth every 8 hours as needed for Pain    albuterol sulfate HFA (PROVENTIL;VENTOLIN;PROAIR) 108 (90 Base) MCG/ACT inhaler Inhale 2 puffs into the lungs every 6 hours as needed   TRELEGY ELLIPTA 200-62.5-25 MCG/ACT AEPB inhaler Inhale 1 puff into the lungs daily   hydrOXYzine HCl (ATARAX) 50 MG tablet Take 1 tablet by mouth every 8 hours as needed   lithium 300 MG capsule Take 1 capsule by mouth 3 times daily (with meals)     metoprolol tartrate (LOPRESSOR) 25 MG tablet Take 1 tablet by mouth 2 times daily   prazosin (MINIPRESS) 1 MG capsule Take 1 capsule by mouth nightly   traZODone (DESYREL) 50 MG tablet Take 1 tablet by mouth nightly   carbidopa-levodopa (SINEMET)  MG per tablet Take 1 tablet by mouth 3 times daily   gabapentin (NEURONTIN) 100 MG capsule Take 1 capsule by mouth 3 times daily.

## 2025-05-22 NOTE — PROGRESS NOTES
Pt admitted to room 2026 at this time. Vital signs obtained, telemetry initiated. Admission database completed, home med list reviewed. Bed alarm on for pt safety, call light within reach.

## 2025-05-22 NOTE — ED PROVIDER NOTES
EMERGENCY DEPARTMENT ENCOUNTER    Pt Name: Elaine Polanco  MRN: [id number]  Birthdate 1970  Date of evaluation: 5/22/25  CHIEF COMPLAINT       Chief Complaint   Patient presents with    Altered Mental Status     Pt reports she has been on Lithium for years, ran out and was not taking it for 1.5 weeks. Got more and started taking it again and became disoriented and confused. Pt from [facility]     Back Pain     HISTORY OF PRESENT ILLNESS   HPI   The patient is a 49-year-old female with history of diabetes bipolar disorder who presented to the emergency department secondary to confusion.  For the last several weeks patient has episodes of confusion.  She thinks is related to being off her her lithium.  She has been on lithium for over 20 years she ran out did not have it for about a week and a half.  She has been taking it for 6 weeks been taking as prescribed has not missed any dosages.  Patient has had tremors for several months she had an appointment with a PCP but missed her appointment.  Per staff she appeared confused and did not know the season.  Patient is alert and oriented times birthdate, place and circumstance.  Patient denies any previous history of Parkinson disease [place] or any neurological disorder.  She is adopted does not know her family history.  She denies seizure-like activity.  Patient denied chest pain, shortness of breath, nausea, vomiting, fevers or chills      REVIEW OF SYSTEMS     Review of Systems   Constitutional:  Negative for chills, diaphoresis and fever.   HENT:  Negative for congestion, ear pain and facial swelling.    Eyes:  Negative for pain, discharge and visual disturbance.   Respiratory:  Negative for chest tightness and shortness of breath.    Cardiovascular:  Negative for chest pain and palpitations.   Gastrointestinal:  Negative for abdominal distention and abdominal pain.   Genitourinary:  Negative for difficulty urinating and flank pain.

## 2025-05-23 ENCOUNTER — APPOINTMENT (OUTPATIENT)
Dept: MRI IMAGING | Age: 55
End: 2025-05-23
Payer: MEDICAID

## 2025-05-23 ENCOUNTER — APPOINTMENT (OUTPATIENT)
Dept: ULTRASOUND IMAGING | Age: 55
End: 2025-05-23
Payer: MEDICAID

## 2025-05-23 PROBLEM — F31.9 BIPOLAR 1 DISORDER (HCC): Status: ACTIVE | Noted: 2025-05-23

## 2025-05-23 LAB
ALBUMIN SERPL-MCNC: 3.5 G/DL (ref 3.5–5.2)
ALBUMIN/GLOB SERPL: 1.7 {RATIO} (ref 1–2.5)
ALP SERPL-CCNC: 112 U/L (ref 35–104)
ALT SERPL-CCNC: 6 U/L (ref 10–35)
ANION GAP SERPL CALCULATED.3IONS-SCNC: 7 MMOL/L (ref 9–16)
AST SERPL-CCNC: 20 U/L (ref 10–35)
BASOPHILS # BLD: 0.06 K/UL (ref 0–0.2)
BASOPHILS NFR BLD: 1 % (ref 0–2)
BILIRUB SERPL-MCNC: 0.3 MG/DL (ref 0–1.2)
BUN SERPL-MCNC: 13 MG/DL (ref 6–20)
CALCIUM SERPL-MCNC: 9.3 MG/DL (ref 8.6–10.4)
CHLORIDE SERPL-SCNC: 109 MMOL/L (ref 98–107)
CO2 SERPL-SCNC: 28 MMOL/L (ref 20–31)
CREAT SERPL-MCNC: 1.2 MG/DL (ref 0.5–0.9)
EKG ATRIAL RATE: 74 BPM
EKG P AXIS: 70 DEGREES
EKG P-R INTERVAL: 164 MS
EKG Q-T INTERVAL: 428 MS
EKG QRS DURATION: 96 MS
EKG QTC CALCULATION (BAZETT): 475 MS
EKG R AXIS: 75 DEGREES
EKG T AXIS: -35 DEGREES
EKG VENTRICULAR RATE: 74 BPM
EOSINOPHIL # BLD: 0.39 K/UL (ref 0–0.44)
EOSINOPHILS RELATIVE PERCENT: 7 % (ref 1–4)
ERYTHROCYTE [DISTWIDTH] IN BLOOD BY AUTOMATED COUNT: 12.7 % (ref 11.8–14.4)
GFR, ESTIMATED: 52 ML/MIN/1.73M2
GLUCOSE SERPL-MCNC: 92 MG/DL (ref 74–99)
HCT VFR BLD AUTO: 32.2 % (ref 36.3–47.1)
HGB BLD-MCNC: 11.2 G/DL (ref 11.9–15.1)
IMM GRANULOCYTES # BLD AUTO: 0.01 K/UL (ref 0–0.3)
IMM GRANULOCYTES NFR BLD: 0 %
INR PPP: 1
LYMPHOCYTES NFR BLD: 2.45 K/UL (ref 1.1–3.7)
LYMPHOCYTES RELATIVE PERCENT: 41 % (ref 24–43)
MAGNESIUM SERPL-MCNC: 2.2 MG/DL (ref 1.6–2.6)
MCH RBC QN AUTO: 33.9 PG (ref 25.2–33.5)
MCHC RBC AUTO-ENTMCNC: 34.8 G/DL (ref 28.4–34.8)
MCV RBC AUTO: 97.6 FL (ref 82.6–102.9)
MONOCYTES NFR BLD: 0.58 K/UL (ref 0.1–1.2)
MONOCYTES NFR BLD: 10 % (ref 3–12)
NEUTROPHILS NFR BLD: 41 % (ref 36–65)
NEUTS SEG NFR BLD: 2.4 K/UL (ref 1.5–8.1)
NRBC BLD-RTO: 0 PER 100 WBC
PLATELET # BLD AUTO: 312 K/UL (ref 138–453)
PMV BLD AUTO: 9.8 FL (ref 8.1–13.5)
POTASSIUM SERPL-SCNC: 4.5 MMOL/L (ref 3.7–5.3)
PROT SERPL-MCNC: 5.6 G/DL (ref 6.6–8.7)
PROTHROMBIN TIME: 13.9 SEC (ref 11.5–14.2)
RBC # BLD AUTO: 3.3 M/UL (ref 3.95–5.11)
SODIUM SERPL-SCNC: 144 MMOL/L (ref 136–145)
WBC OTHER # BLD: 5.9 K/UL (ref 3.5–11.3)

## 2025-05-23 PROCEDURE — 72141 MRI NECK SPINE W/O DYE: CPT

## 2025-05-23 PROCEDURE — 36415 COLL VENOUS BLD VENIPUNCTURE: CPT

## 2025-05-23 PROCEDURE — 83735 ASSAY OF MAGNESIUM: CPT

## 2025-05-23 PROCEDURE — 80053 COMPREHEN METABOLIC PANEL: CPT

## 2025-05-23 PROCEDURE — 70551 MRI BRAIN STEM W/O DYE: CPT

## 2025-05-23 PROCEDURE — 1200000000 HC SEMI PRIVATE

## 2025-05-23 PROCEDURE — 94761 N-INVAS EAR/PLS OXIMETRY MLT: CPT

## 2025-05-23 PROCEDURE — 97161 PT EVAL LOW COMPLEX 20 MIN: CPT

## 2025-05-23 PROCEDURE — 94640 AIRWAY INHALATION TREATMENT: CPT

## 2025-05-23 PROCEDURE — 76770 US EXAM ABDO BACK WALL COMP: CPT

## 2025-05-23 PROCEDURE — 2580000003 HC RX 258: Performed by: NURSE PRACTITIONER

## 2025-05-23 PROCEDURE — 97166 OT EVAL MOD COMPLEX 45 MIN: CPT

## 2025-05-23 PROCEDURE — 6370000000 HC RX 637 (ALT 250 FOR IP): Performed by: NURSE PRACTITIONER

## 2025-05-23 PROCEDURE — 85025 COMPLETE CBC W/AUTO DIFF WBC: CPT

## 2025-05-23 PROCEDURE — 85610 PROTHROMBIN TIME: CPT

## 2025-05-23 PROCEDURE — 97162 PT EVAL MOD COMPLEX 30 MIN: CPT

## 2025-05-23 PROCEDURE — 99221 1ST HOSP IP/OBS SF/LOW 40: CPT

## 2025-05-23 PROCEDURE — 93010 ELECTROCARDIOGRAM REPORT: CPT | Performed by: INTERNAL MEDICINE

## 2025-05-23 PROCEDURE — 99232 SBSQ HOSP IP/OBS MODERATE 35: CPT | Performed by: NURSE PRACTITIONER

## 2025-05-23 RX ORDER — LITHIUM CARBONATE 300 MG
300 TABLET ORAL EVERY 12 HOURS SCHEDULED
Status: DISCONTINUED | OUTPATIENT
Start: 2025-05-24 | End: 2025-05-24 | Stop reason: HOSPADM

## 2025-05-23 RX ADMIN — METOPROLOL TARTRATE 25 MG: 25 TABLET, FILM COATED ORAL at 20:56

## 2025-05-23 RX ADMIN — BUDESONIDE AND FORMOTEROL FUMARATE DIHYDRATE 2 PUFF: 160; 4.5 AEROSOL RESPIRATORY (INHALATION) at 19:50

## 2025-05-23 RX ADMIN — QUETIAPINE FUMARATE 200 MG: 200 TABLET ORAL at 08:08

## 2025-05-23 RX ADMIN — CARBIDOPA AND LEVODOPA 1 TABLET: 25; 100 TABLET ORAL at 08:09

## 2025-05-23 RX ADMIN — PRAZOSIN HYDROCHLORIDE 1 MG: 1 CAPSULE ORAL at 20:56

## 2025-05-23 RX ADMIN — LITHIUM CARBONATE 300 MG: 300 TABLET ORAL at 12:01

## 2025-05-23 RX ADMIN — CARBIDOPA AND LEVODOPA 1 TABLET: 25; 100 TABLET ORAL at 16:26

## 2025-05-23 RX ADMIN — METOPROLOL TARTRATE 25 MG: 25 TABLET, FILM COATED ORAL at 08:09

## 2025-05-23 RX ADMIN — SODIUM CHLORIDE: 0.9 INJECTION, SOLUTION INTRAVENOUS at 04:00

## 2025-05-23 RX ADMIN — BUDESONIDE AND FORMOTEROL FUMARATE DIHYDRATE 2 PUFF: 160; 4.5 AEROSOL RESPIRATORY (INHALATION) at 08:09

## 2025-05-23 RX ADMIN — QUETIAPINE FUMARATE 200 MG: 200 TABLET ORAL at 16:26

## 2025-05-23 RX ADMIN — LITHIUM CARBONATE 300 MG: 300 TABLET ORAL at 08:08

## 2025-05-23 RX ADMIN — TRAZODONE HYDROCHLORIDE 50 MG: 50 TABLET ORAL at 20:56

## 2025-05-23 RX ADMIN — TIOTROPIUM BROMIDE INHALATION SPRAY 2 PUFF: 3.12 SPRAY, METERED RESPIRATORY (INHALATION) at 08:09

## 2025-05-23 RX ADMIN — CARBIDOPA AND LEVODOPA 1 TABLET: 25; 100 TABLET ORAL at 20:56

## 2025-05-23 RX ADMIN — QUETIAPINE FUMARATE 200 MG: 200 TABLET ORAL at 20:56

## 2025-05-23 ASSESSMENT — ENCOUNTER SYMPTOMS
GASTROINTESTINAL NEGATIVE: 1
RESPIRATORY NEGATIVE: 1
EYES NEGATIVE: 1

## 2025-05-23 NOTE — PROGRESS NOTES
Occupational Therapy  Occupational Therapy Initial Evaluation  Facility/Department: [facility] MED SURG   Patient Name: Elaine Polanco        MRN: [id number]    : 1970    Date of Service: 2025           Chief Complaint   Patient presents with    Altered Mental Status     Pt reports she has been on Lithium for years, ran out and was not taking it for 1.5 weeks. Got more and started taking it again and became disoriented and confused. Pt from [facility]     Back Pain     Past Medical History:  has a past medical history of Bipolar disorder (HCC), Diabetes mellitus (HCC), Psychiatric problem, PTSD (post-traumatic stress disorder), and Thyroid disease.  Past Surgical History:  has a past surgical history that includes Upper gastrointestinal endoscopy (N/A, 2022).    Assessment  Performance deficits / Impairments: Decreased safe awareness;Decreased coordination  Assessment: Pt appears to be at baseline in terms of ADL performance and functional mob. Pt seen for eval only. Pt is not receptive to education and mental health/psyciatric issues appear to be pt's main deficit. No further OT treatment warranted this admission unless requested otherwise by pt/staff.  Decision Making: Medium Complexity  REQUIRES OT FOLLOW-UP: No  Activity Tolerance  Activity Tolerance: Patient Tolerated treatment well  Safety Devices  Type of Devices: Left in bed;Nurse notified;Call light within reach    Restrictions/Precautions  Restrictions/Precautions  Restrictions/Precautions: General Precautions  Activity Level: Up with Assist       Subjective  General  Chart Reviewed: Yes  Patient assessed for rehabilitation services?: Yes  Additional Pertinent Hx: Bipolar  Family / Caregiver Present: No  Subjective  Subjective: pt denies needs; states she is independent. Does admit to tremors in hands/UEs and reports that she spills things frequently.          Home Setup/Prior Level of Function  Social/Functional History  Lives With:  needed for taking care of personal grooming?: None  How much help for eating meals?: None  AM-PAC Inpatient Daily Activity Raw Score: 20  AM-PAC Inpatient ADL T-Scale Score : 42.03  ADL Inpatient CMS 0-100% Score: 38.32  ADL Inpatient CMS G-Code Modifier : CJ    Minutes  OT Individual Minutes  Time In: 1021  Time Out: 1031 (+5 min chart review)  Minutes: 15    Electronically signed by Sendy Ray OT on 5/23/25 at 11:43 AM EDT

## 2025-05-23 NOTE — PLAN OF CARE
Problem: Respiratory - Adult  Goal: Achieves optimal ventilation and oxygenation  5/23/2025 1952 by Kp Grant RCP  Outcome: Progressing

## 2025-05-23 NOTE — PLAN OF CARE
Problem: Chronic Conditions and Co-morbidities  Goal: Patient's chronic conditions and co-morbidity symptoms are monitored and maintained or improved  5/22/2025 2014 by Sienna Loza RN  Outcome: Progressing  Flowsheets (Taken 5/22/2025 2014)  Care Plan - Patient's Chronic Conditions and Co-Morbidity Symptoms are Monitored and Maintained or Improved:   Monitor and assess patient's chronic conditions and comorbid symptoms for stability, deterioration, or improvement   Collaborate with multidisciplinary team to address chronic and comorbid conditions and prevent exacerbation or deterioration   Update acute care plan with appropriate goals if chronic or comorbid symptoms are exacerbated and prevent overall improvement and discharge     Problem: Discharge Planning  Goal: Discharge to home or other facility with appropriate resources  5/22/2025 2014 by Sienna Loza RN  Outcome: Progressing  Flowsheets (Taken 5/22/2025 2014)  Discharge to home or other facility with appropriate resources:   Identify barriers to discharge with patient and caregiver   Arrange for needed discharge resources and transportation as appropriate   Identify discharge learning needs (meds, wound care, etc)     Problem: Pain  Goal: Verbalizes/displays adequate comfort level or baseline comfort level  5/22/2025 2014 by Sienna Loza RN  Outcome: Progressing  Flowsheets (Taken 5/22/2025 2014)  Verbalizes/displays adequate comfort level or baseline comfort level:   Encourage patient to monitor pain and request assistance   Assess pain using appropriate pain scale   Administer analgesics based on type and severity of pain and evaluate response   Implement non-pharmacological measures as appropriate and evaluate response     Problem: Safety - Adult  Goal: Free from fall injury  5/22/2025 2014 by Sienna Loza RN  Outcome: Progressing  Flowsheets (Taken 5/22/2025 2014)  Free From Fall Injury: Instruct family/caregiver on patient

## 2025-05-23 NOTE — PROGRESS NOTES
Physical Therapy  Facility/Department: [facility] MED SURG   Physical Therapy Initial Evaluation    Patient Name: Elaine Polanco        MRN: [id number]    : 1970    Date of Service: 2025    Chief Complaint   Patient presents with    Altered Mental Status     Pt reports she has been on Lithium for years, ran out and was not taking it for 1.5 weeks. Got more and started taking it again and became disoriented and confused. Pt from [facility]     Back Pain     Past Medical History:  has a past medical history of Bipolar disorder (HCC), Diabetes mellitus (HCC), Psychiatric problem, PTSD (post-traumatic stress disorder), and Thyroid disease.  Past Surgical History:  has a past surgical history that includes Upper gastrointestinal endoscopy (N/A, 2022).    Discharge Recommendations  Discharge Recommendations: Home with assist PRN       Assessment  Body Structures, Functions, Activity Limitations Requiring Skilled Therapeutic Intervention: Decreased safe awareness  Assessment: Patient transferring and ambulating indep in room and appears to be close to her baseline function. D/C from PT.  Decision Making: Medium Complexity  Requires PT Follow-Up: No  Activity Tolerance  Activity Tolerance: Patient tolerated treatment well  Safety Devices  Type of Devices: Left in bed, Nurse notified, Call light within reach    AM-PAC  AM-PAC Basic Mobility - Inpatient   How much help is needed turning from your back to your side while in a flat bed without using bedrails?: None  How much help is needed moving from lying on your back to sitting on the side of a flat bed without using bedrails?: None  How much help is needed moving to and from a bed to a chair?: None  How much help is needed standing up from a chair using your arms?: None  How much help is needed walking in hospital room?: None  How much help is needed climbing 3-5 steps with a railing?: None  AM-PAC Inpatient Mobility Raw Score : 24  AM-PAC Inpatient  errors  Insights: Decreased awareness of deficits    Observation/Palpation  Posture: Good  Observation: Noobservable tremors during eval.    AROM RLE (degrees)  RLE AROM: WFL  AROM LLE (degrees)  LLE AROM : WFL  AROM RUE (degrees)  RUE General AROM: see OT assessment  AROM LUE (degrees)  LUE General AROM: See OT Assessment          Strength LLE  Strength LLE: WFL  Strength RUE  Comment: see OT assessment  Strength LUE  Comment: see OT assessment    Mobility   Bed mobility  Rolling to Left: Independent  Rolling to Right: Independent  Supine to Sit: Independent  Sit to Supine: Independent  Scooting: Independent         Transfers  Sit to Stand: Independent  Stand to Sit: Independent  Bed to Chair: Independent  Stand Pivot Transfers: Independent    Ambulation  Surface: Level tile  Device: No Device  Assistance: Independent  Quality of Gait: Steady gait, safety could be impacted by impulsivity, appears close to reported baseline  Distance: 30 feet (patient able to go further, but not receptive)      Balance   Balance  Sitting - Static: Good  Sitting - Dynamic: Good  Standing - Static: Good  Standing - Dynamic: Good, +      Patient Education  Patient Education  Education Given To: Patient  Education Provided: Role of Therapy  Education Provided Comments: Patient educated on purpose of PT eval, will take patient of PT caseload as she is at her baseline and she does not want therapy at this time.  Education Method: Verbal  Barriers to Learning: Readiness to Learn  Education Outcome: Verbalized understanding    Plan  Physical Therapy Plan  General Plan: Discharge with evaluation only    Goals  Patient Goals   Patient Goals : Return home  Short Term Goals  Time Frame for Short Term Goals: Eval only, no goals    Minutes  PT Individual Minutes  Time In: 1012  Time Out: 1031  Minutes: 19    Electronically signed by Sahra Antunez PT on 5/23/25 at 11:52 AM EDT

## 2025-05-23 NOTE — PLAN OF CARE
Message sent to neurosurgery REGINA at [facility] to request review of MRI imaging and for possible further recommendations (inpatient eval, etc.) from neurosurgeon.     Awaiting message back.

## 2025-05-23 NOTE — PLAN OF CARE
Problem: Respiratory - Adult  Goal: Achieves optimal ventilation and oxygenation  5/23/2025 0845 by Josué Sarabia RCP  Outcome: Progressing  Flowsheets (Taken 5/23/2025 0807 by Josie Rivera RN)  Achieves optimal ventilation and oxygenation:   Assess for changes in respiratory status   Assess for changes in mentation and behavior   Position to facilitate oxygenation and minimize respiratory effort   Encourage broncho-pulmonary hygiene including cough, deep breathe, incentive spirometry  5/22/2025 2053 by Hanna Cantrell, RCP  Outcome: Progressing

## 2025-05-23 NOTE — PLAN OF CARE
Pt mostly resting in bed today. She had retroperitoneal ultrasound which was unremarkable. She had MRI of brain and Cspine, but was very restless for the C spine part (per Migue). Results sent to physicians. She initially refused the psych consult, but after writer asked her again, was agreeable to talk with psych representative. Pt refused her third dose of lithium, stating she does not take that many doses at home.   Plan was to see if her change in mentation was related to her medication, or if it is of an organic cause.    Problem: Chronic Conditions and Co-morbidities  Goal: Patient's chronic conditions and co-morbidity symptoms are monitored and maintained or improved  Outcome: Progressing  Flowsheets (Taken 5/23/2025 0807)  Care Plan - Patient's Chronic Conditions and Co-Morbidity Symptoms are Monitored and Maintained or Improved:   Monitor and assess patient's chronic conditions and comorbid symptoms for stability, deterioration, or improvement   Collaborate with multidisciplinary team to address chronic and comorbid conditions and prevent exacerbation or deterioration   Update acute care plan with appropriate goals if chronic or comorbid symptoms are exacerbated and prevent overall improvement and discharge     Problem: Discharge Planning  Goal: Discharge to home or other facility with appropriate resources  Outcome: Progressing  Flowsheets (Taken 5/23/2025 0807)  Discharge to home or other facility with appropriate resources:   Identify barriers to discharge with patient and caregiver   Arrange for needed discharge resources and transportation as appropriate   Identify discharge learning needs (meds, wound care, etc)     Problem: Pain  Goal: Verbalizes/displays adequate comfort level or baseline comfort level  Outcome: Progressing     Problem: Safety - Adult  Goal: Free from fall injury  Outcome: Progressing  Flowsheets (Taken 5/23/2025 1244)  Free From Fall Injury: Instruct family/caregiver on patient

## 2025-05-23 NOTE — PROGRESS NOTES
Pt initially refused psych consult, but was agreeable to conversation after writer asked again if she'd talk to her.

## 2025-05-23 NOTE — CARE COORDINATION
expects to discharge to: Apartment  Plan for transportation at discharge: [facility]    Financial    Payor: [employer] MEDICAID / Plan: [facility] MEDICAID / Product Type: *No Product type* /     Does insurance require precert for SNF: Yes    Potential assistance Purchasing Medications:    Meds-to-Beds request: Yes      [facility] [id number] [facility] [place], [place] - [address] - P [phone] - F [phone]  [address]  [address]  [facility] [address]  Phone: [phone] Fax: [phone]      Notes:    Factors facilitating achievement of predicted outcomes: Cooperative and Pleasant    Barriers to discharge: Limited family support    Additional Case Management Notes:   Patient lives alone in an apt with no steps to enter and elevator to get to her floor. She just moved in was at [facility] last 19 months. She has been clean from etoh  for 1 year and crack for 24 months.     She has never used HC nor SNF in past. She is independent.     She has not see PCP in years. List given.     She has CM Zhanna thru [facility]. Follows with [facility] for her bipolar. Needs medication adjusted as she started lithium after being off it a while and had side effects.    She is declining any HC or dme needs. May need cab home.    Admitted with AGUSTIN and AMS ( lithium) and MRI ordered. Therapy pending.     If patient dc over weekend needs new pharmacy or printed rx as [facility] and [place] closed on the weekend.    Updated RN of the above.    The Plan for Transition of Care is related to the following treatment goals of AGUSTIN (acute kidney injury) [N17.9]    IF APPLICABLE: The Patient and/or patient representative Elaine and her family were provided with a choice of provider and agrees with the discharge plan. Freedom of choice list with basic dialogue that supports the patient's individualized plan of care/goals and shares the quality data associated with the providers was  provided to: Patient   Patient Representative Name:       The Patient and/or Patient Representative Agree with the Discharge Plan? Yes    BEVERLY BLAKELY RN  Case Management Department

## 2025-05-23 NOTE — PROGRESS NOTES
[facility]  Office: [phone]  Santo Steele DO, James Serrano DO, Hayder Yadav DO, Mumtaz Lay DO, Georgi Chung MD, Emperatriz Simon MD, Ryan Lawrence MD, Stacey Marquez MD,  Amauri Anders MD, Claudia Cook MD, Loreto Nunez MD,  Radha Moore DO, Janice Wagner MD, Cristiano Chung MD, Bennett Steele DO, Shakira Meredith MD,  Jerry Baxter DO, Di Pizarro MD, Didi Hernández MD, Beverley Soto MD,  Kunal Devlin MD, Zach Brizuela MD, Vladislav Madrid MD, Namrata Han MD, Tarik Bonilla MD, Nereida Murry MD, Breezy Natarajan DO, Desirae Virk MD, Edin Sadler MD, Radha Workman MD, Mohsin Reza, MD, Thong Flower MD, Shirley Waterhouse, CNP,  Vivi Portillo, CNP, Breezy Rollins, CNP,  Evy Schneider, DNP, Adalgisa Correa, CNP, Alisha Goetz, CNP, Rosario Nunez, CNP, Tamika Friedman, CNP, Taya Funes, PA-C, Romelia Verma, CNP, Keira Hardwick, CNP,  Marion Alicia, CNP, Kalyn Casanova, CNP, Tam Minor, PA-C, Ana Kaur, CNP,  Chetna Hirsch, CNS, Dayana Coyle, CNP, Ibis Hinkle, CNP,   Vicky Carrillo, CNP         [facility]   IN-PATIENT SERVICE   [facility]    Progress Note    5/23/2025    11:56 AM    Name:   Elaine Polanco  MRN:     [id number]     Acct:      [id number]   Room:   2026/2026-01  IP Day:  1  Admit Date:  5/22/2025 11:56 AM    PCP:   Alayna Gupta DTR  Code Status:  Full Code    Subjective:     C/C: did not sleep overnight  Chief Complaint   Patient presents with    Altered Mental Status     Pt reports she has been on Lithium for years, ran out and was not taking it for 1.5 weeks. Got more and started taking it again and became disoriented and confused. Pt from [facility]     Back Pain     Interval History Status: improved.     Patient resting in bed, eating breakfast. She complains of not sleeping overnight due to IV equipment alarms. She denies shortness of breath, chest pain, abdominal pain, nausea, vomiting. No  There is no guarding.      Hernia: No hernia is present.   Musculoskeletal:         General: Normal range of motion.      Cervical back: Normal range of motion and neck supple.      Right lower leg: No edema.      Left lower leg: No edema.   Skin:     General: Skin is warm.      Coloration: Skin is not jaundiced.      Findings: No bruising, erythema, lesion or rash.   Neurological:      General: No focal deficit present.      Mental Status: She is alert and oriented to person, place, and time.   Psychiatric:         Mood and Affect: Mood normal.         Behavior: Behavior normal.         Thought Content: Thought content normal.         Judgment: Judgment normal.         Assessment:        Hospital Problems           Last Modified POA    * (Principal) AGUSTIN (acute kidney injury) 5/22/2025 Yes    Cocaine abuse ([facility]) 5/22/2025 Yes    COPD (chronic obstructive pulmonary disease) ([facility]) 5/22/2025 Yes    Overview Signed 2/17/2025  5:09 PM by Romelia Verma APRN - NP   per cxr done 10/08/2015         Hypothyroidism 5/22/2025 Yes    Bipolar disorder ([facility]) 5/22/2025 Yes       Plan:        AGUSTIN: IV hydration as ordered. Labs reviewed. Labs as ordered. Monitor intake and output. Avoid hypotension. Avoid nephrotoxins  Cocaine/ alcohol abuse: Encourage continued abstinence from thesae substances  COPD: Monitor SpO2. Patient refusing nicotine patch at this time. Supp O2 as needed for SpO2 < 92%. Symbicort and Spiriva as ordered  Hypothyroidism: Labs reviewed. Discussed with Dr Marcela mercado thyroid supp at this time. Repeat TSH/ T4 in a few days once AGUSTIN resolved and patient stable  Bipolar disorder: Consult psych to review meds and adjust for side effect of tremors and any other neuro side effect.   MRI brain and cervical spine pending.   Continue Sinemet for tremors. Fall precautions. PT/ OT eval and treat    HENRIETTA Cunningham NP  5/23/2025  11:56 AM

## 2025-05-23 NOTE — CONSULTS
want to talk\". She now verbalizes willingness to engage meaningful discussion. She is irritable and minimally cooperative, refusing to discuss past psychiatric diagnosis, substance use, living arrangements, or link with [facility] mental [facility]. She is alert and oriented X 4. She reports recently being off Lithium for 5 days after 20 years of use. She is unwilling to discuss circumstances of being off this medication. She reports Lithium was restarted 2 weeks ago by \"my new doctor\". Per EMR, she ran out of her medications. She reports previous engagement with [facility] and refuses to discuss current provider for psychotropic medication management. She reports home regimen Lithium 200 mg at bedtime and Seroquel 200 mg at bedtime. She reports Lithium has been helpful in preventing manic episodes and depression. She is currently prescribed Lithium 300 mg three times daily and Seroquel 200 mg three times daily. She reports daytime tiredness and frequent naps. She denies sleeping through meals. She reports history of tremor of unknown etiology. It is likely she is experiencing tremor related to long-term use of Lithium and recent re-initiation of the medication. She denies GI disturbances, blurred vision, increased thirst/urination, or dizziness. She reports adequate fluid intake. She is minimally willing to cooperative with neuro exam. She does extend right hand briefly with fine tremor of hand observed. She reports history of dropping items from her hand. She is unwilling to discuss history of consult by neurology. She denies depression or anxiety. She denies suicidal or homicidal ideation. She denies recent ivis/hypomania. She denies paranoia or delusions. She does present as significantly suspicious stating \"I don't need to talk about my business with you\". No apparent evidence of delusions or preoccupation with internal stimuli. She has a previous admission to [facility] in 2022 when she was treated with Depakote instead of   fair    DSM-5 DIAGNOSIS:      Bipolar 1 disorder  AGUSTIN    Stressors     Severity of stressors is moderate  Source of stressors include:  Other psychosocial and environmental stressors    Plan:      Patient not meet criteria for inpatient psychiatric hospitalization.   Recommend consult with neurology.  Patient reports home regimen of Lithium 200 mg at bedtime and Seroquel 200 mg at bedtime. She is unwilling to make adjustments to her medications at this time.  No Medication Changes Today  Psychiatry will sign off at this time. Please re-consult for questions or concerns.       Thank you very much for allowing us to participate in the care of this patient.      Electronically signed by HENRIETTA Farooq CNP on 5/23/25 at 5:08 PM EDT    Please note that this chart was generated using voice recognition Dragon dictation software.  Although every effort was made to ensure the accuracy of this automated transcription, some errors in transcription may have occurred. E

## 2025-05-24 VITALS
WEIGHT: 148.9 LBS | HEART RATE: 65 BPM | RESPIRATION RATE: 16 BRPM | SYSTOLIC BLOOD PRESSURE: 126 MMHG | BODY MASS INDEX: 21.32 KG/M2 | OXYGEN SATURATION: 97 % | DIASTOLIC BLOOD PRESSURE: 90 MMHG | HEIGHT: 70 IN | TEMPERATURE: 97.7 F

## 2025-05-24 PROBLEM — N17.9 AKI (ACUTE KIDNEY INJURY): Status: RESOLVED | Noted: 2025-02-17 | Resolved: 2025-05-24

## 2025-05-24 PROBLEM — M48.02 CERVICAL STENOSIS OF SPINAL CANAL: Status: ACTIVE | Noted: 2025-05-24

## 2025-05-24 LAB
ANION GAP SERPL CALCULATED.3IONS-SCNC: 7 MMOL/L (ref 9–16)
BUN SERPL-MCNC: 11 MG/DL (ref 6–20)
CALCIUM SERPL-MCNC: 8.8 MG/DL (ref 8.6–10.4)
CHLORIDE SERPL-SCNC: 110 MMOL/L (ref 98–107)
CO2 SERPL-SCNC: 25 MMOL/L (ref 20–31)
CREAT SERPL-MCNC: 1 MG/DL (ref 0.5–0.9)
GFR, ESTIMATED: 69 ML/MIN/1.73M2
GLUCOSE SERPL-MCNC: 98 MG/DL (ref 74–99)
POTASSIUM SERPL-SCNC: 4.2 MMOL/L (ref 3.7–5.3)
SODIUM SERPL-SCNC: 142 MMOL/L (ref 136–145)

## 2025-05-24 PROCEDURE — 6370000000 HC RX 637 (ALT 250 FOR IP): Performed by: NURSE PRACTITIONER

## 2025-05-24 PROCEDURE — 36415 COLL VENOUS BLD VENIPUNCTURE: CPT

## 2025-05-24 PROCEDURE — 94640 AIRWAY INHALATION TREATMENT: CPT

## 2025-05-24 PROCEDURE — 2580000003 HC RX 258: Performed by: NURSE PRACTITIONER

## 2025-05-24 PROCEDURE — 80048 BASIC METABOLIC PNL TOTAL CA: CPT

## 2025-05-24 PROCEDURE — 94761 N-INVAS EAR/PLS OXIMETRY MLT: CPT

## 2025-05-24 PROCEDURE — 99239 HOSP IP/OBS DSCHRG MGMT >30: CPT | Performed by: NURSE PRACTITIONER

## 2025-05-24 RX ORDER — LITHIUM CARBONATE 300 MG
300 TABLET ORAL EVERY 12 HOURS SCHEDULED
Qty: 90 TABLET | Refills: 3 | Status: SHIPPED | OUTPATIENT
Start: 2025-05-24

## 2025-05-24 RX ADMIN — SODIUM CHLORIDE: 0.9 INJECTION, SOLUTION INTRAVENOUS at 10:25

## 2025-05-24 RX ADMIN — LITHIUM CARBONATE 300 MG: 300 TABLET ORAL at 10:08

## 2025-05-24 RX ADMIN — QUETIAPINE FUMARATE 200 MG: 200 TABLET ORAL at 14:11

## 2025-05-24 RX ADMIN — QUETIAPINE FUMARATE 200 MG: 200 TABLET ORAL at 10:08

## 2025-05-24 RX ADMIN — METOPROLOL TARTRATE 25 MG: 25 TABLET, FILM COATED ORAL at 10:09

## 2025-05-24 RX ADMIN — BUDESONIDE AND FORMOTEROL FUMARATE DIHYDRATE 2 PUFF: 160; 4.5 AEROSOL RESPIRATORY (INHALATION) at 07:43

## 2025-05-24 RX ADMIN — CARBIDOPA AND LEVODOPA 1 TABLET: 25; 100 TABLET ORAL at 14:11

## 2025-05-24 RX ADMIN — CARBIDOPA AND LEVODOPA 1 TABLET: 25; 100 TABLET ORAL at 10:08

## 2025-05-24 ASSESSMENT — ENCOUNTER SYMPTOMS
RESPIRATORY NEGATIVE: 1
GASTROINTESTINAL NEGATIVE: 1
EYES NEGATIVE: 1

## 2025-05-24 ASSESSMENT — PAIN SCALES - GENERAL: PAINLEVEL_OUTOF10: 0

## 2025-05-24 NOTE — DISCHARGE INSTRUCTIONS
Follow up with PCP in 1 week    Follow up with neurosurgery as soon as possible    Take medications as prescribed    Obtain MRI and liver US as soon as possible    Return to hospital if symptoms worsen or new symptoms appear.

## 2025-05-24 NOTE — PLAN OF CARE
Problem: Chronic Conditions and Co-morbidities  Goal: Patient's chronic conditions and co-morbidity symptoms are monitored and maintained or improved  5/24/2025 0301 by Mandy Green RN  Outcome: Progressing     Problem: Discharge Planning  Goal: Discharge to home or other facility with appropriate resources  5/24/2025 0301 by Mandy Green RN  Outcome: Progressing     Problem: Pain  Goal: Verbalizes/displays adequate comfort level or baseline comfort level  5/24/2025 0301 by Mandy Green RN  Outcome: Progressing     Problem: Safety - Adult  Goal: Free from fall injury  5/24/2025 0301 by Mandy Green RN  Outcome: Progressing     Problem: Neurosensory - Adult  Goal: Achieves stable or improved neurological status  5/24/2025 0301 by Mandy Green RN  Outcome: Progressing     Problem: Neurosensory - Adult  Goal: Achieves maximal functionality and self care  5/24/2025 0301 by Mandy Green RN  Outcome: Progressing     Problem: Musculoskeletal - Adult  Goal: Return ADL status to a safe level of function  5/24/2025 0301 by Mandy Green RN  Outcome: Progressing     Problem: Metabolic/Fluid and Electrolytes - Adult  Goal: Electrolytes maintained within normal limits  5/24/2025 0301 by Mandy Green RN  Outcome: Progressing     Problem: Coping  Goal: Pt/Family able to verbalize concerns and demonstrate effective coping strategies  Description: INTERVENTIONS:1. Assist patient/family to identify coping skills, available support systems and cultural and spiritual values2. Provide emotional support, including active listening and acknowledgement of concerns of patient and caregivers3. Reduce environmental stimuli, as able4. Instruct patient/family in relaxation techniques, as appropriate5. Assess for spiritual pain/suffering and initiate Spiritual Care, Psychosocial Clinical Specialist consults as needed  5/24/2025 0301 by Peter

## 2025-05-24 NOTE — PROGRESS NOTES
This writer went over AVS answered all questions at this time. Patient given orders for ultrasound, mri and neurosurgery referral . Patient verbalized understanding of all follow up appointments. Patient leaving with all personal belongings via wheelchair to private transportation. Patient stable at time of discharge.

## 2025-05-24 NOTE — PLAN OF CARE
Problem: Respiratory - Adult  Goal: Achieves optimal ventilation and oxygenation  5/24/2025 0745 by Jojo Woodward RCP  Outcome: Progressing  5/24/2025 0301 by Mandy Green RN  Outcome: Progressing  5/23/2025 1952 by Kp Grant RCP  Outcome: Progressing

## 2025-05-24 NOTE — PLAN OF CARE
Problem: Chronic Conditions and Co-morbidities  Goal: Patient's chronic conditions and co-morbidity symptoms are monitored and maintained or improved  5/24/2025 1533 by Kay Colby RN  Outcome: Adequate for Discharge  5/24/2025 0301 by Mandy Green RN  Outcome: Progressing     Problem: Discharge Planning  Goal: Discharge to home or other facility with appropriate resources  5/24/2025 1533 by Kay Colby RN  Outcome: Adequate for Discharge  5/24/2025 0301 by Mandy Green RN  Outcome: Progressing     Problem: Pain  Goal: Verbalizes/displays adequate comfort level or baseline comfort level  5/24/2025 1533 by Kay Colby RN  Outcome: Adequate for Discharge  Flowsheets (Taken 5/24/2025 1205 by Zhanna Polanco RN)  Verbalizes/displays adequate comfort level or baseline comfort level:   Assess pain using appropriate pain scale   Encourage patient to monitor pain and request assistance   Administer analgesics based on type and severity of pain and evaluate response  5/24/2025 0301 by Mandy Green RN  Outcome: Progressing     Problem: Safety - Adult  Goal: Free from fall injury  5/24/2025 1533 by Kay Colby RN  Outcome: Adequate for Discharge  5/24/2025 0301 by Mandy Green RN  Outcome: Progressing     Problem: Respiratory - Adult  Goal: Achieves optimal ventilation and oxygenation  5/24/2025 1533 by Kay Colby RN  Outcome: Adequate for Discharge  5/24/2025 0745 by Jojo Woodward RCP  Outcome: Progressing  5/24/2025 0301 by Mandy Green RN  Outcome: Progressing     Problem: Coping  Goal: Pt/Family able to verbalize concerns and demonstrate effective coping strategies  Description: INTERVENTIONS:1. Assist patient/family to identify coping skills, available support systems and cultural and spiritual values2. Provide emotional support, including active listening and acknowledgement of concerns of patient and caregivers3.

## 2025-05-24 NOTE — PROGRESS NOTES
result with patient and offered transfer to [facility] for formal neurosurgery evaluation. Patient prefers to be discharged home and follow up with neurosurgery as outpatient. She verbalized understanding of potential risk in waiting for neurosurgery evaluation.     Discussed with attending. Discussed with primary RN.     Brief History:     Elaine Polanco is a 54 y.o. Non- / non  female who presents with Altered Mental Status (Pt reports she has been on Lithium for years, ran out and was not taking it for 1.5 weeks. Got more and started taking it again and became disoriented and confused. Pt from [facility] ) and Back Pain   and is admitted to the hospital for the management of AGUSTIN (acute kidney injury).     Patient says that for the past couple of years she has suffered from \"muscle spasms\" in her hands to the point where she is dropping things and her overall coordination is poor at times to the point where she falls. She is currently in recovery for alcohol and cocaine abuse and denies current use of these substances. She is a smoker, but she is trying to quit. She says her psychiatrist sent her to the ED for evaluation of her poor coordination. Patient denies any past history of neck or shoulder injuries.  She has been on lithium for many years and had run out of that medication.     While in ED, she was found to have AGUSTIN and she was started on IV fluids. UA showed poss UTI and she was given Cipro. She was given synthroid as TSH and T4 showed mild hypothyroidism.      She was admitted for further management of AGUSTIN.      MRI of the brain and neck were completed to evaluate patient hand weakness and patient-reported gait disturbance. Discussed results with attending and transfer to [facility] for neurosurgery evaluation offered to patient. Patient opts to follow up as an outpatient.     Creatinine improved with IV hydration.     Plan for follow up with neurosurgery as outpatient.  °C) (Axillary)   Resp 16   Ht 1.778 m (5' 10\")   Wt 67.5 kg (148 lb 14.4 oz)   SpO2 96%   BMI 21.36 kg/m²   Temp (24hrs), Av.1 °F (36.7 °C), Min:97.7 °F (36.5 °C), Max:98.5 °F (36.9 °C)    No results for input(s): \"POCGLU\" in the last 72 hours.    I/O (24Hr):    Intake/Output Summary (Last 24 hours) at 2025 1412  Last data filed at 2025 0220  Gross per 24 hour   Intake 1234.59 ml   Output 1950 ml   Net -715.41 ml       Labs:  Hematology:  Recent Labs     25  0607   WBC 5.0 5.9   RBC 3.70* 3.30*   HGB 12.5 11.2*   HCT 35.5* 32.2*   MCV 95.9 97.6   MCH 33.8* 33.9*   MCHC 35.2* 34.8   RDW 12.5 12.7    312   MPV 9.3 9.8   INR  --  1.0     Chemistry:  Recent Labs     25  0607 25  0705    144 142   K 3.3* 4.5 4.2    109* 110*   CO2 27 28 25   GLUCOSE 110* 92 98   BUN 12 13 11   CREATININE 1.8* 1.2* 1.0*   MG 2.3 2.2  --    ANIONGAP 12 7* 7*   LABGLOM 33* 52* 69   CALCIUM 9.6 9.3 8.8   TROPHS 6  --   --    CKTOTAL 46  --   --      Recent Labs     25  1235 25  0607   TSH 4.70*  --    AST  --  20   ALT  --  6*   ALKPHOS  --  112*   BILITOT  --  0.3         Radiology:  MRI BRAIN WO CONTRAST  Result Date: 2025  1. There is an area of increased T2 signal noted in the central aspect of the cervical spinal cord, at the level of C2, which has some areas of T1 shortening. This may be related to hemorrhage versus mass with some surrounding areas of edema. This is less likely related to demyelinating disease. Recommend follow-up MRI of the cervical spine with IV contrast for further evaluation. 2. There are areas of suspected myelomalacia in the cervical spinal cord at the level of C5-C6. 3. Multilevel degenerative disc disease and facet arthropathy with areas of severe foraminal narrowing as described above. 4. Mild-moderate central spinal canal narrowing at C6-C7. 5. No acute intracranial abnormality. 6. Mild chronic microvascular

## 2025-05-24 NOTE — DISCHARGE SUMMARY
[facility]  Office: [phone]  Santo Steele DO, James Serrano DO, Hayder Yadav DO, Mumtaz Lay DO, Georgi Chung MD, Emperatriz Simon MD, Ryan Lawrence MD, Stacey Marquez MD,  Amauri Anders MD, Claudia Cook MD, Loreto Nunez MD,  Radha Moore DO, Janice Wagner MD, Cristinao Chung MD, Bennett Steele DO, Shakira Meredith MD,  Jerry Baxter DO, Di Pizarro MD, Didi Hernández MD, Beverley Soto MD,  Kunal Devlin MD, Zach Brizuela MD, Vladislav Madrid MD, Namrata Han MD, Tarik Bonilla MD, Nereida Murry MD, Breezy Natarajan DO, Desirae Virk MD, Edin Sadler MD, Radha Workman MD, Mohsin Reza, MD, Thong Flower MD, Shirley Waterhouse, CNP,  Vivi Portillo, CNP, Breezy Rollins, CNP,  Evy Schneider, BILL, Adalgisa Correa, MARGARET, Alisha Goetz, CNP, Rosario Nunez, CNP, Tamika Friedman, CNP, Taya Funes, PA-C, Romelia Verma, CNP, Keira Hardwick, CNP,  Marion Alicia, CNP, Kalyn Casanova, CNP, Tam Minor, PA-C, Ana Kuar, CNP,  Chetna Hirsch, CNS, Dayana Coyle, CNP, Ibis Hinkle, CNP,   Vicky Carrillo, CNP         [facility]   IN-PATIENT SERVICE   [facility]    Discharge Summary     Patient ID: Elaine Polanco  :  1970   MRN: [id number]     ACCOUNT:  [id number]   Patient's PCP: Alayna Gupta DTR  Admit Date: 2025   Discharge Date: 2025     Length of Stay: 2  Code Status:  Full Code  Admitting Physician: Hayder Yadav DO  Discharge Physician: HENRIETTA Cunningham - TRELL     Active Discharge Diagnoses:     Hospital Problem Lists:  Principal Problem (Resolved):    AGUSTIN (acute kidney injury)  Active Problems:    Cocaine abuse (HCC)    COPD (chronic obstructive pulmonary disease) (HCC)    Hypothyroidism    Bipolar disorder (HCC)    Bipolar 1 disorder (HCC)    Cervical stenosis of spinal canal      Admission Condition:  fair     Discharged Condition: good    Hospital Stay:     Hospital Course:  Elaine Polanco is a 54 y.o. female who

## (undated) DEVICE — DEFENDO AIR WATER SUCTION AND BIOPSY VALVE KIT FOR  OLYMPUS: Brand: DEFENDO AIR/WATER/SUCTION AND BIOPSY VALVE

## (undated) DEVICE — BITEBLOCK 54FR W/ DENT RIM BLOX

## (undated) DEVICE — ENDO KIT W/SYRINGE: Brand: MEDLINE INDUSTRIES, INC.